# Patient Record
Sex: FEMALE | Race: WHITE | NOT HISPANIC OR LATINO | Employment: FULL TIME
[De-identification: names, ages, dates, MRNs, and addresses within clinical notes are randomized per-mention and may not be internally consistent; named-entity substitution may affect disease eponyms.]

---

## 2017-05-06 ENCOUNTER — RECORDS - HEALTHEAST (OUTPATIENT)
Dept: ADMINISTRATIVE | Facility: OTHER | Age: 52
End: 2017-05-06

## 2017-05-27 ENCOUNTER — RECORDS - HEALTHEAST (OUTPATIENT)
Dept: ADMINISTRATIVE | Facility: OTHER | Age: 52
End: 2017-05-27

## 2017-10-24 ENCOUNTER — OFFICE VISIT - HEALTHEAST (OUTPATIENT)
Dept: FAMILY MEDICINE | Facility: CLINIC | Age: 52
End: 2017-10-24

## 2017-10-24 DIAGNOSIS — Z00.00 ROUTINE GENERAL MEDICAL EXAMINATION AT A HEALTH CARE FACILITY: ICD-10-CM

## 2017-10-24 DIAGNOSIS — E78.5 HYPERLIPIDEMIA, UNSPECIFIED HYPERLIPIDEMIA TYPE: ICD-10-CM

## 2017-10-24 DIAGNOSIS — E66.09 CLASS 1 OBESITY DUE TO EXCESS CALORIES WITH SERIOUS COMORBIDITY AND BODY MASS INDEX (BMI) OF 33.0 TO 33.9 IN ADULT: ICD-10-CM

## 2017-10-24 DIAGNOSIS — E11.9 TYPE 2 DIABETES MELLITUS (H): ICD-10-CM

## 2017-10-24 DIAGNOSIS — E66.811 CLASS 1 OBESITY DUE TO EXCESS CALORIES WITH SERIOUS COMORBIDITY AND BODY MASS INDEX (BMI) OF 33.0 TO 33.9 IN ADULT: ICD-10-CM

## 2017-10-24 DIAGNOSIS — J01.00 ACUTE NON-RECURRENT MAXILLARY SINUSITIS: ICD-10-CM

## 2017-10-24 DIAGNOSIS — R60.9 EDEMA, UNSPECIFIED TYPE: ICD-10-CM

## 2017-10-24 DIAGNOSIS — Z12.11 COLON CANCER SCREENING: ICD-10-CM

## 2017-10-24 LAB — HBA1C MFR BLD: 6.5 % (ref 3.5–6)

## 2017-10-24 RX ORDER — CHLORAL HYDRATE 500 MG
2 CAPSULE ORAL DAILY
Status: SHIPPED | COMMUNITY
Start: 2017-10-24 | End: 2023-12-06

## 2017-10-24 ASSESSMENT — MIFFLIN-ST. JEOR: SCORE: 1418.95

## 2017-10-27 LAB
HPV INTERPRETATION - HISTORICAL: NORMAL
HPV INTERPRETER - HISTORICAL: NORMAL

## 2017-11-02 ENCOUNTER — COMMUNICATION - HEALTHEAST (OUTPATIENT)
Dept: FAMILY MEDICINE | Facility: CLINIC | Age: 52
End: 2017-11-02

## 2017-11-09 ENCOUNTER — HOSPITAL ENCOUNTER (OUTPATIENT)
Dept: MAMMOGRAPHY | Facility: CLINIC | Age: 52
Discharge: HOME | End: 2017-11-09
Attending: NURSE PRACTITIONER

## 2017-11-09 DIAGNOSIS — Z00.00 ROUTINE GENERAL MEDICAL EXAMINATION AT A HEALTH CARE FACILITY: ICD-10-CM

## 2017-11-27 ENCOUNTER — RECORDS - HEALTHEAST (OUTPATIENT)
Dept: ADMINISTRATIVE | Facility: OTHER | Age: 52
End: 2017-11-27

## 2017-11-28 ENCOUNTER — COMMUNICATION - HEALTHEAST (OUTPATIENT)
Dept: FAMILY MEDICINE | Facility: CLINIC | Age: 52
End: 2017-11-28

## 2017-11-28 DIAGNOSIS — E11.9 TYPE 2 DIABETES MELLITUS (H): ICD-10-CM

## 2018-01-10 ENCOUNTER — COMMUNICATION - HEALTHEAST (OUTPATIENT)
Dept: FAMILY MEDICINE | Facility: CLINIC | Age: 53
End: 2018-01-10

## 2018-02-16 ENCOUNTER — COMMUNICATION - HEALTHEAST (OUTPATIENT)
Dept: TELEHEALTH | Facility: CLINIC | Age: 53
End: 2018-02-16

## 2018-02-16 ENCOUNTER — OFFICE VISIT - HEALTHEAST (OUTPATIENT)
Dept: FAMILY MEDICINE | Facility: CLINIC | Age: 53
End: 2018-02-16

## 2018-02-16 DIAGNOSIS — M79.651 PAIN OF RIGHT THIGH: ICD-10-CM

## 2018-02-16 LAB
ANION GAP SERPL CALCULATED.3IONS-SCNC: 12 MMOL/L (ref 5–18)
BUN SERPL-MCNC: 21 MG/DL (ref 8–22)
CALCIUM SERPL-MCNC: 10.5 MG/DL (ref 8.5–10.5)
CHLORIDE BLD-SCNC: 101 MMOL/L (ref 98–107)
CO2 SERPL-SCNC: 26 MMOL/L (ref 22–31)
CREAT SERPL-MCNC: 0.84 MG/DL (ref 0.6–1.1)
D DIMER PPP FEU-MCNC: 0.48 FEU UG/ML
GFR SERPL CREATININE-BSD FRML MDRD: >60 ML/MIN/1.73M2
GLUCOSE BLD-MCNC: 123 MG/DL (ref 70–125)
MAGNESIUM SERPL-MCNC: 1.8 MG/DL (ref 1.8–2.6)
POTASSIUM BLD-SCNC: 4.2 MMOL/L (ref 3.5–5)
SODIUM SERPL-SCNC: 139 MMOL/L (ref 136–145)

## 2018-02-16 ASSESSMENT — MIFFLIN-ST. JEOR: SCORE: 1433.92

## 2018-03-20 ENCOUNTER — COMMUNICATION - HEALTHEAST (OUTPATIENT)
Dept: FAMILY MEDICINE | Facility: CLINIC | Age: 53
End: 2018-03-20

## 2018-04-12 ENCOUNTER — COMMUNICATION - HEALTHEAST (OUTPATIENT)
Dept: FAMILY MEDICINE | Facility: CLINIC | Age: 53
End: 2018-04-12

## 2018-04-12 DIAGNOSIS — E78.5 HYPERLIPIDEMIA, UNSPECIFIED HYPERLIPIDEMIA TYPE: ICD-10-CM

## 2018-04-15 ENCOUNTER — COMMUNICATION - HEALTHEAST (OUTPATIENT)
Dept: FAMILY MEDICINE | Facility: CLINIC | Age: 53
End: 2018-04-15

## 2018-04-16 ENCOUNTER — AMBULATORY - HEALTHEAST (OUTPATIENT)
Dept: FAMILY MEDICINE | Facility: CLINIC | Age: 53
End: 2018-04-16

## 2018-05-25 ENCOUNTER — COMMUNICATION - HEALTHEAST (OUTPATIENT)
Dept: FAMILY MEDICINE | Facility: CLINIC | Age: 53
End: 2018-05-25

## 2018-05-25 DIAGNOSIS — E11.9 TYPE 2 DIABETES MELLITUS (H): ICD-10-CM

## 2018-06-20 ENCOUNTER — COMMUNICATION - HEALTHEAST (OUTPATIENT)
Dept: FAMILY MEDICINE | Facility: CLINIC | Age: 53
End: 2018-06-20

## 2018-06-20 DIAGNOSIS — R60.9 EDEMA, UNSPECIFIED TYPE: ICD-10-CM

## 2018-07-12 ENCOUNTER — COMMUNICATION - HEALTHEAST (OUTPATIENT)
Dept: FAMILY MEDICINE | Facility: CLINIC | Age: 53
End: 2018-07-12

## 2018-07-12 DIAGNOSIS — E78.5 HYPERLIPIDEMIA, UNSPECIFIED HYPERLIPIDEMIA TYPE: ICD-10-CM

## 2018-08-17 ENCOUNTER — COMMUNICATION - HEALTHEAST (OUTPATIENT)
Dept: FAMILY MEDICINE | Facility: CLINIC | Age: 53
End: 2018-08-17

## 2018-08-17 DIAGNOSIS — E11.9 TYPE 2 DIABETES MELLITUS (H): ICD-10-CM

## 2018-08-23 ENCOUNTER — OFFICE VISIT - HEALTHEAST (OUTPATIENT)
Dept: FAMILY MEDICINE | Facility: CLINIC | Age: 53
End: 2018-08-23

## 2018-08-23 DIAGNOSIS — R60.9 EDEMA, UNSPECIFIED TYPE: ICD-10-CM

## 2018-08-23 DIAGNOSIS — E11.9 TYPE 2 DIABETES MELLITUS (H): ICD-10-CM

## 2018-08-23 DIAGNOSIS — E66.01 MORBID OBESITY (H): ICD-10-CM

## 2018-08-23 DIAGNOSIS — J30.81 CHRONIC ALLERGIC RHINITIS DUE TO ANIMAL HAIR AND DANDER: ICD-10-CM

## 2018-08-23 DIAGNOSIS — E78.5 HYPERLIPIDEMIA, UNSPECIFIED HYPERLIPIDEMIA TYPE: ICD-10-CM

## 2018-08-23 DIAGNOSIS — D23.9 DERMATOFIBROMA: ICD-10-CM

## 2018-08-23 DIAGNOSIS — Z79.899 MEDICATION MANAGEMENT: ICD-10-CM

## 2018-08-23 DIAGNOSIS — M76.31 IT BAND SYNDROME, RIGHT: ICD-10-CM

## 2018-08-23 LAB
ALBUMIN SERPL-MCNC: 4.1 G/DL (ref 3.5–5)
ALP SERPL-CCNC: 81 U/L (ref 45–120)
ALT SERPL W P-5'-P-CCNC: 26 U/L (ref 0–45)
ANION GAP SERPL CALCULATED.3IONS-SCNC: 10 MMOL/L (ref 5–18)
AST SERPL W P-5'-P-CCNC: 17 U/L (ref 0–40)
BILIRUB SERPL-MCNC: 0.4 MG/DL (ref 0–1)
BUN SERPL-MCNC: 16 MG/DL (ref 8–22)
CALCIUM SERPL-MCNC: 10.6 MG/DL (ref 8.5–10.5)
CHLORIDE BLD-SCNC: 105 MMOL/L (ref 98–107)
CHOLEST SERPL-MCNC: 216 MG/DL
CO2 SERPL-SCNC: 25 MMOL/L (ref 22–31)
CREAT SERPL-MCNC: 0.77 MG/DL (ref 0.6–1.1)
CREAT UR-MCNC: 79.5 MG/DL
FASTING STATUS PATIENT QL REPORTED: YES
GFR SERPL CREATININE-BSD FRML MDRD: >60 ML/MIN/1.73M2
GLUCOSE BLD-MCNC: 159 MG/DL (ref 70–125)
HDLC SERPL-MCNC: 52 MG/DL
LDLC SERPL CALC-MCNC: 135 MG/DL
MICROALBUMIN UR-MCNC: <0.5 MG/DL (ref 0–1.99)
MICROALBUMIN/CREAT UR: NORMAL MG/G
POTASSIUM BLD-SCNC: 4.4 MMOL/L (ref 3.5–5)
PROT SERPL-MCNC: 7.8 G/DL (ref 6–8)
SODIUM SERPL-SCNC: 140 MMOL/L (ref 136–145)
TRIGL SERPL-MCNC: 143 MG/DL

## 2018-08-23 ASSESSMENT — MIFFLIN-ST. JEOR: SCORE: 1465.22

## 2018-08-24 LAB — HBA1C MFR BLD: 7.8 % (ref 4.2–6.1)

## 2018-08-30 ENCOUNTER — AMBULATORY - HEALTHEAST (OUTPATIENT)
Dept: FAMILY MEDICINE | Facility: CLINIC | Age: 53
End: 2018-08-30

## 2018-08-30 ENCOUNTER — COMMUNICATION - HEALTHEAST (OUTPATIENT)
Dept: FAMILY MEDICINE | Facility: CLINIC | Age: 53
End: 2018-08-30

## 2018-08-30 DIAGNOSIS — E11.9 TYPE 2 DIABETES MELLITUS (H): ICD-10-CM

## 2018-09-06 ENCOUNTER — OFFICE VISIT - HEALTHEAST (OUTPATIENT)
Dept: ALLERGY | Facility: CLINIC | Age: 53
End: 2018-09-06

## 2018-09-06 DIAGNOSIS — J30.9 CHRONIC ALLERGIC RHINITIS, UNSPECIFIED SEASONALITY, UNSPECIFIED TRIGGER: ICD-10-CM

## 2018-09-06 ASSESSMENT — MIFFLIN-ST. JEOR: SCORE: 1439.36

## 2018-11-26 ENCOUNTER — OFFICE VISIT - HEALTHEAST (OUTPATIENT)
Dept: FAMILY MEDICINE | Facility: CLINIC | Age: 53
End: 2018-11-26

## 2018-11-26 DIAGNOSIS — Z12.31 VISIT FOR SCREENING MAMMOGRAM: ICD-10-CM

## 2018-11-26 DIAGNOSIS — E66.01 MORBID OBESITY (H): ICD-10-CM

## 2018-11-26 DIAGNOSIS — E11.9 DIABETES MELLITUS, TYPE 2 (H): ICD-10-CM

## 2018-11-26 DIAGNOSIS — R60.9 EDEMA, UNSPECIFIED TYPE: ICD-10-CM

## 2018-11-26 DIAGNOSIS — E78.5 HYPERLIPIDEMIA, UNSPECIFIED HYPERLIPIDEMIA TYPE: ICD-10-CM

## 2018-11-26 DIAGNOSIS — Z00.00 HEALTH CARE MAINTENANCE: ICD-10-CM

## 2018-11-26 DIAGNOSIS — E83.52 HYPERCALCEMIA: ICD-10-CM

## 2018-11-26 LAB
25(OH)D3 SERPL-MCNC: 50.2 NG/ML (ref 30–80)
ALBUMIN SERPL-MCNC: 3.9 G/DL (ref 3.5–5)
ALP SERPL-CCNC: 67 U/L (ref 45–120)
ALT SERPL W P-5'-P-CCNC: 25 U/L (ref 0–45)
ANION GAP SERPL CALCULATED.3IONS-SCNC: 9 MMOL/L (ref 5–18)
AST SERPL W P-5'-P-CCNC: 16 U/L (ref 0–40)
BILIRUB SERPL-MCNC: 0.4 MG/DL (ref 0–1)
BUN SERPL-MCNC: 16 MG/DL (ref 8–22)
CALCIUM SERPL-MCNC: 10.2 MG/DL (ref 8.5–10.5)
CHLORIDE BLD-SCNC: 104 MMOL/L (ref 98–107)
CHOLEST SERPL-MCNC: 167 MG/DL
CO2 SERPL-SCNC: 26 MMOL/L (ref 22–31)
CREAT SERPL-MCNC: 0.82 MG/DL (ref 0.6–1.1)
FASTING STATUS PATIENT QL REPORTED: YES
GFR SERPL CREATININE-BSD FRML MDRD: >60 ML/MIN/1.73M2
GLUCOSE BLD-MCNC: 178 MG/DL (ref 70–125)
HBA1C MFR BLD: 8 % (ref 3.5–6)
HDLC SERPL-MCNC: 48 MG/DL
LDLC SERPL CALC-MCNC: 88 MG/DL
POTASSIUM BLD-SCNC: 4.2 MMOL/L (ref 3.5–5)
PROT SERPL-MCNC: 7.4 G/DL (ref 6–8)
SODIUM SERPL-SCNC: 139 MMOL/L (ref 136–145)
TRIGL SERPL-MCNC: 155 MG/DL

## 2018-11-26 RX ORDER — LORATADINE 10 MG/1
10 TABLET ORAL DAILY
Status: SHIPPED | COMMUNITY
Start: 2018-11-26

## 2018-11-26 ASSESSMENT — MIFFLIN-ST. JEOR: SCORE: 1453.88

## 2018-11-29 ENCOUNTER — COMMUNICATION - HEALTHEAST (OUTPATIENT)
Dept: FAMILY MEDICINE | Facility: CLINIC | Age: 53
End: 2018-11-29

## 2018-12-17 ENCOUNTER — COMMUNICATION - HEALTHEAST (OUTPATIENT)
Dept: FAMILY MEDICINE | Facility: CLINIC | Age: 53
End: 2018-12-17

## 2018-12-18 ENCOUNTER — AMBULATORY - HEALTHEAST (OUTPATIENT)
Dept: EDUCATION SERVICES | Facility: CLINIC | Age: 53
End: 2018-12-18

## 2018-12-18 DIAGNOSIS — E11.65 TYPE 2 DIABETES MELLITUS WITH HYPERGLYCEMIA, WITHOUT LONG-TERM CURRENT USE OF INSULIN (H): ICD-10-CM

## 2019-02-24 ENCOUNTER — COMMUNICATION - HEALTHEAST (OUTPATIENT)
Dept: FAMILY MEDICINE | Facility: CLINIC | Age: 54
End: 2019-02-24

## 2019-02-24 DIAGNOSIS — E78.5 HYPERLIPIDEMIA, UNSPECIFIED HYPERLIPIDEMIA TYPE: ICD-10-CM

## 2019-02-24 DIAGNOSIS — E11.9 DIABETES MELLITUS, TYPE 2 (H): ICD-10-CM

## 2019-03-11 ENCOUNTER — COMMUNICATION - HEALTHEAST (OUTPATIENT)
Dept: FAMILY MEDICINE | Facility: CLINIC | Age: 54
End: 2019-03-11

## 2019-03-11 DIAGNOSIS — R60.9 EDEMA, UNSPECIFIED TYPE: ICD-10-CM

## 2019-03-11 DIAGNOSIS — E11.65 TYPE 2 DIABETES MELLITUS WITH HYPERGLYCEMIA, WITHOUT LONG-TERM CURRENT USE OF INSULIN (H): ICD-10-CM

## 2019-03-18 ENCOUNTER — OFFICE VISIT - HEALTHEAST (OUTPATIENT)
Dept: FAMILY MEDICINE | Facility: CLINIC | Age: 54
End: 2019-03-18

## 2019-03-18 DIAGNOSIS — Z12.11 SCREEN FOR COLON CANCER: ICD-10-CM

## 2019-03-18 DIAGNOSIS — Z12.31 VISIT FOR SCREENING MAMMOGRAM: ICD-10-CM

## 2019-03-18 DIAGNOSIS — E11.65 TYPE 2 DIABETES MELLITUS WITH HYPERGLYCEMIA, WITHOUT LONG-TERM CURRENT USE OF INSULIN (H): ICD-10-CM

## 2019-03-18 DIAGNOSIS — J30.9 CHRONIC ALLERGIC RHINITIS: ICD-10-CM

## 2019-03-18 DIAGNOSIS — E66.01 MORBID OBESITY (H): ICD-10-CM

## 2019-03-18 DIAGNOSIS — E78.2 MIXED HYPERLIPIDEMIA: ICD-10-CM

## 2019-03-18 DIAGNOSIS — R60.9 EDEMA, UNSPECIFIED TYPE: ICD-10-CM

## 2019-03-18 DIAGNOSIS — R21 RASH: ICD-10-CM

## 2019-03-18 ASSESSMENT — MIFFLIN-ST. JEOR: SCORE: 1428.02

## 2019-03-19 LAB — HBA1C MFR BLD: 6.7 % (ref 4.2–6.1)

## 2019-03-20 ENCOUNTER — COMMUNICATION - HEALTHEAST (OUTPATIENT)
Dept: FAMILY MEDICINE | Facility: CLINIC | Age: 54
End: 2019-03-20

## 2019-03-20 DIAGNOSIS — E11.65 TYPE 2 DIABETES MELLITUS WITH HYPERGLYCEMIA, WITHOUT LONG-TERM CURRENT USE OF INSULIN (H): ICD-10-CM

## 2019-03-20 DIAGNOSIS — E11.9 TYPE 2 DIABETES MELLITUS (H): ICD-10-CM

## 2019-03-21 ENCOUNTER — COMMUNICATION - HEALTHEAST (OUTPATIENT)
Dept: FAMILY MEDICINE | Facility: CLINIC | Age: 54
End: 2019-03-21

## 2019-03-25 ENCOUNTER — COMMUNICATION - HEALTHEAST (OUTPATIENT)
Dept: FAMILY MEDICINE | Facility: CLINIC | Age: 54
End: 2019-03-25

## 2019-03-25 DIAGNOSIS — R60.9 EDEMA, UNSPECIFIED TYPE: ICD-10-CM

## 2019-04-01 ENCOUNTER — HOSPITAL ENCOUNTER (OUTPATIENT)
Dept: MAMMOGRAPHY | Facility: CLINIC | Age: 54
Discharge: HOME | End: 2019-04-01
Attending: NURSE PRACTITIONER

## 2019-04-01 DIAGNOSIS — Z12.31 VISIT FOR SCREENING MAMMOGRAM: ICD-10-CM

## 2019-08-01 ENCOUNTER — OFFICE VISIT - HEALTHEAST (OUTPATIENT)
Dept: FAMILY MEDICINE | Facility: CLINIC | Age: 54
End: 2019-08-01

## 2019-08-01 DIAGNOSIS — E11.65 TYPE 2 DIABETES MELLITUS WITH HYPERGLYCEMIA, WITHOUT LONG-TERM CURRENT USE OF INSULIN (H): ICD-10-CM

## 2019-08-01 DIAGNOSIS — E66.01 MORBID OBESITY (H): ICD-10-CM

## 2019-08-01 DIAGNOSIS — Z79.899 MEDICATION MANAGEMENT: ICD-10-CM

## 2019-08-01 DIAGNOSIS — R60.9 EDEMA, UNSPECIFIED TYPE: ICD-10-CM

## 2019-08-01 DIAGNOSIS — E78.2 MIXED HYPERLIPIDEMIA: ICD-10-CM

## 2019-08-01 LAB
ALBUMIN SERPL-MCNC: 4 G/DL (ref 3.5–5)
ALP SERPL-CCNC: 72 U/L (ref 45–120)
ALT SERPL W P-5'-P-CCNC: 21 U/L (ref 0–45)
ANION GAP SERPL CALCULATED.3IONS-SCNC: 9 MMOL/L (ref 5–18)
AST SERPL W P-5'-P-CCNC: 15 U/L (ref 0–40)
BILIRUB SERPL-MCNC: 0.3 MG/DL (ref 0–1)
BUN SERPL-MCNC: 20 MG/DL (ref 8–22)
CALCIUM SERPL-MCNC: 10.5 MG/DL (ref 8.5–10.5)
CHLORIDE BLD-SCNC: 104 MMOL/L (ref 98–107)
CHOLEST SERPL-MCNC: 194 MG/DL
CO2 SERPL-SCNC: 26 MMOL/L (ref 22–31)
CREAT SERPL-MCNC: 0.76 MG/DL (ref 0.6–1.1)
FASTING STATUS PATIENT QL REPORTED: YES
GFR SERPL CREATININE-BSD FRML MDRD: >60 ML/MIN/1.73M2
GLUCOSE BLD-MCNC: 131 MG/DL (ref 70–125)
HBA1C MFR BLD: 7.2 % (ref 3.5–6)
HDLC SERPL-MCNC: 48 MG/DL
LDLC SERPL CALC-MCNC: 119 MG/DL
POTASSIUM BLD-SCNC: 4.6 MMOL/L (ref 3.5–5)
PROT SERPL-MCNC: 7.4 G/DL (ref 6–8)
SODIUM SERPL-SCNC: 139 MMOL/L (ref 136–145)
TRIGL SERPL-MCNC: 137 MG/DL

## 2019-08-01 ASSESSMENT — MIFFLIN-ST. JEOR: SCORE: 1447.98

## 2019-08-07 ENCOUNTER — COMMUNICATION - HEALTHEAST (OUTPATIENT)
Dept: FAMILY MEDICINE | Facility: CLINIC | Age: 54
End: 2019-08-07

## 2019-09-28 ENCOUNTER — OFFICE VISIT - HEALTHEAST (OUTPATIENT)
Dept: FAMILY MEDICINE | Facility: CLINIC | Age: 54
End: 2019-09-28

## 2019-09-28 DIAGNOSIS — S46.811A STRAIN OF RIGHT TRAPEZIUS MUSCLE, INITIAL ENCOUNTER: ICD-10-CM

## 2019-11-03 ENCOUNTER — RECORDS - HEALTHEAST (OUTPATIENT)
Dept: GENERAL RADIOLOGY | Facility: CLINIC | Age: 54
End: 2019-11-03

## 2019-11-03 ENCOUNTER — OFFICE VISIT - HEALTHEAST (OUTPATIENT)
Dept: FAMILY MEDICINE | Facility: CLINIC | Age: 54
End: 2019-11-03

## 2019-11-03 DIAGNOSIS — R05.9 COUGH: ICD-10-CM

## 2019-11-27 ENCOUNTER — COMMUNICATION - HEALTHEAST (OUTPATIENT)
Dept: FAMILY MEDICINE | Facility: CLINIC | Age: 54
End: 2019-11-27

## 2019-11-27 DIAGNOSIS — R60.9 EDEMA, UNSPECIFIED TYPE: ICD-10-CM

## 2019-11-27 DIAGNOSIS — E78.2 MIXED HYPERLIPIDEMIA: ICD-10-CM

## 2019-11-27 DIAGNOSIS — E11.65 TYPE 2 DIABETES MELLITUS WITH HYPERGLYCEMIA, WITHOUT LONG-TERM CURRENT USE OF INSULIN (H): ICD-10-CM

## 2019-12-08 ENCOUNTER — OFFICE VISIT - HEALTHEAST (OUTPATIENT)
Dept: FAMILY MEDICINE | Facility: CLINIC | Age: 54
End: 2019-12-08

## 2019-12-08 ENCOUNTER — HOSPITAL ENCOUNTER (OUTPATIENT)
Dept: CT IMAGING | Facility: CLINIC | Age: 54
Discharge: HOME | End: 2019-12-08
Attending: FAMILY MEDICINE

## 2019-12-08 DIAGNOSIS — R19.09 UMBILICAL MASS: ICD-10-CM

## 2019-12-08 DIAGNOSIS — R23.4 INDURATION OF SKIN: ICD-10-CM

## 2019-12-08 DIAGNOSIS — K80.20 CALCULUS OF GALLBLADDER WITHOUT CHOLECYSTITIS WITHOUT OBSTRUCTION: ICD-10-CM

## 2019-12-08 DIAGNOSIS — J01.90 ACUTE NON-RECURRENT SINUSITIS, UNSPECIFIED LOCATION: ICD-10-CM

## 2019-12-08 DIAGNOSIS — K43.6 SUPRAUMBILICAL HERNIA WITH OBSTRUCTION: ICD-10-CM

## 2019-12-08 DIAGNOSIS — R10.33 ACUTE PERIUMBILICAL PAIN: ICD-10-CM

## 2019-12-08 LAB
BASOPHILS # BLD AUTO: 0 THOU/UL (ref 0–0.2)
BASOPHILS NFR BLD AUTO: 1 % (ref 0–2)
CREAT BLD-MCNC: 0.6 MG/DL (ref 0.6–1.1)
EOSINOPHIL # BLD AUTO: 0.3 THOU/UL (ref 0–0.4)
EOSINOPHIL NFR BLD AUTO: 4 % (ref 0–6)
ERYTHROCYTE [DISTWIDTH] IN BLOOD BY AUTOMATED COUNT: 11.9 % (ref 11–14.5)
GFR SERPL CREATININE-BSD FRML MDRD: >60 ML/MIN/1.73M2
HCT VFR BLD AUTO: 39.3 % (ref 35–47)
HGB BLD-MCNC: 13.1 G/DL (ref 12–16)
LYMPHOCYTES # BLD AUTO: 1.8 THOU/UL (ref 0.8–4.4)
LYMPHOCYTES NFR BLD AUTO: 22 % (ref 20–40)
MCH RBC QN AUTO: 30.3 PG (ref 27–34)
MCHC RBC AUTO-ENTMCNC: 33.3 G/DL (ref 32–36)
MCV RBC AUTO: 91 FL (ref 80–100)
MONOCYTES # BLD AUTO: 0.5 THOU/UL (ref 0–0.9)
MONOCYTES NFR BLD AUTO: 6 % (ref 2–10)
NEUTROPHILS # BLD AUTO: 5.8 THOU/UL (ref 2–7.7)
NEUTROPHILS NFR BLD AUTO: 68 % (ref 50–70)
PLATELET # BLD AUTO: 348 THOU/UL (ref 140–440)
PMV BLD AUTO: 6.8 FL (ref 7–10)
RBC # BLD AUTO: 4.32 MILL/UL (ref 3.8–5.4)
WBC: 8.5 THOU/UL (ref 4–11)

## 2019-12-10 ENCOUNTER — OFFICE VISIT - HEALTHEAST (OUTPATIENT)
Dept: FAMILY MEDICINE | Facility: CLINIC | Age: 54
End: 2019-12-10

## 2019-12-10 DIAGNOSIS — K43.9 SUPRAUMBILICAL HERNIA: ICD-10-CM

## 2019-12-10 DIAGNOSIS — R05.9 COUGH: ICD-10-CM

## 2019-12-10 DIAGNOSIS — J01.90 ACUTE NON-RECURRENT SINUSITIS, UNSPECIFIED LOCATION: ICD-10-CM

## 2019-12-10 ASSESSMENT — MIFFLIN-ST. JEOR: SCORE: 1465.22

## 2019-12-16 ENCOUNTER — OFFICE VISIT - HEALTHEAST (OUTPATIENT)
Dept: SURGERY | Facility: CLINIC | Age: 54
End: 2019-12-16

## 2019-12-16 DIAGNOSIS — K42.0 INCARCERATED UMBILICAL HERNIA: ICD-10-CM

## 2019-12-24 ENCOUNTER — OFFICE VISIT - HEALTHEAST (OUTPATIENT)
Dept: FAMILY MEDICINE | Facility: CLINIC | Age: 54
End: 2019-12-24

## 2019-12-24 DIAGNOSIS — E78.2 MIXED HYPERLIPIDEMIA: ICD-10-CM

## 2019-12-24 DIAGNOSIS — E66.01 MORBID OBESITY (H): ICD-10-CM

## 2019-12-24 DIAGNOSIS — K42.0 INCARCERATED UMBILICAL HERNIA: ICD-10-CM

## 2019-12-24 DIAGNOSIS — R60.9 EDEMA, UNSPECIFIED TYPE: ICD-10-CM

## 2019-12-24 DIAGNOSIS — Z01.818 ENCOUNTER FOR PREOPERATIVE EXAMINATION FOR GENERAL SURGICAL PROCEDURE: ICD-10-CM

## 2019-12-24 DIAGNOSIS — E11.65 TYPE 2 DIABETES MELLITUS WITH HYPERGLYCEMIA, WITHOUT LONG-TERM CURRENT USE OF INSULIN (H): ICD-10-CM

## 2019-12-24 LAB
HGB BLD-MCNC: 13.3 G/DL (ref 12–16)
POTASSIUM BLD-SCNC: 4.3 MMOL/L (ref 3.5–5)

## 2019-12-24 ASSESSMENT — MIFFLIN-ST. JEOR: SCORE: 1467.94

## 2019-12-27 ENCOUNTER — RECORDS - HEALTHEAST (OUTPATIENT)
Dept: ADMINISTRATIVE | Facility: OTHER | Age: 54
End: 2019-12-27

## 2019-12-27 ENCOUNTER — AMBULATORY - HEALTHEAST (OUTPATIENT)
Dept: SURGERY | Facility: CLINIC | Age: 54
End: 2019-12-27

## 2019-12-27 LAB — RETINOPATHY: NEGATIVE

## 2019-12-30 ENCOUNTER — COMMUNICATION - HEALTHEAST (OUTPATIENT)
Dept: FAMILY MEDICINE | Facility: CLINIC | Age: 54
End: 2019-12-30

## 2019-12-30 DIAGNOSIS — E78.2 MIXED HYPERLIPIDEMIA: ICD-10-CM

## 2020-01-08 ENCOUNTER — AMBULATORY - HEALTHEAST (OUTPATIENT)
Dept: SURGERY | Facility: CLINIC | Age: 55
End: 2020-01-08

## 2020-01-08 ENCOUNTER — RECORDS - HEALTHEAST (OUTPATIENT)
Dept: HEALTH INFORMATION MANAGEMENT | Facility: CLINIC | Age: 55
End: 2020-01-08

## 2020-01-10 ASSESSMENT — MIFFLIN-ST. JEOR: SCORE: 1449.57

## 2020-01-15 ENCOUNTER — ANESTHESIA - HEALTHEAST (OUTPATIENT)
Dept: SURGERY | Facility: AMBULATORY SURGERY CENTER | Age: 55
End: 2020-01-15

## 2020-01-16 ENCOUNTER — SURGERY - HEALTHEAST (OUTPATIENT)
Dept: SURGERY | Facility: AMBULATORY SURGERY CENTER | Age: 55
End: 2020-01-16

## 2020-01-18 ENCOUNTER — COMMUNICATION - HEALTHEAST (OUTPATIENT)
Dept: FAMILY MEDICINE | Facility: CLINIC | Age: 55
End: 2020-01-18

## 2020-01-18 DIAGNOSIS — E11.65 TYPE 2 DIABETES MELLITUS WITH HYPERGLYCEMIA, WITHOUT LONG-TERM CURRENT USE OF INSULIN (H): ICD-10-CM

## 2020-01-18 DIAGNOSIS — R60.9 EDEMA, UNSPECIFIED TYPE: ICD-10-CM

## 2020-01-30 ENCOUNTER — OFFICE VISIT - HEALTHEAST (OUTPATIENT)
Dept: SURGERY | Facility: CLINIC | Age: 55
End: 2020-01-30

## 2020-01-30 DIAGNOSIS — Z09 S/P UMBILICAL HERNIA REPAIR, FOLLOW-UP EXAM: ICD-10-CM

## 2020-01-30 ASSESSMENT — MIFFLIN-ST. JEOR: SCORE: 1449.57

## 2020-02-25 ENCOUNTER — OFFICE VISIT - HEALTHEAST (OUTPATIENT)
Dept: FAMILY MEDICINE | Facility: CLINIC | Age: 55
End: 2020-02-25

## 2020-02-25 DIAGNOSIS — J20.9 ACUTE BRONCHITIS, UNSPECIFIED ORGANISM: ICD-10-CM

## 2020-02-25 LAB
DEPRECATED S PYO AG THROAT QL EIA: NORMAL
FLUAV AG SPEC QL IA: NORMAL
FLUBV AG SPEC QL IA: NORMAL

## 2020-02-26 LAB — GROUP A STREP BY PCR: NORMAL

## 2020-03-19 ENCOUNTER — COMMUNICATION - HEALTHEAST (OUTPATIENT)
Dept: FAMILY MEDICINE | Facility: CLINIC | Age: 55
End: 2020-03-19

## 2020-03-19 DIAGNOSIS — E11.65 TYPE 2 DIABETES MELLITUS WITH HYPERGLYCEMIA, WITHOUT LONG-TERM CURRENT USE OF INSULIN (H): ICD-10-CM

## 2020-04-20 ENCOUNTER — OFFICE VISIT - HEALTHEAST (OUTPATIENT)
Dept: FAMILY MEDICINE | Facility: CLINIC | Age: 55
End: 2020-04-20

## 2020-04-20 DIAGNOSIS — R60.9 EDEMA, UNSPECIFIED TYPE: ICD-10-CM

## 2020-04-20 DIAGNOSIS — E78.2 MIXED HYPERLIPIDEMIA: ICD-10-CM

## 2020-04-20 DIAGNOSIS — E11.65 TYPE 2 DIABETES MELLITUS WITH HYPERGLYCEMIA, WITHOUT LONG-TERM CURRENT USE OF INSULIN (H): ICD-10-CM

## 2020-04-20 DIAGNOSIS — E66.01 MORBID OBESITY (H): ICD-10-CM

## 2020-04-22 ENCOUNTER — COMMUNICATION - HEALTHEAST (OUTPATIENT)
Dept: FAMILY MEDICINE | Facility: CLINIC | Age: 55
End: 2020-04-22

## 2020-04-22 DIAGNOSIS — E11.65 TYPE 2 DIABETES MELLITUS WITH HYPERGLYCEMIA, WITHOUT LONG-TERM CURRENT USE OF INSULIN (H): ICD-10-CM

## 2020-05-02 ENCOUNTER — COMMUNICATION - HEALTHEAST (OUTPATIENT)
Dept: FAMILY MEDICINE | Facility: CLINIC | Age: 55
End: 2020-05-02

## 2020-05-02 DIAGNOSIS — E78.2 MIXED HYPERLIPIDEMIA: ICD-10-CM

## 2020-05-04 RX ORDER — PRAVASTATIN SODIUM 80 MG/1
80 TABLET ORAL EVERY EVENING
Qty: 90 TABLET | Refills: 2 | Status: SHIPPED | OUTPATIENT
Start: 2020-05-04 | End: 2022-02-04

## 2020-07-12 ENCOUNTER — COMMUNICATION - HEALTHEAST (OUTPATIENT)
Dept: FAMILY MEDICINE | Facility: CLINIC | Age: 55
End: 2020-07-12

## 2020-07-12 DIAGNOSIS — E11.65 TYPE 2 DIABETES MELLITUS WITH HYPERGLYCEMIA, WITHOUT LONG-TERM CURRENT USE OF INSULIN (H): ICD-10-CM

## 2020-07-12 DIAGNOSIS — E78.2 MIXED HYPERLIPIDEMIA: ICD-10-CM

## 2020-09-28 ENCOUNTER — COMMUNICATION - HEALTHEAST (OUTPATIENT)
Dept: FAMILY MEDICINE | Facility: CLINIC | Age: 55
End: 2020-09-28

## 2020-09-28 DIAGNOSIS — E11.65 TYPE 2 DIABETES MELLITUS WITH HYPERGLYCEMIA, WITHOUT LONG-TERM CURRENT USE OF INSULIN (H): ICD-10-CM

## 2020-10-02 ENCOUNTER — COMMUNICATION - HEALTHEAST (OUTPATIENT)
Dept: FAMILY MEDICINE | Facility: CLINIC | Age: 55
End: 2020-10-02

## 2020-10-05 ENCOUNTER — COMMUNICATION - HEALTHEAST (OUTPATIENT)
Dept: FAMILY MEDICINE | Facility: CLINIC | Age: 55
End: 2020-10-05

## 2020-10-05 DIAGNOSIS — E78.2 MIXED HYPERLIPIDEMIA: ICD-10-CM

## 2020-10-05 DIAGNOSIS — E11.65 TYPE 2 DIABETES MELLITUS WITH HYPERGLYCEMIA, WITHOUT LONG-TERM CURRENT USE OF INSULIN (H): ICD-10-CM

## 2020-10-26 ENCOUNTER — OFFICE VISIT - HEALTHEAST (OUTPATIENT)
Dept: FAMILY MEDICINE | Facility: CLINIC | Age: 55
End: 2020-10-26

## 2020-10-26 ENCOUNTER — COMMUNICATION - HEALTHEAST (OUTPATIENT)
Dept: FAMILY MEDICINE | Facility: CLINIC | Age: 55
End: 2020-10-26

## 2020-10-26 DIAGNOSIS — E78.5 HYPERLIPIDEMIA, UNSPECIFIED HYPERLIPIDEMIA TYPE: ICD-10-CM

## 2020-10-26 DIAGNOSIS — R60.9 EDEMA, UNSPECIFIED TYPE: ICD-10-CM

## 2020-10-26 DIAGNOSIS — E66.01 MORBID OBESITY (H): ICD-10-CM

## 2020-10-26 DIAGNOSIS — Z12.11 COLON CANCER SCREENING: ICD-10-CM

## 2020-10-26 DIAGNOSIS — Z12.31 VISIT FOR SCREENING MAMMOGRAM: ICD-10-CM

## 2020-10-26 DIAGNOSIS — E11.65 TYPE 2 DIABETES MELLITUS WITH HYPERGLYCEMIA, WITHOUT LONG-TERM CURRENT USE OF INSULIN (H): ICD-10-CM

## 2020-10-26 DIAGNOSIS — R53.83 FATIGUE, UNSPECIFIED TYPE: ICD-10-CM

## 2020-10-26 LAB
ALBUMIN SERPL-MCNC: 4.1 G/DL (ref 3.5–5)
ALP SERPL-CCNC: 78 U/L (ref 45–120)
ALT SERPL W P-5'-P-CCNC: 25 U/L (ref 0–45)
ANION GAP SERPL CALCULATED.3IONS-SCNC: 12 MMOL/L (ref 5–18)
AST SERPL W P-5'-P-CCNC: 16 U/L (ref 0–40)
BILIRUB SERPL-MCNC: 0.3 MG/DL (ref 0–1)
BUN SERPL-MCNC: 15 MG/DL (ref 8–22)
CALCIUM SERPL-MCNC: 9.9 MG/DL (ref 8.5–10.5)
CHLORIDE BLD-SCNC: 105 MMOL/L (ref 98–107)
CHOLEST SERPL-MCNC: 180 MG/DL
CO2 SERPL-SCNC: 22 MMOL/L (ref 22–31)
CREAT SERPL-MCNC: 0.8 MG/DL (ref 0.6–1.1)
CREAT UR-MCNC: 283.8 MG/DL
ERYTHROCYTE [DISTWIDTH] IN BLOOD BY AUTOMATED COUNT: 12.6 % (ref 11–14.5)
ERYTHROCYTE [SEDIMENTATION RATE] IN BLOOD BY WESTERGREN METHOD: 19 MM/HR (ref 0–20)
FASTING STATUS PATIENT QL REPORTED: YES
GFR SERPL CREATININE-BSD FRML MDRD: >60 ML/MIN/1.73M2
GLUCOSE BLD-MCNC: 258 MG/DL (ref 70–125)
HBA1C MFR BLD: 9.7 %
HCT VFR BLD AUTO: 39.7 % (ref 35–47)
HDLC SERPL-MCNC: 41 MG/DL
HGB BLD-MCNC: 13.3 G/DL (ref 12–16)
LDLC SERPL CALC-MCNC: 106 MG/DL
MCH RBC QN AUTO: 29.8 PG (ref 27–34)
MCHC RBC AUTO-ENTMCNC: 33.5 G/DL (ref 32–36)
MCV RBC AUTO: 89 FL (ref 80–100)
MICROALBUMIN UR-MCNC: 1.96 MG/DL (ref 0–1.99)
MICROALBUMIN/CREAT UR: 6.9 MG/G
PLATELET # BLD AUTO: 347 THOU/UL (ref 140–440)
PMV BLD AUTO: 10.1 FL (ref 8.5–12.5)
POTASSIUM BLD-SCNC: 4.3 MMOL/L (ref 3.5–5)
PROT SERPL-MCNC: 7.3 G/DL (ref 6–8)
RBC # BLD AUTO: 4.47 MILL/UL (ref 3.8–5.4)
SODIUM SERPL-SCNC: 139 MMOL/L (ref 136–145)
TRIGL SERPL-MCNC: 163 MG/DL
TSH SERPL DL<=0.005 MIU/L-ACNC: 1.28 UIU/ML (ref 0.3–5)
VIT B12 SERPL-MCNC: 487 PG/ML (ref 213–816)
WBC: 5.3 THOU/UL (ref 4–11)

## 2020-10-26 ASSESSMENT — PATIENT HEALTH QUESTIONNAIRE - PHQ9: SUM OF ALL RESPONSES TO PHQ QUESTIONS 1-9: 10

## 2020-10-27 ENCOUNTER — COMMUNICATION - HEALTHEAST (OUTPATIENT)
Dept: FAMILY MEDICINE | Facility: CLINIC | Age: 55
End: 2020-10-27

## 2020-10-27 LAB — 25(OH)D3 SERPL-MCNC: 45.3 NG/ML (ref 30–80)

## 2020-10-28 ENCOUNTER — COMMUNICATION - HEALTHEAST (OUTPATIENT)
Dept: FAMILY MEDICINE | Facility: CLINIC | Age: 55
End: 2020-10-28

## 2020-10-28 ENCOUNTER — AMBULATORY - HEALTHEAST (OUTPATIENT)
Dept: FAMILY MEDICINE | Facility: CLINIC | Age: 55
End: 2020-10-28

## 2020-10-28 DIAGNOSIS — E11.65 TYPE 2 DIABETES MELLITUS WITH HYPERGLYCEMIA, WITHOUT LONG-TERM CURRENT USE OF INSULIN (H): ICD-10-CM

## 2020-10-28 RX ORDER — GLUCOSAMINE HCL/CHONDROITIN SU 500-400 MG
1 CAPSULE ORAL DAILY
Qty: 100 STRIP | Refills: 3 | Status: SHIPPED | OUTPATIENT
Start: 2020-10-28 | End: 2021-10-11

## 2020-10-29 ENCOUNTER — COMMUNICATION - HEALTHEAST (OUTPATIENT)
Dept: FAMILY MEDICINE | Facility: CLINIC | Age: 55
End: 2020-10-29

## 2020-11-05 ENCOUNTER — OFFICE VISIT - HEALTHEAST (OUTPATIENT)
Dept: PHARMACY | Facility: CLINIC | Age: 55
End: 2020-11-05

## 2020-11-05 DIAGNOSIS — E78.5 HYPERLIPIDEMIA, UNSPECIFIED HYPERLIPIDEMIA TYPE: ICD-10-CM

## 2020-11-05 DIAGNOSIS — E11.65 TYPE 2 DIABETES MELLITUS WITH HYPERGLYCEMIA, WITHOUT LONG-TERM CURRENT USE OF INSULIN (H): ICD-10-CM

## 2020-11-05 DIAGNOSIS — R60.9 EDEMA, UNSPECIFIED TYPE: ICD-10-CM

## 2020-11-05 DIAGNOSIS — J30.81 ALLERGIC RHINITIS DUE TO ANIMALS: ICD-10-CM

## 2020-11-05 DIAGNOSIS — Z71.89 ENCOUNTER FOR HERB AND VITAMIN SUPPLEMENT MANAGEMENT: ICD-10-CM

## 2020-11-05 PROCEDURE — 99605 MTMS BY PHARM NP 15 MIN: CPT | Performed by: PHARMACIST

## 2020-11-05 PROCEDURE — 99607 MTMS BY PHARM ADDL 15 MIN: CPT | Performed by: PHARMACIST

## 2020-11-18 ENCOUNTER — COMMUNICATION - HEALTHEAST (OUTPATIENT)
Dept: NURSING | Facility: CLINIC | Age: 55
End: 2020-11-18

## 2020-12-14 ENCOUNTER — COMMUNICATION - HEALTHEAST (OUTPATIENT)
Dept: FAMILY MEDICINE | Facility: CLINIC | Age: 55
End: 2020-12-14

## 2020-12-14 DIAGNOSIS — E11.65 TYPE 2 DIABETES MELLITUS WITH HYPERGLYCEMIA, WITHOUT LONG-TERM CURRENT USE OF INSULIN (H): ICD-10-CM

## 2020-12-21 ENCOUNTER — RECORDS - HEALTHEAST (OUTPATIENT)
Dept: ADMINISTRATIVE | Facility: OTHER | Age: 55
End: 2020-12-21

## 2020-12-21 ENCOUNTER — COMMUNICATION - HEALTHEAST (OUTPATIENT)
Dept: FAMILY MEDICINE | Facility: CLINIC | Age: 55
End: 2020-12-21

## 2020-12-21 DIAGNOSIS — E11.65 TYPE 2 DIABETES MELLITUS WITH HYPERGLYCEMIA, WITHOUT LONG-TERM CURRENT USE OF INSULIN (H): ICD-10-CM

## 2021-01-05 ENCOUNTER — COMMUNICATION - HEALTHEAST (OUTPATIENT)
Dept: FAMILY MEDICINE | Facility: CLINIC | Age: 56
End: 2021-01-05

## 2021-01-05 DIAGNOSIS — E11.65 TYPE 2 DIABETES MELLITUS WITH HYPERGLYCEMIA, WITHOUT LONG-TERM CURRENT USE OF INSULIN (H): ICD-10-CM

## 2021-01-05 DIAGNOSIS — E78.2 MIXED HYPERLIPIDEMIA: ICD-10-CM

## 2021-01-05 RX ORDER — FENOFIBRATE 145 MG/1
TABLET, COATED ORAL
Qty: 90 TABLET | Refills: 3 | Status: SHIPPED | OUTPATIENT
Start: 2021-01-05 | End: 2022-01-26

## 2021-01-29 ENCOUNTER — COMMUNICATION - HEALTHEAST (OUTPATIENT)
Dept: FAMILY MEDICINE | Facility: CLINIC | Age: 56
End: 2021-01-29

## 2021-01-29 DIAGNOSIS — R60.9 EDEMA, UNSPECIFIED TYPE: ICD-10-CM

## 2021-01-31 RX ORDER — HYDROCHLOROTHIAZIDE 25 MG/1
TABLET ORAL
Qty: 90 TABLET | Refills: 2 | Status: SHIPPED | OUTPATIENT
Start: 2021-01-31 | End: 2021-11-26

## 2021-02-01 ENCOUNTER — COMMUNICATION - HEALTHEAST (OUTPATIENT)
Dept: NURSING | Facility: CLINIC | Age: 56
End: 2021-02-01

## 2021-02-01 DIAGNOSIS — E11.65 TYPE 2 DIABETES MELLITUS WITH HYPERGLYCEMIA, WITHOUT LONG-TERM CURRENT USE OF INSULIN (H): ICD-10-CM

## 2021-02-05 ENCOUNTER — COMMUNICATION - HEALTHEAST (OUTPATIENT)
Dept: NURSING | Facility: CLINIC | Age: 56
End: 2021-02-05

## 2021-02-12 ENCOUNTER — AMBULATORY - HEALTHEAST (OUTPATIENT)
Dept: LAB | Facility: CLINIC | Age: 56
End: 2021-02-12

## 2021-02-12 DIAGNOSIS — E11.65 TYPE 2 DIABETES MELLITUS WITH HYPERGLYCEMIA, WITHOUT LONG-TERM CURRENT USE OF INSULIN (H): ICD-10-CM

## 2021-02-12 LAB — HBA1C MFR BLD: 8.4 %

## 2021-02-25 ENCOUNTER — COMMUNICATION - HEALTHEAST (OUTPATIENT)
Dept: PHARMACY | Facility: CLINIC | Age: 56
End: 2021-02-25

## 2021-03-27 ENCOUNTER — AMBULATORY - HEALTHEAST (OUTPATIENT)
Dept: NURSING | Facility: CLINIC | Age: 56
End: 2021-03-27

## 2021-04-17 ENCOUNTER — AMBULATORY - HEALTHEAST (OUTPATIENT)
Dept: NURSING | Facility: CLINIC | Age: 56
End: 2021-04-17

## 2021-04-21 ENCOUNTER — COMMUNICATION - HEALTHEAST (OUTPATIENT)
Dept: FAMILY MEDICINE | Facility: CLINIC | Age: 56
End: 2021-04-21

## 2021-04-21 DIAGNOSIS — E11.65 TYPE 2 DIABETES MELLITUS WITH HYPERGLYCEMIA, WITHOUT LONG-TERM CURRENT USE OF INSULIN (H): ICD-10-CM

## 2021-05-04 ENCOUNTER — RECORDS - HEALTHEAST (OUTPATIENT)
Dept: FAMILY MEDICINE | Facility: CLINIC | Age: 56
End: 2021-05-04

## 2021-05-07 ENCOUNTER — OFFICE VISIT - HEALTHEAST (OUTPATIENT)
Dept: FAMILY MEDICINE | Facility: CLINIC | Age: 56
End: 2021-05-07

## 2021-05-07 DIAGNOSIS — R60.9 EDEMA, UNSPECIFIED TYPE: ICD-10-CM

## 2021-05-07 DIAGNOSIS — E11.65 TYPE 2 DIABETES MELLITUS WITH HYPERGLYCEMIA, WITHOUT LONG-TERM CURRENT USE OF INSULIN (H): ICD-10-CM

## 2021-05-07 DIAGNOSIS — E66.01 MORBID OBESITY (H): ICD-10-CM

## 2021-05-07 DIAGNOSIS — E78.5 HYPERLIPIDEMIA, UNSPECIFIED HYPERLIPIDEMIA TYPE: ICD-10-CM

## 2021-05-07 DIAGNOSIS — J30.9 ALLERGIC RHINITIS, UNSPECIFIED SEASONALITY, UNSPECIFIED TRIGGER: ICD-10-CM

## 2021-05-07 LAB — HBA1C MFR BLD: 8.2 %

## 2021-05-07 ASSESSMENT — PATIENT HEALTH QUESTIONNAIRE - PHQ9: SUM OF ALL RESPONSES TO PHQ QUESTIONS 1-9: 2

## 2021-05-17 ENCOUNTER — AMBULATORY - HEALTHEAST (OUTPATIENT)
Dept: FAMILY MEDICINE | Facility: CLINIC | Age: 56
End: 2021-05-17

## 2021-05-17 ENCOUNTER — COMMUNICATION - HEALTHEAST (OUTPATIENT)
Dept: FAMILY MEDICINE | Facility: CLINIC | Age: 56
End: 2021-05-17

## 2021-05-17 DIAGNOSIS — E11.65 TYPE 2 DIABETES MELLITUS WITH HYPERGLYCEMIA, WITHOUT LONG-TERM CURRENT USE OF INSULIN (H): ICD-10-CM

## 2021-05-19 ENCOUNTER — COMMUNICATION - HEALTHEAST (OUTPATIENT)
Dept: FAMILY MEDICINE | Facility: CLINIC | Age: 56
End: 2021-05-19

## 2021-05-26 ASSESSMENT — PATIENT HEALTH QUESTIONNAIRE - PHQ9: SUM OF ALL RESPONSES TO PHQ QUESTIONS 1-9: 10

## 2021-05-27 VITALS — DIASTOLIC BLOOD PRESSURE: 60 MMHG | HEART RATE: 80 BPM | SYSTOLIC BLOOD PRESSURE: 120 MMHG | WEIGHT: 193.3 LBS

## 2021-05-27 ASSESSMENT — PATIENT HEALTH QUESTIONNAIRE - PHQ9: SUM OF ALL RESPONSES TO PHQ QUESTIONS 1-9: 2

## 2021-05-31 VITALS — WEIGHT: 188 LBS | BODY MASS INDEX: 33.31 KG/M2 | HEIGHT: 63 IN

## 2021-05-31 NOTE — PROGRESS NOTES
Assessment and Plan:   1. Type 2 diabetes mellitus with hyperglycemia, without long-term current use of insulin (H)  We will check A1c notify patient results.  I did increase the metformin 1000 mg twice daily.  She is due for diabetes check in 3 months.  I encouraged her to obtain her yearly eye exam.  She declines pneumococcal vaccine.  I encouraged her to see if her insurance will cover shingles vaccine.  - blood glucose test strips; Use 1 each As Directed daily. Dispense brand per patient's insurance at pharmacy discretion. She has one touch ultra at home  Dispense: 100 strip; Refill: 3  - Glycosylated Hemoglobin A1c  - lancets (ONETOUCH DELICA LANCETS) 33 gauge Misc; Check blood sugars once daily.  Dispense: 100 each; Refill: 3  - dulaglutide (TRULICITY) 0.75 mg/0.5 mL PnIj; Inject 0.75 mg under the skin every 7 days.  Dispense: 12 Syringe; Refill: 1  - metFORMIN (GLUCOPHAGE) 500 MG tablet; Take 2 tablets (1,000 mg total) by mouth 2 (two) times a day with meals.  Dispense: 360 tablet; Refill: 1    2. Mixed hyperlipidemia  She continues on pravastatin and fenofibrate as prescribed.  - Lipid Cascade    3. Edema, unspecified type  This is controlled with hydrochlorothiazide.    4. Morbid obesity (H)  The following high BMI interventions were performed this visit: encouragement to exercise and lifestyle education regarding diet    5. Medication management  - Comprehensive Metabolic Panel    The patient is content with the plan and will follow-up in 3 months for medication management or sooner with any further concerns.       Subjective:     Josefina is a 53 y.o. female presenting to the clinic for her diabetes check.  Patient has been using Trulicity 0.75 mg once weekly.  She is taking metformin and told me at her last visit she was taking 500 mg twice daily.  Patient states she checked her prescription bottle at home and she was taking 1000 mg twice daily, so she has increased the dose over the past month.  She is  consuming a healthy diet.  She walks for exercise.  She has not been checking her blood sugars.  Her last hemoglobin A1c was 6.7% on 3/18/2019.  She denies any sores on her feet.  She is due for an eye exam.  She is taking hydrochlorothiazide for lower extremity edema.  This is well controlled.  She takes pravastatin 80 mg daily and fenofibrate 145 mg daily for her.  Last cholesterol check was on 11/26/2018 with a total cholesterol 167, triglycerides 155, LDL 88, HDL 48.  She denies chest pain, shortness of breath with exertion, edema, orthopnea, syncope.    Review of Systems: A complete 14 point review of systems was obtained and is negative or as stated in the history of present illness.    Social History     Socioeconomic History     Marital status:      Spouse name: Not on file     Number of children: Not on file     Years of education: Not on file     Highest education level: Not on file   Occupational History     Not on file   Social Needs     Financial resource strain: Not on file     Food insecurity:     Worry: Not on file     Inability: Not on file     Transportation needs:     Medical: Not on file     Non-medical: Not on file   Tobacco Use     Smoking status: Never Smoker     Smokeless tobacco: Never Used   Substance and Sexual Activity     Alcohol use: Yes     Alcohol/week: 0.6 oz     Types: 1 Glasses of wine per week     Drug use: No     Sexual activity: Yes     Partners: Male     Comment:    Lifestyle     Physical activity:     Days per week: Not on file     Minutes per session: Not on file     Stress: Not on file   Relationships     Social connections:     Talks on phone: Not on file     Gets together: Not on file     Attends [employer] service: Not on file     Active member of club or organization: Not on file     Attends meetings of clubs or organizations: Not on file     Relationship status: Not on file     Intimate partner violence:     Fear of current or ex partner: Not on file      "Emotionally abused: Not on file     Physically abused: Not on file     Forced sexual activity: Not on file   Other Topics Concern     Not on file   Social History Narrative     Not on file       Active Ambulatory Problems     Diagnosis Date Noted     Type 2 diabetes mellitus (H) 10/24/2017     Hyperlipidemia, unspecified hyperlipidemia type 10/24/2017     Edema, unspecified type 10/24/2017     Class 1 obesity due to excess calories with serious comorbidity and body mass index (BMI) of 33.0 to 33.9 in adult 10/24/2017     Morbid obesity (H) 08/23/2018     Resolved Ambulatory Problems     Diagnosis Date Noted     No Resolved Ambulatory Problems     Past Medical History:   Diagnosis Date     Diabetes mellitus (H)      Environmental allergies      Hyperlipidemia        Family History   Problem Relation Age of Onset     Lung cancer Mother      Diabetes Mother      Heart disease Father      COPD Father      Dementia Father      Parkinsonism Father      Hyperlipidemia Father      Diabetes Maternal Aunt      Diabetes Maternal Grandmother      Diabetes Paternal Grandfather        Objective:     /80   Pulse 75   Ht 5' 1.5\" (1.562 m)   Wt 197 lb 14.4 oz (89.8 kg)   SpO2 98%   BMI 36.79 kg/m      Patient is alert, in no obvious distress.   Skin: Warm, dry.  No lesions or rashes.  Skin turgor rapid return.   HEENT:  Head normocephalic, atraumatic.  Eyes normal. Ears normal.  Nose patent, mucosa pink.  Oropharynx mucosa pink.  No lesions or tonsillar enlargement.   Neck: Supple, no lymphadenopathy.   Lungs:  Clear to auscultation. Respirations even and unlabored.  No wheezing or rales noted.   Heart:  Regular rate and rhythm.  No murmurs, S3, S4, gallops, or rubs.    Abdomen: Soft, nontender.  No organomegaly. Bowel sounds normoactive. No guarding or masses noted.   Musculoskeletal: No edema present in bilateral lower extremities.  Monofilament testing is normal bilaterally.               "

## 2021-06-01 VITALS — BODY MASS INDEX: 33.89 KG/M2 | HEIGHT: 63 IN | WEIGHT: 191.3 LBS

## 2021-06-01 VITALS — WEIGHT: 196 LBS | BODY MASS INDEX: 36.07 KG/M2 | HEIGHT: 62 IN

## 2021-06-01 VITALS — BODY MASS INDEX: 35.12 KG/M2 | HEIGHT: 63 IN | WEIGHT: 198.2 LBS

## 2021-06-01 NOTE — PROGRESS NOTES
Chief Complaint   Patient presents with     right shoulde pain x 4 days gradually worsening       ASSESSMENT & PLAN:   Diagnoses and all orders for this visit:    Strain of right trapezius muscle, initial encounter  -     Trigger Point injection, > 3        MDM:  Trigger point injection done to right upper lateral trapezius muscle trigger point identified.  History and exam were consistent with trapezius muscle spasm radiating into shoulder.  Palpation of trigger points reproduces pain that shoots down the shoulder.  No tenderness of her shoulder itself no history that would be consistent with an acute shoulder injury.    Patient reports being approximately 50% better with pain following trigger point injections.  Does have increased range of motion, able to lift arm higher.    Recommended checking desk for ergonomics so her shoulders are not shrugging at work at her desk. After discussion, pt prefers to try her chiropractor and follow-up if not effective.    Supportive care discussed.  See discharge instructions below for specific recommendations given.    At the end of the encounter, I discussed results, diagnosis, medications. Discussed red flags for immediate return to clinic/ER, as well as indications for follow up if no improvement. Patient and/or caregiver understood and agreed to plan. Patient was stable for discharge.    SUBJECTIVE    HPI:  HPI  Josefina Damico presents to the walk-in clinic with right shoulder pain, gradually worsening over the last 4 days.  Woke up with shoulder pain.  Associated with cough/cold symptoms 2 days before, feeling better now.  No recent activities that would account for sx.      Patient has a history of type 2 diabetes.  Has never had surgery on the shoulder nor other types of surgeries significant injury to the shoulder.     Has tried soaking in hot bath, ibuprofen and keeping arm in a neutral position  Helped a bit.     Works in a [employer] center.      Pain is 1-2 without  activity or 7/10 with certain movements such as reaching out.      History obtained from the patient.    Past Medical History:   Diagnosis Date     Diabetes mellitus (H)      Environmental allergies      Hyperlipidemia        Active Ambulatory (Non-Hospital) Problems    Diagnosis     Morbid obesity (H)     Type 2 diabetes mellitus (H)     Hyperlipidemia, unspecified hyperlipidemia type     Edema, unspecified type     Class 1 obesity due to excess calories with serious comorbidity and body mass index (BMI) of 33.0 to 33.9 in adult       Family History   Problem Relation Age of Onset     Lung cancer Mother      Diabetes Mother      Heart disease Father      COPD Father      Dementia Father      Parkinsonism Father      Hyperlipidemia Father      Diabetes Maternal Aunt      Diabetes Maternal Grandmother      Diabetes Paternal Grandfather        Social History     Tobacco Use     Smoking status: Never Smoker     Smokeless tobacco: Never Used   Substance Use Topics     Alcohol use: Yes     Alcohol/week: 1.0 standard drinks     Types: 1 Glasses of wine per week       Review of Systems   Constitutional: Negative for chills and fever.   HENT: Negative for congestion.    Respiratory: Negative for cough.    Musculoskeletal: Negative for myalgias, neck pain and neck stiffness.       OBJECTIVE    Vitals:    09/28/19 1059   BP: 112/66   Patient Site: Left Arm   Patient Position: Sitting   Cuff Size: Adult Regular   Pulse: 74   Resp: 20   Temp: 98.3  F (36.8  C)   TempSrc: Oral   SpO2: 97%   Weight: 200 lb 11.2 oz (91 kg)       Physical Exam  Constitutional:       General: She is not in acute distress.     Appearance: She is well-developed.   HENT:      Right Ear: External ear normal.      Left Ear: External ear normal.   Eyes:      General:         Right eye: No discharge.         Left eye: No discharge.      Conjunctiva/sclera: Conjunctivae normal.   Pulmonary:      Effort: Pulmonary effort is normal.   Musculoskeletal: Normal  range of motion.         General: Tenderness present. No deformity.      Comments: Reaching forward with right arm triggers pain in right trapezius radiating down past the right shoulder.  Unable to lift arm past 90 degrees due to pain.   Skin:     General: Skin is warm and dry.      Capillary Refill: Capillary refill takes less than 2 seconds.      Findings: No erythema.   Neurological:      General: No focal deficit present.      Mental Status: She is alert and oriented to person, place, and time.      Motor: No weakness.      Comments: Normal  strength on right.   Psychiatric:         Behavior: Behavior normal.         Thought Content: Thought content normal.         Judgement: Judgment normal.         Labs:  No results found for this or any previous visit (from the past 240 hour(s)).      Radiology:    No results found.    PATIENT INSTRUCTIONS:   Patient Instructions   Apply ice to area that I injected 20 minutes at a time 4-6 times per day for the next 2 days.  After that, I would try warm packs.    Ibuprofen 600 mg 4 times daily for discomfort.    Move your arm intermittently to the point at which pain occurs.    You may roll on a tennis ball on the right side to help release the muscle spasm if you can tolerate it.    Visit your chiropractor early next week for some soft tissue work to your trapezius muscle/active release/Graston.    If you are not considerably better in 1 week with very limited range of motion, please recheck at your clinic.

## 2021-06-01 NOTE — PATIENT INSTRUCTIONS - HE
Apply ice to area that I injected 20 minutes at a time 4-6 times per day for the next 2 days.  After that, I would try warm packs.    Ibuprofen 600 mg 4 times daily for discomfort.    Move your arm intermittently to the point at which pain occurs.    You may roll on a tennis ball on the right side to help release the muscle spasm if you can tolerate it.    Visit your chiropractor early next week for some soft tissue work to your trapezius muscle/active release/Graston.    If you are not considerably better in 1 week with very limited range of motion, please recheck at your clinic.

## 2021-06-01 NOTE — PROGRESS NOTES
"Trigger Point injection, > 3  Date/Time: 9/28/2019 11:51 AM  Performed by: Gaby Francisco CNP  Authorized by: Gaby Francisco CNP   Consent: Verbal consent obtained.  Risks and benefits: risks, benefits and alternatives were discussed  Consent given by: patient  Patient understanding: patient states understanding of the procedure being performed  Patient consent: the patient's understanding of the procedure matches consent given  Procedure consent: procedure consent matches procedure scheduled  Relevant documents: relevant documents present and verified  Test results: test results available and properly labeled  Site marked: the operative site was marked  Imaging studies: imaging studies not available  Patient identity confirmed: verbally with patient  Time out: Immediately prior to procedure a \"time out\" was called to verify the correct patient, procedure, equipment, support staff and site/side marked as required.  Local anesthesia used: yes    Anesthesia:  Local anesthesia used: yes    Sedation:  Patient sedated: no    Patient tolerance: Patient tolerated the procedure well with no immediate complications  Comments: 1.5 cc of 1% lidocaine injected into right upper lateral trapezius muscle trigger point identified.          "

## 2021-06-02 VITALS — BODY MASS INDEX: 36.86 KG/M2 | WEIGHT: 198.3 LBS

## 2021-06-02 VITALS — BODY MASS INDEX: 35.61 KG/M2 | HEIGHT: 62 IN | WEIGHT: 193.5 LBS

## 2021-06-02 VITALS — HEIGHT: 62 IN | WEIGHT: 199.2 LBS | BODY MASS INDEX: 36.66 KG/M2

## 2021-06-02 NOTE — PROGRESS NOTES
WALK IN CARE - VISIT NOTE    ASSESSMENT/PLAN  1. Cough  Likely viral etiology.  Chest x-ray negative.  Precautions given regarding OTC remedies given patient's history of diabetes.  Given the short duration of symptoms, I do not think she needs steroids at this point, but if cough persists could consider this with close glucose monitoring.  Discussed symptomatic cares and handout provided.  - XR Chest 2 Views; Future  - benzonatate (TESSALON PERLES) 100 MG capsule; Take 1 capsule (100 mg total) by mouth every 6 (six) hours as needed for cough.  Dispense: 30 capsule; Refill: 0    Options for treatment and follow-up care were reviewed with the patient and/or guardian. Discussed symptoms in which to return to clinic sooner or go to the ER for evaluation. Josefina Damico and/or guardian engaged in the decision making process and verbalized understanding of the options discussed and agreed with the final plan.    Get Pacheco MD     HPI    Josefina Damico is a 54 y.o. female brought in by self presenting for evaluation of cold like symptoms:    Symptoms started 9d ago  Has been sleeping more throughout the week  No fevers  Cough started a couple days ago  Feels like symptoms are getting worse  No joint pain  Does have some SOB  Not a smoker nor exposures  Has frequent bronchitis   Has been using Robitussin - seems to be helping  No N/V/D      ROS  Complete ROS negative except as noted in HPI.    Social History     Tobacco Use     Smoking status: Never Smoker     Smokeless tobacco: Never Used   Substance Use Topics     Alcohol use: Yes     Alcohol/week: 1.0 standard drinks     Types: 1 Glasses of wine per week     Drug use: No       Past Medical History:   Diagnosis Date     Diabetes mellitus (H)      Environmental allergies      Hyperlipidemia      Past Surgical History:   Procedure Laterality Date     ENDOMETRIAL ABLATION           OBJECTIVE  Vitals:    11/03/19 1346   BP: 129/85   Patient Position: Sitting   Pulse:  93   Resp: 20   Temp: 98.1  F (36.7  C)   TempSrc: Oral   SpO2: 97%   Weight: 200 lb (90.7 kg)        Physical Exam  General: No acute distress  Eyes: EOMI, PERRLA, normal conjunctiva, normal sclera   Ears: ear canals patent, TM normal, external ears normal  Nose: moist mucosa, no rhinorrhea  Oral: moist oral mucosa, no tonsillar exudates, no erythema  Lymph: no lymphadenopathy  Neck: good ROM, supple  CV: RRR, distal and peripheral pulses in tact  Resp: CTA bilaterally, no wheezing, no rhonchi, no rhales, no respiratory distress  Abdomen: soft, non-tender, normal bowel sounds  Neuro: moves all 4 extremities, no focal deficits noted  Extrem: no edema, no cyanosis, well-perfused    Labs  Xr Chest 2 Views    Result Date: 11/3/2019  EXAM: XR CHEST 2 VIEWS LOCATION: [facility] DATE/TIME: 11/3/2019 2:10 PM INDICATION: persistent cough COMPARISON: PA and lateral views of the chest 09/19/2017     The cardiac silhouette is normal in size. Hilar and mediastinal interfaces are normal. The lungs are symmetrically inflated. There are no airspace or interstitial opacities. Diaphragm curvature is normal. No pleural fluid is present. Thoracic vertebra are maintained in height and shape.

## 2021-06-03 VITALS
BODY MASS INDEX: 37.31 KG/M2 | DIASTOLIC BLOOD PRESSURE: 66 MMHG | SYSTOLIC BLOOD PRESSURE: 112 MMHG | TEMPERATURE: 98.3 F | WEIGHT: 200.7 LBS | OXYGEN SATURATION: 97 % | RESPIRATION RATE: 20 BRPM | HEART RATE: 74 BPM

## 2021-06-03 VITALS
SYSTOLIC BLOOD PRESSURE: 129 MMHG | HEART RATE: 93 BPM | TEMPERATURE: 98.1 F | WEIGHT: 200 LBS | DIASTOLIC BLOOD PRESSURE: 85 MMHG | RESPIRATION RATE: 20 BRPM | BODY MASS INDEX: 37.18 KG/M2 | OXYGEN SATURATION: 97 %

## 2021-06-03 VITALS — WEIGHT: 197.9 LBS | BODY MASS INDEX: 36.42 KG/M2 | HEIGHT: 62 IN

## 2021-06-04 ENCOUNTER — OFFICE VISIT - HEALTHEAST (OUTPATIENT)
Dept: EDUCATION SERVICES | Facility: CLINIC | Age: 56
End: 2021-06-04

## 2021-06-04 ENCOUNTER — COMMUNICATION - HEALTHEAST (OUTPATIENT)
Dept: ADMINISTRATIVE | Facility: CLINIC | Age: 56
End: 2021-06-04

## 2021-06-04 VITALS
DIASTOLIC BLOOD PRESSURE: 72 MMHG | HEIGHT: 62 IN | OXYGEN SATURATION: 98 % | HEART RATE: 80 BPM | SYSTOLIC BLOOD PRESSURE: 128 MMHG | WEIGHT: 202.3 LBS | BODY MASS INDEX: 37.23 KG/M2

## 2021-06-04 VITALS
OXYGEN SATURATION: 96 % | BODY MASS INDEX: 37.36 KG/M2 | DIASTOLIC BLOOD PRESSURE: 80 MMHG | SYSTOLIC BLOOD PRESSURE: 120 MMHG | TEMPERATURE: 98.1 F | WEIGHT: 201 LBS | HEART RATE: 95 BPM | RESPIRATION RATE: 14 BRPM

## 2021-06-04 VITALS — HEIGHT: 61 IN | BODY MASS INDEX: 37.76 KG/M2 | WEIGHT: 200 LBS

## 2021-06-04 VITALS
BODY MASS INDEX: 37.18 KG/M2 | WEIGHT: 200 LBS | HEART RATE: 99 BPM | OXYGEN SATURATION: 98 % | DIASTOLIC BLOOD PRESSURE: 70 MMHG | SYSTOLIC BLOOD PRESSURE: 126 MMHG

## 2021-06-04 VITALS
HEART RATE: 84 BPM | WEIGHT: 195 LBS | OXYGEN SATURATION: 95 % | RESPIRATION RATE: 19 BRPM | SYSTOLIC BLOOD PRESSURE: 132 MMHG | DIASTOLIC BLOOD PRESSURE: 85 MMHG | TEMPERATURE: 98.3 F | BODY MASS INDEX: 36.84 KG/M2

## 2021-06-04 VITALS — BODY MASS INDEX: 37.76 KG/M2 | WEIGHT: 200 LBS | HEIGHT: 61 IN

## 2021-06-04 VITALS
BODY MASS INDEX: 37.12 KG/M2 | WEIGHT: 201.7 LBS | OXYGEN SATURATION: 97 % | SYSTOLIC BLOOD PRESSURE: 128 MMHG | HEIGHT: 62 IN | HEART RATE: 92 BPM | DIASTOLIC BLOOD PRESSURE: 70 MMHG

## 2021-06-04 VITALS — BODY MASS INDEX: 37.79 KG/M2 | WEIGHT: 200 LBS

## 2021-06-04 DIAGNOSIS — E11.65 TYPE 2 DIABETES MELLITUS WITH HYPERGLYCEMIA, WITHOUT LONG-TERM CURRENT USE OF INSULIN (H): ICD-10-CM

## 2021-06-04 NOTE — PATIENT INSTRUCTIONS - HE
1. Keep well hydrated  2. May alternate Tylenol every 6 hours with ibuprofen every 6 hours as needed for fever or pain  3. If your symptoms worsen over time or you have any questions, call the clinic number  - it's answered 24/7      Patient Education     Acute Bacterial Rhinosinusitis (ABRS)    Acute bacterial rhinosinusitis (ABRS) is an infection of your nasal cavity and sinuses. It s caused by bacteria. Acute means that you ve had symptoms for less than 4 weeks, but possibly up to 12 weeks.  Understanding your sinuses  The nasal cavity is the large air-filled space behind your nose. The sinuses are a group of spaces formed by the bones of your face. They connect with your nasal cavity. ABRS causes the tissue lining these spaces to become inflamed. Mucus may not drain normally. This leads to facial pain and other symptoms.  What causes ABRS?  ABRS most often follows an upper respiratory infection caused by a virus. Bacteria then infect the lining of your nasal cavity and sinuses. But you can also get ABRS if you have:    Nasal allergies    Long-term nasal swelling and congestion not caused by allergies    Blockage in the nose  Symptoms of ABRS  The symptoms of ABRS may be different for each person and include:    Nasal congestion or blockage    Pain or pressure in the face    Thick, colored drainage from the nose  Other symptoms may include:    Runny nose    Fluid draining from the nose down the throat (postnasal drip)    Headache    Cough    Pain    Fever  Diagnosing ABRS  ABRS may be diagnosed if you ve had an upper respiratory infection like a cold and cough for 10 or more days without improvement or with worsening symptoms. Your healthcare provider will ask about your symptoms and your medical history. The provider will check your vital signs, including your temperature. You ll have a physical exam. The healthcare provider will check your ears, nose, and throat. You likely won t need any tests. If ABRS comes  back, you may have a culture or other tests.  Treatment for ABRS  Treatment may include:    Antibiotic medicine. This is for symptoms that last for at least 10 to 14 days.    Nasal corticosteroid medicine. Drops or spray used in the nose can lessen swelling and congestion.    Over-the-counter pain medicine. This is to lessen sinus pain and pressure.    Nasal decongestant medicine. Spray or drops may help to lessen congestion. Do not use them for more than a few days.    Salt wash (saline irrigation). This can help to loosen mucus.  Possible complications of ABRS  ABRS may come back or become long-term (chronic). In rare cases, ABRS may cause complications such as:     Inflamed tissue around the brain and spinal cord (meningitis)    Inflamed tissue around the eyes (orbital cellulitis)    Inflamed bones around the sinuses (osteitis)  These problems may need to be treated in a hospital with intravenous (IV) antibiotic medicine or surgery.  When to call the healthcare provider  Call your healthcare provider if you have any of the following:    Symptoms that don t get better, or get worse    Symptoms that don t get better after 3 to 5 days on antibiotics    Trouble seeing    Swelling around your eyes    Confusion or trouble staying awake   Date Last Reviewed: 5/1/2017 2000-2019 [email or web address]. [address] [place] [address], [place] [address]. All rights reserved. This information is not intended as a substitute for professional medical care. Always follow your healthcare professional's instructions.

## 2021-06-04 NOTE — PROGRESS NOTES
PT to have surgery with Dr Leonard. FMLA FORMS PUT IN WORK ROOM. Need to be completed. Call pt when done. She will pick them up.

## 2021-06-04 NOTE — PROGRESS NOTES
FMLA and F[employer] for duty forms completed and placed in Dr. Pittman folder awaiting signature.      Osiris Wilkins  [facility]  General Surgery  P: [phone]  F: [phone]

## 2021-06-04 NOTE — PROGRESS NOTES
Provider wore a mask during this visit.     Subjective:   Josefina Damico is a 54 y.o. female  Roomed by: Kassi    Accompanied by Spouse      Chief Complaint   Patient presents with     Abdominal Pain     hard knot in right lower ab.  Noticed 12/2 after bad coughing, hasn't gotten better   Says her whole abdomen hurt on 12/2 after the coughing. Then 12/3 noticed pain right above her belly button. Says area hurts if her stomach muscles area tighten or she if she presses the area with her hand. Denies any recent fevers or chills. Says her  noticed that patient was in pain while doing some shopping and insisted that she be evaluated. Was seen for a viral cough on 11/3 and has been coughing since then. Says the coughing did not interrupt her sleep. Admits a runny nose and nasal congestion since 11/3. Denies CP or shortness of breath. Says has an appointment with her doctor the end of December and she was thinking pain would resolve. Appetite has been normal. Admits being able to drink fluids and urinate normal amounts. Denies any recent nausea, vomiting, but admits some diarrhea from metformin. Says walking does not cause the abdominal pain. Says BG has been 150.     Review of Systems  See HPI for ROS, otherwise balance of other systems negative    No Known Allergies    Current Outpatient Medications:      aspirin 81 MG EC tablet, Take 81 mg by mouth daily., Disp: , Rfl:      benzonatate (TESSALON PERLES) 100 MG capsule, Take 1 capsule (100 mg total) by mouth every 6 (six) hours as needed for cough., Disp: 30 capsule, Rfl: 0     blood glucose test strips, Use 1 each As Directed daily. Dispense brand per patient's insurance at pharmacy discretion. She has one touch ultra at home, Disp: 100 strip, Rfl: 3     cholecalciferol, vitamin D3, (VITAMIN D3) 5,000 unit Tab, Take by mouth., Disp: , Rfl:      dulaglutide (TRULICITY) 0.75 mg/0.5 mL PnIj, Inject 0.75 mg under the skin every 7 days., Disp: 4 Syringe, Rfl: 0      fenofibrate (TRICOR) 145 MG tablet, Take 1 tablet (145 mg total) by mouth daily., Disp: 90 tablet, Rfl: 2     generic lancets, Use 1 each As Directed daily. Dispense brand per patient's insurance at pharmacy discretion., Disp: 100 each, Rfl: 3     hydroCHLOROthiazide (HYDRODIURIL) 25 MG tablet, Take 1 tablet (25 mg total) by mouth daily., Disp: 30 tablet, Rfl: 0     lancets (ONETOUCH DELICA LANCETS) 33 gauge Misc, Check blood sugars once daily., Disp: 100 each, Rfl: 3     loratadine (CLARITIN) 10 mg tablet, Take 10 mg by mouth daily., Disp: , Rfl:      metFORMIN (GLUCOPHAGE) 500 MG tablet, Take 2 tablets (1,000 mg total) by mouth 2 (two) times a day with meals., Disp: 360 tablet, Rfl: 1     multivitamin therapeutic tablet, Take 1 tablet by mouth daily., Disp: , Rfl:      OMEGA-3/DHA/EPA/FISH OIL (FISH OIL-OMEGA-3 FATTY ACIDS) 300-1,000 mg capsule, Take 2 g by mouth daily., Disp: , Rfl:      pravastatin (PRAVACHOL) 80 MG tablet, Take 1 tablet (80 mg total) by mouth every evening., Disp: 30 tablet, Rfl: 0  Patient Active Problem List   Diagnosis     Type 2 diabetes mellitus (H)     Hyperlipidemia, unspecified hyperlipidemia type     Edema, unspecified type     Class 1 obesity due to excess calories with serious comorbidity and body mass index (BMI) of 33.0 to 33.9 in adult     Morbid obesity (H)     Past Medical History:   Diagnosis Date     Diabetes mellitus (H)      Environmental allergies      Hyperlipidemia     - if none on file, see Problem List    Objective:     Vitals:    12/08/19 1055   BP: 120/80   Patient Site: Right Arm   Patient Position: Sitting   Cuff Size: Adult Regular   Pulse: 95   Resp: 14   Temp: 98.1  F (36.7  C)   SpO2: 96%   Weight: 201 lb (91.2 kg)   Gen - Pt in NAD  Eyes - Conjunctiva non injected, no drainage  Face - non TTP over frontal sinus areas; non TTP over maxillary areas  Ears - external canals - no induration, Right TM - not injected, Left TM - not injected   Nose - not congested, no  nasal drainage  Pharynx - nog injected, tonsils 1+ size  Neck - supple, no cervical adenopathy, no masses  Cor - RRR w/o murmur  Lungs - Good air entry; no wheezes or crackles noted on auscultation - no coughing noted  Abdomen: Flat, soft, localized diffuse erythema approximately 3 cm from the umbilicus circumferentially with a subcutaneous firm mass noted just under the area of erythema   Hypoactive BS, no HSM or masses, some involuntary guarding over the area of erythema  Skin - no lesions, no rashes noted    Results for orders placed or performed in visit on 12/08/19   HM1 (CBC with Diff)   Result Value Ref Range    WBC 8.5 4.0 - 11.0 thou/uL    RBC 4.32 3.80 - 5.40 mill/uL    Hemoglobin 13.1 12.0 - 16.0 g/dL    Hematocrit 39.3 35.0 - 47.0 %    MCV 91 80 - 100 fL    MCH 30.3 27.0 - 34.0 pg    MCHC 33.3 32.0 - 36.0 g/dL    RDW 11.9 11.0 - 14.5 %    Platelets 348 140 - 440 thou/uL    MPV 6.8 (L) 7.0 - 10.0 fL    Neutrophils % 68 50 - 70 %    Lymphocytes % 22 20 - 40 %    Monocytes % 6 2 - 10 %    Eosinophils % 4 0 - 6 %    Basophils % 1 0 - 2 %    Neutrophils Absolute 5.8 2.0 - 7.7 thou/uL    Lymphocytes Absolute 1.8 0.8 - 4.4 thou/uL    Monocytes Absolute 0.5 0.0 - 0.9 thou/uL    Eosinophils Absolute 0.3 0.0 - 0.4 thou/uL    Basophils Absolute 0.0 0.0 - 0.2 thou/uL   Lab result discussed on day of visit.     Ct Abdomen Pelvis Without Oral With Iv Contrast    Result Date: 12/8/2019  EXAM: CT ABDOMEN PELVIS WO ORAL W IV CONTRAST LOCATION: [facility] DATE/TIME: 12/8/2019 12:42 PM INDICATION: Abdominal infection suspected Several days of umbilical pain.  On exam has periumbilical erythema with subcutaneous mass very tender to palpate COMPARISON: None. TECHNIQUE: CT scan of the abdomen and pelvis was performed following injection of IV contrast. Multiplanar reformats were obtained. Dose reduction techniques were used. CONTRAST: Iohexol (Omni) 350 100 mL FINDINGS: LOWER CHEST: Normal. HEPATOBILIARY: Hepatic  steatosis. Cholelithiasis. No biliary duct dilatation. PANCREAS: Normal. SPLEEN: Normal. ADRENAL GLANDS: Normal. KIDNEYS/BLADDER: Normal. BOWEL: Normal caliber small bowel and colon. The appendix is not clearly visualized. Mild sigmoid diverticulosis without evidence of acute diverticulitis. No free air or free fluid. Small fat-containing supraumbilical hernia containing small amount of fluid. Moderate surrounding edema. A tiny central periumbilical hernia without fluid is noted. LYMPH NODES: Normal. VASCULATURE: Unremarkable. PELVIC ORGANS: Normal. OTHER: None. MUSCULOSKELETAL: Normal.     1.  Small fat-containing supraumbilical hernia. Tiny amount of fluid is also noted within the hernia sac. Moderate surrounding edema which may reflect incarceration. A second tiny adjacent periumbilical hernia is noted. 2.  Cholelithiasis. 3.  Hepatic steatosis. A phone call report regarding the critical findings on this exam was made to Dr. Moreno at 1:02 PM on 1219.   Radiologist's report discussed with patient on day of visit.      Assessment - Plan   Medical Decision Making -54-year-old woman presents with having had URI symptoms for the last 2 weeks that have not been improving.  Admits about 5 days of having pain at the area of her umbilicus.  This pain is worse with worse with any kind of palpation.  She denies any fevers recent fevers or chills.  On exam she is afebrile and vital signs are stable.  She did have a well-circumscribed area about 3 cm circumferentially around her umbilicus of erythema with some sub-cutaneous tenderness to palpation and firmness.  A CT scan noted a small fat-containing supraumbilical hernia with surrounding edema that could reflect incarceration.  Also incidental cholelithiasis lithiasis and hepatics steatosis.  Recommended early follow-up with primary.  Patient does meet criteria for an acute sinusitis and will treat that with doxycycline.  Patient is declined an offer of pain medicine for her  pain and already has a follow-up appointment on 12/10 with her primary.  Informed patient she should call the nurse line if her pain increases or she has any questions.  See patient instructions.    1. Supraumbilical fat containing hernia with possible obstruction    2. Acute non-recurrent sinusitis, unspecified location  - doxycycline (MONODOX) 100 MG capsule; Take 1 capsule (100 mg total) by mouth 2 (two) times a day for 7 days.  Dispense: 14 capsule; Refill: 0    3. Induration of skin  - HM1(CBC and Differential)  - HM1 (CBC with Diff)    4. Umbilical mass  - CT Abdomen Pelvis Without Oral With IV Contrast; Future    5. Acute periumbilical pain    6. Calculus of gallbladder without cholecystitis without obstruction      At the conclusion of the encounter, assessment and plan were discussed.   All questions were answered.   The patient or guardian acknowledged understanding and was involved in the decision making regarding the overall care plan.    40 minutes spent with the patient with greater than 50% of time spent discussing symptoms, treatment options, counseling and/or coordination of care.     Patient Instructions     1. Keep well hydrated  2. May alternate Tylenol every 6 hours with ibuprofen every 6 hours as needed for fever or pain  3. If your symptoms worsen over time or you have any questions, call the clinic number  - it's answered 24/7      Patient Education     Acute Bacterial Rhinosinusitis (ABRS)    Acute bacterial rhinosinusitis (ABRS) is an infection of your nasal cavity and sinuses. It s caused by bacteria. Acute means that you ve had symptoms for less than 4 weeks, but possibly up to 12 weeks.  Understanding your sinuses  The nasal cavity is the large air-filled space behind your nose. The sinuses are a group of spaces formed by the bones of your face. They connect with your nasal cavity. ABRS causes the tissue lining these spaces to become inflamed. Mucus may not drain normally. This leads to  facial pain and other symptoms.  What causes ABRS?  ABRS most often follows an upper respiratory infection caused by a virus. Bacteria then infect the lining of your nasal cavity and sinuses. But you can also get ABRS if you have:    Nasal allergies    Long-term nasal swelling and congestion not caused by allergies    Blockage in the nose  Symptoms of ABRS  The symptoms of ABRS may be different for each person and include:    Nasal congestion or blockage    Pain or pressure in the face    Thick, colored drainage from the nose  Other symptoms may include:    Runny nose    Fluid draining from the nose down the throat (postnasal drip)    Headache    Cough    Pain    Fever  Diagnosing ABRS  ABRS may be diagnosed if you ve had an upper respiratory infection like a cold and cough for 10 or more days without improvement or with worsening symptoms. Your healthcare provider will ask about your symptoms and your medical history. The provider will check your vital signs, including your temperature. You ll have a physical exam. The healthcare provider will check your ears, nose, and throat. You likely won t need any tests. If ABRS comes back, you may have a culture or other tests.  Treatment for ABRS  Treatment may include:    Antibiotic medicine. This is for symptoms that last for at least 10 to 14 days.    Nasal corticosteroid medicine. Drops or spray used in the nose can lessen swelling and congestion.    Over-the-counter pain medicine. This is to lessen sinus pain and pressure.    Nasal decongestant medicine. Spray or drops may help to lessen congestion. Do not use them for more than a few days.    Salt wash (saline irrigation). This can help to loosen mucus.  Possible complications of ABRS  ABRS may come back or become long-term (chronic). In rare cases, ABRS may cause complications such as:     Inflamed tissue around the brain and spinal cord (meningitis)    Inflamed tissue around the eyes (orbital cellulitis)    Inflamed  bones around the sinuses (osteitis)  These problems may need to be treated in a hospital with intravenous (IV) antibiotic medicine or surgery.  When to call the healthcare provider  Call your healthcare provider if you have any of the following:    Symptoms that don t get better, or get worse    Symptoms that don t get better after 3 to 5 days on antibiotics    Trouble seeing    Swelling around your eyes    Confusion or trouble staying awake   Date Last Reviewed: 5/1/2017 2000-2019 [employer]. [address] [place], [place] [address]. All rights reserved. This information is not intended as a substitute for professional medical care. Always follow your healthcare professional's instructions.

## 2021-06-04 NOTE — PROGRESS NOTES
Consult from Sole Ch CNP  Regarding an Umbilical Hernia.    HPI:  This is a 54 y.o. female here today with concerns of a bulge at the umbilicus.  She developed abdominal pain on Dec 2 following a sever coughing fit.  She was seen in the UR and there she had a CT where it was an umbilical hernia.  She is feeling better with decreasing pain and erythema but it is still noticeable.   This swollen area is painful. She has noted this for the past 2 week(s).     Allergies:Patient has no known allergies.    Past Medical History:   Diagnosis Date     Diabetes mellitus (H)      Environmental allergies      Hyperlipidemia        Past Surgical History:   Procedure Laterality Date     ENDOMETRIAL ABLATION         CURRENT MEDS:  Current Outpatient Medications   Medication Sig Dispense Refill     aspirin 81 MG EC tablet Take 81 mg by mouth daily.       blood glucose test strips Use 1 each As Directed daily. Dispense brand per patient's insurance at pharmacy discretion. She has one touch ultra at home 100 strip 3     cholecalciferol, vitamin D3, (VITAMIN D3) 5,000 unit Tab Take by mouth.       dulaglutide (TRULICITY) 0.75 mg/0.5 mL PnIj Inject 0.75 mg under the skin every 7 days. 4 Syringe 0     fenofibrate (TRICOR) 145 MG tablet Take 1 tablet (145 mg total) by mouth daily. 90 tablet 2     generic lancets Use 1 each As Directed daily. Dispense brand per patient's insurance at pharmacy discretion. 100 each 3     hydroCHLOROthiazide (HYDRODIURIL) 25 MG tablet Take 1 tablet (25 mg total) by mouth daily. 30 tablet 0     loratadine (CLARITIN) 10 mg tablet Take 10 mg by mouth daily.       metFORMIN (GLUCOPHAGE) 500 MG tablet Take 2 tablets (1,000 mg total) by mouth 2 (two) times a day with meals. 360 tablet 1     multivitamin therapeutic tablet Take 1 tablet by mouth daily.       OMEGA-3/DHA/EPA/FISH OIL (FISH OIL-OMEGA-3 FATTY ACIDS) 300-1,000 mg capsule Take 2 g by mouth daily.       pravastatin (PRAVACHOL) 80 MG tablet Take 1  tablet (80 mg total) by mouth every evening. 30 tablet 0     No current facility-administered medications for this visit.        Family History   Problem Relation Age of Onset     Lung cancer Mother      Diabetes Mother      Heart disease Father      COPD Father      Dementia Father      Parkinsonism Father      Hyperlipidemia Father      Diabetes Maternal Aunt      Diabetes Maternal Grandmother      Diabetes Paternal Grandfather         reports that she has never smoked. She has never used smokeless tobacco. She reports current alcohol use of about 1.0 standard drinks of alcohol per week. She reports that she does not use drugs.    Review of Systems -   10 point Review of systems is negative except for; as mentioned above in HPI and PMHx    /70 (Patient Site: Right Arm, Patient Position: Sitting, Cuff Size: Adult Regular)   Pulse 99   Wt 200 lb (90.7 kg)   SpO2 98%   BMI 37.18 kg/m    Body mass index is 37.18 kg/m .    EXAM:  GENERAL: Obese female  HEENT: EOMI, Anicteric Sclera  NECK:  No masses, good flexion and extention of the neck  CARDIAC: RRR w/out murmur   CHEST/LUNG: Clear  ABDOMEN: Umbilical hernia which is not reducible.  The umbilicus is tender to palpation  NEURO: She is ambulatory with good strength in both legs.    IMAGES:   EXAM: CT ABDOMEN PELVIS WO ORAL W IV CONTRAST  LOCATION: [facility]  DATE/TIME: 12/8/2019 12:42 PM     INDICATION: Abdominal infection suspected Several days of umbilical pain.  On exam has periumbilical erythema with subcutaneous mass very tender to palpate  COMPARISON: None.  TECHNIQUE: CT scan of the abdomen and pelvis was performed following injection of IV contrast. Multiplanar reformats were obtained. Dose reduction techniques were used.  CONTRAST: Iohexol (Omni) 350 100 mL     FINDINGS:   LOWER CHEST: Normal.     HEPATOBILIARY: Hepatic steatosis. Cholelithiasis. No biliary duct dilatation.     PANCREAS: Normal.     SPLEEN: Normal.     ADRENAL GLANDS:  Normal.     KIDNEYS/BLADDER: Normal.     BOWEL: Normal caliber small bowel and colon. The appendix is not clearly visualized. Mild sigmoid diverticulosis without evidence of acute diverticulitis. No free air or free fluid. Small fat-containing supraumbilical hernia containing small amount of   fluid. Moderate surrounding edema. A tiny central periumbilical hernia without fluid is noted.     LYMPH NODES: Normal.     VASCULATURE: Unremarkable.     PELVIC ORGANS: Normal.     OTHER: None.     MUSCULOSKELETAL: Normal.     IMPRESSION:   1.  Small fat-containing supraumbilical hernia. Tiny amount of fluid is also noted within the hernia sac. Moderate surrounding edema which may reflect incarceration. A second tiny adjacent periumbilical hernia is noted.  2.  Cholelithiasis.  3.  Hepatic steatosis.    Assessment/Plan: Pt with an umbilical hernia. I discussed this at length with her.  I went over conservative management as well as surgical treatment for hernias.   I would reccomend a laparoscopic umbilical hernia repair, understanding the possibility of converting to an open operation.   I went over the small risks of surgery including but not limited to bleeding and infection. I discussed the expected recovery time as well. We will schedule this hernia repair at his earliest convenience.    Kel Pittman MD  [facility]  [phone]

## 2021-06-04 NOTE — PROGRESS NOTES
Assessment and Plan:     1. Supraumbilical hernia  Ambulatory referral to General Surgery   2. Acute non-recurrent sinusitis, unspecified location     3. Cough       I provided urgent referral to general surgery due to concerns of an incarcerated hernia.  Discussed symptomatic treatment.  She is not interested in taking pain medication.  Recommend bracing the area when she coughs.  She continues doxycycline for sinusitis.  She is to present to the emergency room with worsening pain or fever.  She is content with the plan.    Subjective:     Josefina is a 54 y.o. female presenting to the clinic for follow-up on urgent care evaluation.  Patient was seen on 11/3/2019 due to cold symptoms.  Chest x-ray showed no acute infiltrate.  She was treated with Tessalon Perles.  Cough persisted.  Patient states she had a coughing fit at home on 10/2/19.  She developed abdominal pain shortly after.  She presented to walk-in clinic on 12/8/2019 due to worsening symptoms.  She was treated with doxycycline for acute sinusitis.  Provider noted to have a firm mass superior to her umbilicus with surrounding erythema.  CT of the abdomen and pelvis showed the following:    IMPRESSION:   1.  Small fat-containing supraumbilical hernia. Tiny amount of fluid is also noted within the hernia sac. Moderate surrounding edema which may reflect incarceration. A second tiny adjacent periumbilical hernia is noted.  2.  Cholelithiasis.  3.  Hepatic steatosis.     A phone call report regarding the critical findings on this exam was made to Dr. Moreno at 1:02 PM on 1219.    She continues to experience discomfort primarily with positional changes.  Pain is worse by the end of the day.  She states the erythema is improving, but she feels as though her abdomen is on fire.  No fever has been present.  She has not been taking any pain medication.    Review of Systems: A complete 14 point review of systems was obtained and is negative or as stated in the history  of present illness.    Social History     Socioeconomic History     Marital status:      Spouse name: Not on file     Number of children: Not on file     Years of education: Not on file     Highest education level: Not on file   Occupational History     Not on file   Social Needs     Financial resource strain: Not on file     Food insecurity:     Worry: Not on file     Inability: Not on file     Transportation needs:     Medical: Not on file     Non-medical: Not on file   Tobacco Use     Smoking status: Never Smoker     Smokeless tobacco: Never Used   Substance and Sexual Activity     Alcohol use: Yes     Alcohol/week: 1.0 standard drinks     Types: 1 Glasses of wine per week     Drug use: No     Sexual activity: Yes     Partners: Male     Comment:    Lifestyle     Physical activity:     Days per week: Not on file     Minutes per session: Not on file     Stress: Not on file   Relationships     Social connections:     Talks on phone: Not on file     Gets together: Not on file     Attends Jainism service: Not on file     Active member of club or organization: Not on file     Attends meetings of clubs or organizations: Not on file     Relationship status: Not on file     Intimate partner violence:     Fear of current or ex partner: Not on file     Emotionally abused: Not on file     Physically abused: Not on file     Forced sexual activity: Not on file   Other Topics Concern     Not on file   Social History Narrative     Not on file       Active Ambulatory Problems     Diagnosis Date Noted     Type 2 diabetes mellitus (H) 10/24/2017     Hyperlipidemia, unspecified hyperlipidemia type 10/24/2017     Edema, unspecified type 10/24/2017     Class 1 obesity due to excess calories with serious comorbidity and body mass index (BMI) of 33.0 to 33.9 in adult 10/24/2017     Morbid obesity (H) 08/23/2018     Resolved Ambulatory Problems     Diagnosis Date Noted     No Resolved Ambulatory Problems     Past Medical  "History:   Diagnosis Date     Diabetes mellitus (H)      Environmental allergies      Hyperlipidemia        Family History   Problem Relation Age of Onset     Lung cancer Mother      Diabetes Mother      Heart disease Father      COPD Father      Dementia Father      Parkinsonism Father      Hyperlipidemia Father      Diabetes Maternal Aunt      Diabetes Maternal Grandmother      Diabetes Paternal Grandfather        Objective:     /70   Pulse 92   Ht 5' 1.5\" (1.562 m)   Wt 201 lb 11.2 oz (91.5 kg)   SpO2 97%   BMI 37.49 kg/m      Patient is alert, in no obvious distress.   Skin: Warm, dry.  No lesions or rashes.  Skin turgor rapid return.   HEENT:  Head normocephalic, atraumatic.  Eyes normal. Ears normal.  Nose patent, mucosa pink.  Oropharynx mucosa pink.  No lesions or tonsillar enlargement.   Neck: Supple, no lymphadenopathy.   Lungs:  Clear to auscultation. Respirations even and unlabored.  No wheezing or rales noted.   Heart:  Regular rate and rhythm.  No murmurs, S3, S4, gallops, or rubs.    Abdomen: Soft,  Localized 1 inch area of erythema superior to umbilicus.  There is a subcutaneous firm nodule beneath the erythema. Bowel sounds normoactive. She does guard the area.               "

## 2021-06-04 NOTE — PROGRESS NOTES
Preoperative Exam    Scheduled Procedure: Hernia  Surgery Date:  1/16/20  Surgery Location: [facility], fax [phone]    Surgeon:      Assessment/Plan:     1. Encounter for preoperative examination for general surgical procedure  She will hold fish oil and aspirin for 7 days prior to surgery.  - Hemoglobin  - Potassium    2. Incarcerated umbilical hernia  Discussed symptomatic treatment with over-the-counter Tylenol.    3. Mixed hyperlipidemia  She continues pravastatin and fenofibrate.  - fenofibrate (TRICOR) 145 MG tablet; Take 1 tablet (145 mg total) by mouth daily.  Dispense: 90 tablet; Refill: 0    4. Type 2 diabetes mellitus with hyperglycemia, without long-term current use of insulin (H)  Last A1c was 7.2%.  She continues Trulicity.  She will hold metformin the morning of surgery.  May resume postop per surgeon.  - metFORMIN (GLUCOPHAGE) 500 MG tablet; Take 2 tablets (1,000 mg total) by mouth 2 (two) times a day with meals.  Dispense: 360 tablet; Refill: 0    5. Morbid obesity (H)  The following high BMI interventions were performed this visit: encouragement to exercise and lifestyle education regarding diet    6. Edema, unspecified type  She will hold hydrochlorothiazide the morning of surgery.  May resume postop per surgeon.    Surgical Procedure Risk: Intermediate (reported cardiac risk generally 1-5%)  Have you had prior anesthesia?: No  Have you or any family members had a previous anesthesia reaction:  No  Do you or any family members have a history of a clotting or bleeding disorder?: No  Cardiac Risk Assessment: no increased risk for major cardiac complications    APPROVAL GIVEN to proceed with proposed procedure, without further diagnostic evaluation    Functional Status: Independent  Patient plans to recover at home with family.     Subjective:      Josefina Damico is a 54 y.o. female who presents for a preoperative consultation.  Patient had developed cold symptoms and  had a coughing fit at home on 10/2/2019.  She developed abdominal pain shortly after.  She presented to walk-in clinic on 12/8/2019 due to worsening symptoms.  Patient had a CT of the abdomen which showed a small fat-containing supraumbilical hernia.  Moderate surrounding edema selected incarceration.  A tiny second adjacent periumbilical hernia was noted.  Patient was referred to general surgery.  Patient continues to have small lump superior to her umbilicus.  It is tender to palpation.  She continues to have some mild erythema.  She is not currently taking pain medication.    Patient has type 2 diabetes and takes metformin and injects Trulicity.  Last hemoglobin A1c was 7.2% on 8/1/19.  She takes pravastatin and fenofibrate for hyperlipidemia.  She is taking hydrochlorothiazide for lower extremity edema.      All other systems reviewed and are negative, other than those listed in the HPI.    Pertinent History  Do you have difficulty breathing or chest pain after walking up a flight of stairs: No  History of obstructive sleep apnea: No  Steroid use in the last 6 months: No  Frequent Aspirin/NSAID use: Yes: aspirin  Prior Blood Transfusion: No  Prior Blood Transfusion Reaction: No  If for some reason prior to, during or after the procedure, if it is medically indicated, would you be willing to have a blood transfusion?:  There is no transfusion refusal.    Current Outpatient Medications   Medication Sig Dispense Refill     aspirin 81 MG EC tablet Take 81 mg by mouth daily.       blood glucose test strips Use 1 each As Directed daily. Dispense brand per patient's insurance at pharmacy discretion. She has one touch ultra at home 100 strip 3     cholecalciferol, vitamin D3, (VITAMIN D3) 5,000 unit Tab Take by mouth.       dulaglutide (TRULICITY) 0.75 mg/0.5 mL PnIj Inject 0.75 mg under the skin every 7 days. 4 Syringe 0     fenofibrate (TRICOR) 145 MG tablet Take 1 tablet (145 mg total) by mouth daily. 90 tablet 0      generic lancets Use 1 each As Directed daily. Dispense brand per patient's insurance at pharmacy discretion. 100 each 3     hydroCHLOROthiazide (HYDRODIURIL) 25 MG tablet Take 1 tablet (25 mg total) by mouth daily. 30 tablet 0     loratadine (CLARITIN) 10 mg tablet Take 10 mg by mouth daily.       metFORMIN (GLUCOPHAGE) 500 MG tablet Take 2 tablets (1,000 mg total) by mouth 2 (two) times a day with meals. 360 tablet 0     multivitamin therapeutic tablet Take 1 tablet by mouth daily.       OMEGA-3/DHA/EPA/FISH OIL (FISH OIL-OMEGA-3 FATTY ACIDS) 300-1,000 mg capsule Take 2 g by mouth daily.       pravastatin (PRAVACHOL) 80 MG tablet Take 1 tablet (80 mg total) by mouth every evening. 30 tablet 0     No current facility-administered medications for this visit.         No Known Allergies    Patient Active Problem List   Diagnosis     Type 2 diabetes mellitus (H)     Hyperlipidemia, unspecified hyperlipidemia type     Edema, unspecified type     Class 1 obesity due to excess calories with serious comorbidity and body mass index (BMI) of 33.0 to 33.9 in adult     Morbid obesity (H)     Incarcerated umbilical hernia       Past Medical History:   Diagnosis Date     Diabetes mellitus (H)      Environmental allergies      Hyperlipidemia        Past Surgical History:   Procedure Laterality Date     ENDOMETRIAL ABLATION         Social History     Socioeconomic History     Marital status:      Spouse name: Not on file     Number of children: Not on file     Years of education: Not on file     Highest education level: Not on file   Occupational History     Not on file   Social Needs     Financial resource strain: Not on file     Food insecurity:     Worry: Not on file     Inability: Not on file     Transportation needs:     Medical: Not on file     Non-medical: Not on file   Tobacco Use     Smoking status: Never Smoker     Smokeless tobacco: Never Used   Substance and Sexual Activity     Alcohol use: Yes     Alcohol/week:  "1.0 standard drinks     Types: 1 Glasses of wine per week     Drug use: No     Sexual activity: Yes     Partners: Male     Comment:    Lifestyle     Physical activity:     Days per week: Not on file     Minutes per session: Not on file     Stress: Not on file   Relationships     Social connections:     Talks on phone: Not on file     Gets together: Not on file     Attends Hinduism service: Not on file     Active member of club or organization: Not on file     Attends meetings of clubs or organizations: Not on file     Relationship status: Not on file     Intimate partner violence:     Fear of current or ex partner: Not on file     Emotionally abused: Not on file     Physically abused: Not on file     Forced sexual activity: Not on file   Other Topics Concern     Not on file   Social History Narrative     Not on file       Patient Care Team:  Sole Ch CNP as PCP - General (Family Medicine)  Jennie Dey, Diabetes Ed as Diabetes Ed  Sole Ch CNP as Assigned PCP          Objective:     Vitals:    12/24/19 0717   BP: 128/72   Pulse: 80   SpO2: 98%   Weight: 202 lb 4.8 oz (91.8 kg)   Height: 5' 1.5\" (1.562 m)         Physical Exam:  Physical Exam   /72   Pulse 80   Ht 5' 1.5\" (1.562 m)   Wt 202 lb 4.8 oz (91.8 kg)   SpO2 98%   BMI 37.61 kg/m      General Appearance:    Alert, cooperative, no distress, appears stated age   Head:    Normocephalic, without obvious abnormality, atraumatic   Eyes:    PERRL, conjunctiva/corneas clear, EOM's intact, fundi     benign, both eyes   Ears:    Normal TM's and external ear canals, both ears   Nose:   Nares normal, septum midline, mucosa normal, no drainage     or sinus tenderness   Throat:   Lips, mucosa, and tongue normal; teeth and gums normal   Neck:   Supple, symmetrical, trachea midline, no adenopathy;     thyroid:  no enlargement/tenderness/nodules; no carotid    bruit or JVD   Back:     Symmetric, no curvature, ROM normal, no CVA " tenderness   Lungs:     Clear to auscultation bilaterally, respirations unlabored   Chest Wall:    No tenderness or deformity    Heart:    Regular rate and rhythm, S1 and S2 normal, no murmur, rub    or gallop   Breast Exam:    deferred   Abdomen:     Soft, non-tender, bowel sounds active all four quadrants,     no masses, no organomegaly   Genitalia:    deferred   Rectal:    deferred   Extremities:   Extremities normal, atraumatic, no cyanosis or edema   Pulses:   2+ and symmetric all extremities   Skin:   Skin color, texture, turgor normal, no rashes or lesions   Lymph nodes:   Cervical, supraclavicular, and axillary nodes normal   Neurologic:   CNII-XII intact, normal strength, sensation and reflexes     throughout       Patient Instructions            Before Your Surgery       Call your surgeon if there is any change in your health. This includes signs of a cold or flu (such as a sore throat, runny nose, cough, rash or fever).       Do not smoke, drink alcohol or take over the counter medicine (unless your surgeon or primary care doctor tells you to) for the 24 hours before and after surgery.       If you take prescribed drugs: Follow your doctor s orders about which medicines to take and which to stop until after surgery.      Eating and drinking prior to surgery: follow the instructions from your surgeon.      Take a shower or bath the night before surgery. Use the soap your surgeon gave you to gently clean your skin. If you do not have soap from your surgeon, use your regular soap. Do not shave or scrub the surgery site. Wear clean pajamas and have clean sheets on your bed.             Hold all supplements, aspirin and NSAIDs for 7 days prior to surgery.    Follow your surgeon's direction on when to stop eating and drinking prior to surgery.    Your surgeon will be managing your pain after your surgery.      Remove all jewelry and metal piercings before your surgery.     Remove nail polish from fingers before  surgery.      Labs:  Recent Results (from the past 24 hour(s))   Hemoglobin    Collection Time: 12/24/19  7:32 AM   Result Value Ref Range    Hemoglobin 13.3 12.0 - 16.0 g/dL       Immunization History   Administered Date(s) Administered     INFLUENZA,RECOMBINANT,INJ,PF QUADRIVALENT 18+YRS 11/26/2018, 12/10/2019     Influenza, inj, historic,unspecified 10/24/2017     Influenza,seasonal,quad inj =/> 6months 10/24/2017     Tdap 10/24/2017           Electronically signed by Sole Ch CNP 12/24/19 7:19 AM

## 2021-06-04 NOTE — PATIENT INSTRUCTIONS - HE
Before Your Surgery       Call your surgeon if there is any change in your health. This includes signs of a cold or flu (such as a sore throat, runny nose, cough, rash or fever).       Do not smoke, drink alcohol or take over the counter medicine (unless your surgeon or primary care doctor tells you to) for the 24 hours before and after surgery.       If you take prescribed drugs: Follow your doctor s orders about which medicines to take and which to stop until after surgery.      Eating and drinking prior to surgery: follow the instructions from your surgeon.      Take a shower or bath the night before surgery. Use the soap your surgeon gave you to gently clean your skin. If you do not have soap from your surgeon, use your regular soap. Do not shave or scrub the surgery site. Wear clean pajamas and have clean sheets on your bed.          Before Your Surgery       Call your surgeon if there is any change in your health. This includes signs of a cold or flu (such as a sore throat, runny nose, cough, rash or fever).       Do not smoke, drink alcohol or take over the counter medicine (unless your surgeon or primary care doctor tells you to) for the 24 hours before and after surgery.       If you take prescribed drugs: Follow your doctor s orders about which medicines to take and which to stop until after surgery.      Eating and drinking prior to surgery: follow the instructions from your surgeon.      Take a shower or bath the night before surgery. Use the soap your surgeon gave you to gently clean your skin. If you do not have soap from your surgeon, use your regular soap. Do not shave or scrub the surgery site. Wear clean pajamas and have clean sheets on your bed.             Hold all supplements, aspirin and NSAIDs for 7 days prior to surgery.    Follow your surgeon's direction on when to stop eating and drinking prior to surgery.    Your surgeon will be managing your pain after your surgery.      Remove all  jewelry and metal piercings before your surgery.     Remove nail polish from fingers before surgery.

## 2021-06-05 VITALS
SYSTOLIC BLOOD PRESSURE: 120 MMHG | BODY MASS INDEX: 37.87 KG/M2 | DIASTOLIC BLOOD PRESSURE: 70 MMHG | HEART RATE: 76 BPM | WEIGHT: 200.4 LBS

## 2021-06-05 NOTE — PROGRESS NOTES
"HPI: Pt is here for follow up s/p UHR with Dr. Pittman on 1/16/20.   she is doing well.  Pain is well controlled:  Yes. No difficulties with the surgical wound/wounds.  she is eating well and denies fever and chills.        Ht 5' 1\" (1.549 m)   Wt 200 lb (90.7 kg)   Breastfeeding No   BMI 37.79 kg/m      EXAM:  GENERAL:Appears well  ABDOMEN:  Soft, +BS  SURGICAL WOUNDS:  Incisions healing well, no induration or drainage.      Assessment/Plan: Doing well after surgery and should follow up as needed.    Mickey Milan PA-C  [phone]  General Surgery     "

## 2021-06-05 NOTE — TELEPHONE ENCOUNTER
RN cannot approve Refill Request    RN can NOT refill this medication Protocol failed and NO refill given.      Marine Moon, [facility] Triage/Med Refill 1/19/2020    Requested Prescriptions   Pending Prescriptions Disp Refills     TRULICITY 0.75 mg/0.5 mL PnIj [Pharmacy Med Name: TRULICITY 0.75 MG/0.5 ML PEN] 8 Syringe 0     Sig: INJECT 0.75 MG UNDER THE SKIN EVERY 7 DAYS.       Insulin/GLP-1 Refill Protocol Failed - 1/18/2020  9:52 AM        Failed - Microalbumin in last year     Microalbumin, Random Urine   Date Value Ref Range Status   08/23/2018 <0.50 0.00 - 1.99 mg/dL Final                  Passed - Visit with PCP or prescribing provider visit in last 6 months     Last office visit with prescriber/PCP: Visit date not found OR same dept: 12/24/2019 Sole Ch CNP OR same specialty: 12/24/2019 Sole Ch CNP Last physical: Visit date not found Last MTM visit: Visit date not found     Next appt within 3 mo: Visit date not found  Next physical within 3 mo: Visit date not found  Prescriber OR PCP: Goran Booker MD  Last diagnosis associated with med order: 1. Edema, unspecified type  - hydroCHLOROthiazide (HYDRODIURIL) 25 MG tablet; TAKE 1 TABLET BY MOUTH EVERY DAY  Dispense: 90 tablet; Refill: 2    2. Type 2 diabetes mellitus with hyperglycemia, without long-term current use of insulin (H)  - TRULICITY 0.75 mg/0.5 mL PnIj [Pharmacy Med Name: TRULICITY 0.75 MG/0.5 ML PEN]; INJECT 0.75 MG UNDER THE SKIN EVERY 7 DAYS.  Dispense: 2 Syringe; Refill: 0    If protocol passes may refill for 6 months if within 3 months of last provider visit (or a total of 9 months).              Passed - A1C in last 6 months     Hemoglobin A1c   Date Value Ref Range Status   08/01/2019 7.2 (H) 3.5 - 6.0 % Final               Passed - Blood pressure in last year     BP Readings from Last 1 Encounters:   01/16/20 133/63             Passed - Creatinine done in last year     Creatinine   Date Value Ref Range Status    08/01/2019 0.76 0.60 - 1.10 mg/dL Final           Signed Prescriptions Disp Refills    hydroCHLOROthiazide (HYDRODIURIL) 25 MG tablet 90 tablet 2     Sig: TAKE 1 TABLET BY MOUTH EVERY DAY       Diuretics/Combination Diuretics Refill Protocol  Passed - 1/18/2020  9:52 AM        Passed - Visit with PCP or prescribing provider visit in past 12 months     Last office visit with prescriber/PCP: Visit date not found OR same dept: 12/24/2019 Sole Ch CNP OR same specialty: 12/24/2019 Sole Ch CNP  Last physical: Visit date not found Last MTM visit: Visit date not found   Next visit within 3 mo: Visit date not found  Next physical within 3 mo: Visit date not found  Prescriber OR PCP: Goran Booker MD  Last diagnosis associated with med order: 1. Edema, unspecified type  - hydroCHLOROthiazide (HYDRODIURIL) 25 MG tablet; TAKE 1 TABLET BY MOUTH EVERY DAY  Dispense: 90 tablet; Refill: 2    2. Type 2 diabetes mellitus with hyperglycemia, without long-term current use of insulin (H)  - TRULICITY 0.75 mg/0.5 mL PnIj [Pharmacy Med Name: TRULICITY 0.75 MG/0.5 ML PEN]; INJECT 0.75 MG UNDER THE SKIN EVERY 7 DAYS.  Dispense: 2 Syringe; Refill: 0    If protocol passes may refill for 12 months if within 3 months of last provider visit (or a total of 15 months).             Passed - Serum Potassium in past 12 months      Lab Results   Component Value Date    Potassium 4.3 12/24/2019             Passed - Serum Sodium in past 12 months      Lab Results   Component Value Date    Sodium 139 08/01/2019             Passed - Blood pressure on file in past 12 months     BP Readings from Last 1 Encounters:   01/16/20 133/63             Passed - Serum Creatinine in past 12 months      Creatinine   Date Value Ref Range Status   08/01/2019 0.76 0.60 - 1.10 mg/dL Final

## 2021-06-05 NOTE — TELEPHONE ENCOUNTER
Refill Approved    Rx renewed per Medication Renewal Policy. Medication was last renewed on 11/27/19.    Marine Moon, [facility] Triage/Med Refill 1/19/2020     Requested Prescriptions   Pending Prescriptions Disp Refills     hydroCHLOROthiazide (HYDRODIURIL) 25 MG tablet [Pharmacy Med Name: HYDROCHLOROTHIAZIDE 25 MG TAB] 30 tablet 0     Sig: TAKE 1 TABLET BY MOUTH EVERY DAY       Diuretics/Combination Diuretics Refill Protocol  Passed - 1/18/2020  9:52 AM        Passed - Visit with PCP or prescribing provider visit in past 12 months     Last office visit with prescriber/PCP: Visit date not found OR same dept: 12/24/2019 Sole Ch CNP OR same specialty: 12/24/2019 Sole Ch CNP  Last physical: Visit date not found Last MTM visit: Visit date not found   Next visit within 3 mo: Visit date not found  Next physical within 3 mo: Visit date not found  Prescriber OR PCP: Goran Booker MD  Last diagnosis associated with med order: 1. Edema, unspecified type  - hydroCHLOROthiazide (HYDRODIURIL) 25 MG tablet [Pharmacy Med Name: HYDROCHLOROTHIAZIDE 25 MG TAB]; TAKE 1 TABLET BY MOUTH EVERY DAY  Dispense: 30 tablet; Refill: 0    2. Type 2 diabetes mellitus with hyperglycemia, without long-term current use of insulin (H)  - TRULICITY 0.75 mg/0.5 mL PnIj [Pharmacy Med Name: TRULICITY 0.75 MG/0.5 ML PEN]; INJECT 0.75 MG UNDER THE SKIN EVERY 7 DAYS.  Dispense: 2 Syringe; Refill: 0    If protocol passes may refill for 12 months if within 3 months of last provider visit (or a total of 15 months).             Passed - Serum Potassium in past 12 months      Lab Results   Component Value Date    Potassium 4.3 12/24/2019             Passed - Serum Sodium in past 12 months      Lab Results   Component Value Date    Sodium 139 08/01/2019             Passed - Blood pressure on file in past 12 months     BP Readings from Last 1 Encounters:   01/16/20 133/63             Passed - Serum Creatinine in past 12 months       Creatinine   Date Value Ref Range Status   08/01/2019 0.76 0.60 - 1.10 mg/dL Final             TRULICITY 0.75 mg/0.5 mL PnIj [Pharmacy Med Name: TRULICITY 0.75 MG/0.5 ML PEN] 2 Syringe 0     Sig: INJECT 0.75 MG UNDER THE SKIN EVERY 7 DAYS.       Insulin/GLP-1 Refill Protocol Failed - 1/18/2020  9:52 AM        Failed - Microalbumin in last year     Microalbumin, Random Urine   Date Value Ref Range Status   08/23/2018 <0.50 0.00 - 1.99 mg/dL Final                  Passed - Visit with PCP or prescribing provider visit in last 6 months     Last office visit with prescriber/PCP: Visit date not found OR same dept: 12/24/2019 Sole Ch CNP OR same specialty: 12/24/2019 Sole Ch CNP Last physical: Visit date not found Last MTM visit: Visit date not found     Next appt within 3 mo: Visit date not found  Next physical within 3 mo: Visit date not found  Prescriber OR PCP: Goran Booker MD  Last diagnosis associated with med order: 1. Edema, unspecified type  - hydroCHLOROthiazide (HYDRODIURIL) 25 MG tablet [Pharmacy Med Name: HYDROCHLOROTHIAZIDE 25 MG TAB]; TAKE 1 TABLET BY MOUTH EVERY DAY  Dispense: 30 tablet; Refill: 0    2. Type 2 diabetes mellitus with hyperglycemia, without long-term current use of insulin (H)  - TRULICITY 0.75 mg/0.5 mL PnIj [Pharmacy Med Name: TRULICITY 0.75 MG/0.5 ML PEN]; INJECT 0.75 MG UNDER THE SKIN EVERY 7 DAYS.  Dispense: 2 Syringe; Refill: 0    If protocol passes may refill for 6 months if within 3 months of last provider visit (or a total of 9 months).              Passed - A1C in last 6 months     Hemoglobin A1c   Date Value Ref Range Status   08/01/2019 7.2 (H) 3.5 - 6.0 % Final               Passed - Blood pressure in last year     BP Readings from Last 1 Encounters:   01/16/20 133/63             Passed - Creatinine done in last year     Creatinine   Date Value Ref Range Status   08/01/2019 0.76 0.60 - 1.10 mg/dL Final

## 2021-06-05 NOTE — PROGRESS NOTES
Toya signed FMLA and [employer] for duty paperwork.  Faxed to [employer] #[phone]  Placed to be scanned by HIM

## 2021-06-05 NOTE — ANESTHESIA CARE TRANSFER NOTE
Last vitals:   Vitals:    01/16/20 1247   BP: 143/67   Pulse: (!) 107   Resp: 18   Temp: 36.5  C (97.7  F)   SpO2: 93%     Patient's level of consciousness is drowsy  Spontaneous respirations: yes  Maintains airway independently: yes  Dentition unchanged: yes  Oropharynx: oropharynx clear of all foreign objects    QCDR Measures:  ASA# 20 - Surgical Safety Checklist: WHO surgical safety checklist completed prior to induction    PQRS# 430 - Adult PONV Prevention: 4558F - Pt received => 2 anti-emetic agents (different classes) preop & intraop  ASA# 8 - Peds PONV Prevention: NA - Not pediatric patient, not GA or 2 or more risk factors NOT present  PQRS# 424 - [place]-op Temp Management: 4559F - At least one body temp DOCUMENTED => 35.5C or 95.9F within required timeframe  PQRS# 426 - PACU Transfer Protocol: - Transfer of care checklist used  ASA# 14 - Acute Post-op Pain: ASA14B - Patient did NOT experience pain >= 7 out of 10

## 2021-06-05 NOTE — ANESTHESIA PREPROCEDURE EVALUATION
Anesthesia Evaluation      Patient summary reviewed   No history of anesthetic complications     Airway   Mallampati: IV  Neck ROM: full  Comment: Small mouth opening, decreased thyromental distance   Pulmonary - negative ROS    breath sounds clear to auscultation  (-) asthma, not a smoker                         Cardiovascular   Exercise tolerance: > or = 4 METS  (+) , hypercholesterolemia,     Rhythm: regular  Rate: normal,         Neuro/Psych    (-) no seizures, no CVA    Endo/Other    (+) diabetes mellitus type 2 well controlled, obesity,   (-) hypothyroidism     Comments: BMI 38    GI/Hepatic/Renal - negative ROS   (-) GERD     Other findings: Hbg 13.3  K 4.3  A1c 6.7      Dental - normal exam                        Anesthesia Plan  Planned anesthetic: general endotracheal  Pre-op: acetaminophen 1 g  Ketamine 25 mg on induction  Ketorolac 15 mg if ok w/ surgeon  Dexamethasone/Ondansetron  ASA 2   Induction: intravenous   Anesthetic plan and risks discussed with: patient  Anesthesia plan special considerations: video-assisted, antiemetics,   Post-op plan: routine recovery

## 2021-06-06 NOTE — PROGRESS NOTES
Assessment & Plan:       1. Acute bronchitis, unspecified organism  Influenza A/B Rapid Test- Nasal Swab    Rapid Strep A Screen-Throat    Group A Strep, RNA Direct Detection, Throat    albuterol (PROAIR HFA;PROVENTIL HFA;VENTOLIN HFA) 90 mcg/actuation inhaler    fluticasone propionate (FLONASE) 50 mcg/actuation nasal spray     Medical Decision Making  Patient presents with acute onset cough most consistent with a viral upper respiratory infection.  Despite patient complaining of subjective fevers, she has no fever at this time and has clear lung sounds on auscultation.  Offered to perform a chest x-ray as patient has noted some chest tightness, but she refused at this time.  Rapid strep and rapid influenza are both negative.  Patient is most concerned about the cough and treatments to help reduce cough given her history of hernias.  Discussed that most over-the-counter prescription cough suppressants do not show any significant improvement in cough other than honey.  We will also have patient try an albuterol inhaler and over-the-counter nasal steroids.  Discussed signs of worsening symptoms and when to follow-up with PCP if no symptom improvement.    Patient Instructions   You were seen today for acute bronchitis. This is likely due to a viral illness.    Symptom management:  - Get plenty of rest  - Avoid smoking and second hand smoke  - May take tylenol or ibuprofen for fever/discomfort  - Drink plenty of non-caffeinated fluids  - Use nasal steroid spray for sinus congestion  - Albuterol inhaler may be used every 6 hours as needed for chest tightness    Reasons to be seen in the emergency room:  - Develop a fever of 100.4 or higher  - Cough changes, coughing up blood, or become short of breath  - Neck stiffness  - Chest pain  - Severe headache  - Unable to tolerate eating or drinking fluids    Otherwise, if no symptom improvement after 5 days, follow-up with your primary care provider.          Subjective:        Josefina Damico is a 54 y.o. female here for evaluation of cough and chest tightness.  Onset of symptoms was 5 days ago gradual worsening since then.  Associated symptoms include rhinorrhea, sore throat, and body aches.  Patient is also noted subjective fevers.  She has no history of asthma but has used an albuterol inhaler when she was diagnosed with bronchitis in the past.  Patient is most concerned about her cough she has previously had a hernia develop after she was diagnosed with bronchitis in the past.  She has tried Robitussin with no relief in cough.    The following portions of the patient's history were reviewed and updated as appropriate: allergies, current medications and problem list.    Review of Systems  Pertinent items are noted in HPI.     Allergies  No Known Allergies    Family History   Problem Relation Age of Onset     Lung cancer Mother      Diabetes Mother      Heart disease Father      COPD Father      Dementia Father      Parkinsonism Father      Hyperlipidemia Father      Diabetes Maternal Aunt      Diabetes Maternal Grandmother      Diabetes Paternal Grandfather        Social History     Socioeconomic History     Marital status:      Spouse name: None     Number of children: None     Years of education: None     Highest education level: None   Occupational History     None   Social Needs     Financial resource strain: None     Food insecurity:     Worry: None     Inability: None     Transportation needs:     Medical: None     Non-medical: None   Tobacco Use     Smoking status: Never Smoker     Smokeless tobacco: Never Used   Substance and Sexual Activity     Alcohol use: Yes     Alcohol/week: 1.0 standard drinks     Types: 1 Glasses of wine per week     Drug use: No     Sexual activity: Yes     Partners: Male     Comment:    Lifestyle     Physical activity:     Days per week: None     Minutes per session: None     Stress: None   Relationships     Social connections:     Talks  on phone: None     Gets together: None     Attends Church service: None     Active member of club or organization: None     Attends meetings of clubs or organizations: None     Relationship status: None     Intimate partner violence:     Fear of current or ex partner: None     Emotionally abused: None     Physically abused: None     Forced sexual activity: None   Other Topics Concern     None   Social History Narrative     None         Objective:       /85   Pulse 84   Temp 98.3  F (36.8  C)   Resp 19   Wt 195 lb (88.5 kg)   SpO2 95%   BMI 36.84 kg/m    General appearance: alert, appears stated age, cooperative, no distress and non-toxic  Head: Normocephalic, without obvious abnormality, atraumatic  Ears: TMs intact with serous fluid and moderate bulging bilaterally, no erythema  Nose: no discharge  Throat: lips, mucosa, and tongue normal; teeth and gums normal  Neck: no adenopathy and supple, symmetrical, trachea midline  Lungs: clear to auscultation bilaterally and No rhonchi, rales, or wheezing  Heart: regular rate and rhythm, S1, S2 normal, no murmur, click, rub or gallop     Lab Results    Recent Results (from the past 24 hour(s))   Influenza A/B Rapid Test- Nasal Swab   Result Value Ref Range    Influenza  A, Rapid Antigen No Influenza A antigen detected No Influenza A antigen detected    Influenza B, Rapid Antigen No Influenza B antigen detected No Influenza B antigen detected   Rapid Strep A Screen-Throat   Result Value Ref Range    Rapid Strep A Antigen No Group A Strep detected, presumptive negative No Group A Strep detected, presumptive negative     I personally reviewed these results and discussed findings with the patient.

## 2021-06-06 NOTE — PATIENT INSTRUCTIONS - HE
You were seen today for acute bronchitis. This is likely due to a viral illness.    Symptom management:  - Get plenty of rest  - Avoid smoking and second hand smoke  - May take tylenol or ibuprofen for fever/discomfort  - Drink plenty of non-caffeinated fluids  - Use nasal steroid spray for sinus congestion  - Albuterol inhaler may be used every 6 hours as needed for chest tightness    Reasons to be seen in the emergency room:  - Develop a fever of 100.4 or higher  - Cough changes, coughing up blood, or become short of breath  - Neck stiffness  - Chest pain  - Severe headache  - Unable to tolerate eating or drinking fluids    Otherwise, if no symptom improvement after 5 days, follow-up with your primary care provider.

## 2021-06-07 NOTE — TELEPHONE ENCOUNTER
RN cannot approve Refill Request    RN can NOT refill this medication PCP messaged that patient is overdue for Labs. Last office visit: 12/24/2019 Sole Ch CNP Last Physical: 10/24/2017 Last MTM visit: Visit date not found Last visit same specialty: 12/24/2019 Sole Ch CNP.  Next visit within 3 mo: Visit date not found  Next physical within 3 mo: Visit date not found      Ana Sanchez, [facility] Triage/Med Refill 4/22/2020    Requested Prescriptions   Pending Prescriptions Disp Refills     ONETOUCH DELICA LANCETS 30 gauge Misc [Pharmacy Med Name: ONE TOUCH DELICA 30G LANCETS] 100 each 3     Sig: USE 1 EACH AS DIRECTED DAILY. DISPENSE BRAND PER PATIENT'S INSURANCE AT PHARMACY DISCRETION.       Diabetic Supplies Refill Protocol Failed - 4/22/2020 10:46 AM        Failed - A1C in last 6 months     Hemoglobin A1c   Date Value Ref Range Status   08/01/2019 7.2 (H) 3.5 - 6.0 % Final               Passed - Visit with PCP or prescribing provider visit in last 6 months     Last office visit with prescriber/PCP: 12/24/2019 Sole Ch CNP OR same dept: 12/24/2019 Sole Ch CNP OR same specialty: 12/24/2019 Sole Ch CNP  Last physical: 10/24/2017 Last MTM visit: Visit date not found   Next visit within 3 mo: Visit date not found  Next physical within 3 mo: Visit date not found  Prescriber OR PCP: Sole Ch CNP  Last diagnosis associated with med order: There are no diagnoses linked to this encounter.  If protocol passes may refill for 12 months if within 3 months of last provider visit (or a total of 15 months).

## 2021-06-07 NOTE — PROGRESS NOTES
"Josefina Damico is a 54 y.o. female who is being evaluated via a billable video visit.      The patient has been notified of following:     \"This video visit will be conducted via a call between you and your physician/provider. We have found that certain health care needs can be provided without the need for an in-person physical exam.  This service lets us provide the care you need with a video conversation.  If a prescription is necessary we can send it directly to your pharmacy.  If lab work is needed we can place an order for that and you can then stop by our lab to have the test done at a later time.    Video visits are billed at different rates depending on your insurance coverage. Please reach out to your insurance provider with any questions.    If during the course of the call the physician/provider feels a video visit is not appropriate, you will not be charged for this service.\"    Patient has given verbal consent to a Video visit? Yes    Patient would like to receive their AVS by AVS Preference: Sesar.    Patient would like the video invitation sent by: Text to cell phone: [phone]      Video Start Time: 8:30 am     Additional provider notes:  Assessment and Plan:     1. Type 2 diabetes mellitus with hyperglycemia, without long-term current use of insulin (H)  Discussed consuming a healthy diet and exercising.  She continues Metformin and Trulicity.  She will follow-up for labs once COVID-19 improves.   - metFORMIN (GLUCOPHAGE) 500 MG tablet; Take 2 tablets (1,000 mg total) by mouth 2 (two) times a day with meals.  Dispense: 360 tablet; Refill: 0  - dulaglutide (TRULICITY) 0.75 mg/0.5 mL PnIj; Inject 0.75 mg under the skin every 7 days.  Dispense: 2 mL; Refill: 3  - generic lancets; Use 1 each As Directed daily. Dispense brand per patient's insurance at pharmacy discretion.  Dispense: 100 each; Refill: 3  - blood glucose test strips; Use 1 each As Directed daily. Dispense brand per patient's " insurance at pharmacy discretion. She has one touch ultra at home  Dispense: 100 strip; Refill: 3  -CMP  -Microalbumin  -A1C    2. Edema, unspecified type  She states this is controlled.   - hydroCHLOROthiazide (HYDRODIURIL) 25 MG tablet; Take 1 tablet (25 mg total) by mouth daily.  Dispense: 90 tablet; Refill: 2    3. Mixed hyperlipidemia  This has been controlled.  She continues medications as prescribed.   - fenofibrate (TRICOR) 145 MG tablet; Take 1 tablet (145 mg total) by mouth daily.  Dispense: 90 tablet; Refill: 0  - pravastatin (PRAVACHOL) 80 MG tablet; Take 1 tablet (80 mg total) by mouth every evening.  Dispense: 90 tablet; Refill: 0    4. Morbid obesity (H)  The following high BMI interventions were performed this visit: encouragement to exercise and lifestyle education regarding diet          Subjective:     Josefina is a 54 y.o. female presenting for a video visit.  Patient presents for medication check. She has multiple co-morbidities including type 2 diabetes, hyperlipidemia, obesity.  She is taking Metformin 1000 mg twice daily and injecting Trulicity .75 mg once weekly.  She has not been checking her blood sugars.  She is furloughed from her job and has not been consuming a healthy diet or exercising.  She denies symptoms of hypoglycemia. She has not been checking her blood pressure.  She is taking Hydrochlorothiazide 25 mg daily for lower extremity edema.  She has hyperlipidemia and is taking Pravastatin and Fenofibrate.  She plans on starting a healthy eating program today.  She denies headaches, blurry vision, chest pain, shortness of breath with exertion, edema, orthopnea, and syncope.     Review of Systems: A complete 14 point review of systems was obtained and is negative or as stated in the history of present illness.    Social History     Socioeconomic History     Marital status:      Spouse name: Not on file     Number of children: Not on file     Years of education: Not on file      Highest education level: Not on file   Occupational History     Not on file   Social Needs     Financial resource strain: Not on file     Food insecurity     Worry: Not on file     Inability: Not on file     Transportation needs     Medical: Not on file     Non-medical: Not on file   Tobacco Use     Smoking status: Never Smoker     Smokeless tobacco: Never Used   Substance and Sexual Activity     Alcohol use: Yes     Alcohol/week: 1.0 standard drinks     Types: 1 Glasses of wine per week     Drug use: No     Sexual activity: Yes     Partners: Male     Comment:    Lifestyle     Physical activity     Days per week: Not on file     Minutes per session: Not on file     Stress: Not on file   Relationships     Social connections     Talks on phone: Not on file     Gets together: Not on file     Attends Latter day service: Not on file     Active member of club or organization: Not on file     Attends meetings of clubs or organizations: Not on file     Relationship status: Not on file     Intimate partner violence     Fear of current or ex partner: Not on file     Emotionally abused: Not on file     Physically abused: Not on file     Forced sexual activity: Not on file   Other Topics Concern     Not on file   Social History Narrative     Not on file       Active Ambulatory Problems     Diagnosis Date Noted     Type 2 diabetes mellitus (H) 10/24/2017     Hyperlipidemia, unspecified hyperlipidemia type 10/24/2017     Edema, unspecified type 10/24/2017     Class 1 obesity due to excess calories with serious comorbidity and body mass index (BMI) of 33.0 to 33.9 in adult 10/24/2017     Morbid obesity (H) 08/23/2018     Incarcerated umbilical hernia 12/17/2019     Resolved Ambulatory Problems     Diagnosis Date Noted     No Resolved Ambulatory Problems     Past Medical History:   Diagnosis Date     Diabetes mellitus (H)      Environmental allergies      Hyperlipidemia        Family History   Problem Relation Age of Onset      Lung cancer Mother      Diabetes Mother      Heart disease Father      COPD Father      Dementia Father      Parkinsonism Father      Hyperlipidemia Father      Diabetes Maternal Aunt      Diabetes Maternal Grandmother      Diabetes Paternal Grandfather        Objective:     Wt 200 lb (90.7 kg)   BMI 37.79 kg/m      Patient is alert, in no obvious distress.   Skin: appears dry.   HEENT:  Head normocephalic, atraumatic.   Lungs:  She does not appear to be short of breath.   Musculoskeletal:  Full ROM of upper extremities..       Video-Visit Details    Type of service:  Video Visit    Video End Time (time video stopped): 8:43 am    Originating Location (pt. Location): Home    Distant Location (provider location):  [facility] MEDICINE/OB     Mode of Communication:  Video Conference via Sal Ch CNP

## 2021-06-09 NOTE — TELEPHONE ENCOUNTER
RN cannot approve Refill Request    RN can NOT refill this medication PCP messaged that patient is overdue for Labs and Office Visit. Last office visit: 12/24/2019 Sole Ch CNP Last Physical: 10/24/2017 Last MTM visit: Visit date not found Last visit same specialty: 12/24/2019 Sole Ch CNP.  Next visit within 3 mo: Visit date not found  Next physical within 3 mo: Visit date not found      Neelam Ward, [facility] Triage/Med Refill 7/12/2020    Requested Prescriptions   Pending Prescriptions Disp Refills     metFORMIN (GLUCOPHAGE) 500 MG tablet [Pharmacy Med Name: METFORMIN  MG TABLET] 360 tablet 0     Sig: TAKE 2 TABLETS BY MOUTH TWICE A DAY WITH FOOD       Metformin Refill Protocol Failed - 7/12/2020  9:12 AM        Failed - Visit with PCP or prescribing provider visit in last 6 months or next 3 months     Last office visit with prescriber/PCP: Visit date not found OR same dept: 12/24/2019 Sole Ch CNP OR same specialty: 12/24/2019 Sole Ch CNP Last physical: Visit date not found Last MTM visit: Visit date not found         Next appt within 3 mo: Visit date not found  Next physical within 3 mo: Visit date not found  Prescriber OR PCP: Sole Ch CNP  Last diagnosis associated with med order: 1. Type 2 diabetes mellitus with hyperglycemia, without long-term current use of insulin (H)  - metFORMIN (GLUCOPHAGE) 500 MG tablet [Pharmacy Med Name: METFORMIN  MG TABLET]; TAKE 2 TABLETS BY MOUTH TWICE A DAY WITH FOOD  Dispense: 360 tablet; Refill: 0    2. Mixed hyperlipidemia  - fenofibrate (TRICOR) 145 MG tablet [Pharmacy Med Name: FENOFIBRATE 145 MG TABLET]; TAKE 1 TABLET BY MOUTH EVERY DAY  Dispense: 90 tablet; Refill: 0     If protocol passes may refill for 12 months if within 3 months of last provider visit (or a total of 15 months).           Failed - A1C in last 6 months     Hemoglobin A1c   Date Value Ref Range Status   08/01/2019 7.2 (H) 3.5 - 6.0 % Final                Failed - Microalbumin in last year      Microalbumin, Random Urine   Date Value Ref Range Status   08/23/2018 <0.50 0.00 - 1.99 mg/dL Final                  Passed - Blood pressure in last 12 months     BP Readings from Last 1 Encounters:   02/25/20 132/85             Passed - LFT or AST or ALT in last 12 months     Albumin   Date Value Ref Range Status   08/01/2019 4.0 3.5 - 5.0 g/dL Final     Bilirubin, Total   Date Value Ref Range Status   08/01/2019 0.3 0.0 - 1.0 mg/dL Final     Alkaline Phosphatase   Date Value Ref Range Status   08/01/2019 72 45 - 120 U/L Final     AST   Date Value Ref Range Status   08/01/2019 15 0 - 40 U/L Final     ALT   Date Value Ref Range Status   08/01/2019 21 0 - 45 U/L Final     Protein, Total   Date Value Ref Range Status   08/01/2019 7.4 6.0 - 8.0 g/dL Final                Passed - GFR or Serum Creatinine in last 6 months     GFR MDRD Non Af Amer   Date Value Ref Range Status   08/01/2019 >60 >60 mL/min/1.73m2 Final     GFR MDRD Af Amer   Date Value Ref Range Status   08/01/2019 >60 >60 mL/min/1.73m2 Final                fenofibrate (TRICOR) 145 MG tablet [Pharmacy Med Name: FENOFIBRATE 145 MG TABLET] 90 tablet 0     Sig: TAKE 1 TABLET BY MOUTH EVERY DAY       Fenofibrate Refill Protocol Failed - 7/12/2020  9:12 AM        Failed - Renal status in last 6 months     Creatinine   Date Value Ref Range Status   08/01/2019 0.76 0.60 - 1.10 mg/dL Final             Passed - Fasting lipid cascade in last 12 months     Cholesterol   Date Value Ref Range Status   08/01/2019 194 <=199 mg/dL Final     Triglycerides   Date Value Ref Range Status   08/01/2019 137 <=149 mg/dL Final     HDL Cholesterol   Date Value Ref Range Status   08/01/2019 48 (L) >=50 mg/dL Final     LDL Calculated   Date Value Ref Range Status   08/01/2019 119 <=129 mg/dL Final     Patient Fasting > 8hrs?   Date Value Ref Range Status   08/01/2019 Yes  Final             Passed - AST or ALT in last 12 months     AST    Date Value Ref Range Status   08/01/2019 15 0 - 40 U/L Final     ALT   Date Value Ref Range Status   08/01/2019 21 0 - 45 U/L Final               Passed - PCP or prescribing provider visit in past 12 months       Last office visit with prescriber/PCP: 12/24/2019 Sole Ch CNP OR same dept: 12/24/2019 Sole Ch CNP OR same specialty: 12/24/2019 Sole Ch CNP  Last physical: 10/24/2017 Last MTM visit: Visit date not found   Next visit within 3 mo: Visit date not found  Next physical within 3 mo: Visit date not found  Prescriber OR PCP: Sole Ch CNP  Last diagnosis associated with med order: 1. Type 2 diabetes mellitus with hyperglycemia, without long-term current use of insulin (H)  - metFORMIN (GLUCOPHAGE) 500 MG tablet [Pharmacy Med Name: METFORMIN  MG TABLET]; TAKE 2 TABLETS BY MOUTH TWICE A DAY WITH FOOD  Dispense: 360 tablet; Refill: 0    2. Mixed hyperlipidemia  - fenofibrate (TRICOR) 145 MG tablet [Pharmacy Med Name: FENOFIBRATE 145 MG TABLET]; TAKE 1 TABLET BY MOUTH EVERY DAY  Dispense: 90 tablet; Refill: 0    If protocol passes may refill for 12 months if within 3 months of last provider visit (or a total of 15 months).

## 2021-06-11 NOTE — TELEPHONE ENCOUNTER
RN cannot approve Refill Request    RN can NOT refill this medication Protocol failed and NO refill given. Last office visit: 12/24/2019 Sole Ch CNP Last Physical: 10/24/2017 Last MTM visit: Visit date not found Last visit same specialty: 12/24/2019 Sole Ch CNP.  Next visit within 3 mo: Visit date not found  Next physical within 3 mo: Visit date not found      Marine Moon, [facility] Triage/Med Refill 10/1/2020    Requested Prescriptions   Pending Prescriptions Disp Refills     TRULICITY 0.75 mg/0.5 mL PnIj [Pharmacy Med Name: TRULICITY 0.75 MG/0.5 ML PEN] 4 Syringe 1     Sig: INJECT 0.75 MG UNDER THE SKIN EVERY 7 DAYS.       Insulin/GLP-1 Refill Protocol Failed - 9/28/2020  8:59 AM        Failed - A1C in last 6 months     Hemoglobin A1c   Date Value Ref Range Status   08/01/2019 7.2 (H) 3.5 - 6.0 % Final               Failed - Microalbumin in last year     Microalbumin, Random Urine   Date Value Ref Range Status   08/23/2018 <0.50 0.00 - 1.99 mg/dL Final                  Failed - Creatinine done in last year     Creatinine   Date Value Ref Range Status   08/01/2019 0.76 0.60 - 1.10 mg/dL Final             Passed - Visit with PCP or prescribing provider visit in last 6 months     Last office visit with prescriber/PCP: Visit date not found OR same dept: 12/24/2019 Sole Ch CNP OR same specialty: 12/24/2019 Sole hC CNP Last physical: Visit date not found Last MTM visit: Visit date not found     Next appt within 3 mo: Visit date not found  Next physical within 3 mo: Visit date not found  Prescriber OR PCP: Sole Ch CNP  Last diagnosis associated with med order: 1. Type 2 diabetes mellitus with hyperglycemia, without long-term current use of insulin (H)  - TRULICITY 0.75 mg/0.5 mL PnIj [Pharmacy Med Name: TRULICITY 0.75 MG/0.5 ML PEN]; Inject 0.75 mg under the skin every 7 days.  Dispense: 4 Syringe; Refill: 1    If protocol passes may refill for 6 months if within 3 months of  last provider visit (or a total of 9 months).              Passed - Blood pressure in last year     BP Readings from Last 1 Encounters:   02/25/20 132/85

## 2021-06-12 NOTE — PROGRESS NOTES
MTM Initial Encounter  Assessment & Plan                                                     Type 2 Diabetes: Needs improvement. A1c did worsen and is not at goal <7%. Fasting BG also not at goal  mg/dL. Likely her high BG are contributing to her fatigue. Recommended starting with increasing Truilcity to 1..5 mg weekly. She will inject her 0.75 mg twice starting Monday until gone. Reviewed to monitor for nausea/appetite changes in the beginning. Also recommended checking some 2 hour post prandial values to see if <180 mgdL and monitor for response to Trulicity. If her BG are still high, could change to Ozempic, increase Trulicity further, or add SGLT2i.   PLAN:   1. Increase Trulicity to 1.5 mg weekly     Hyperlipidemia: Patient is on moderate intensity statin. Trigs are well controlled on fenofibrate. Will continue to monitor and could consider simplifying in the future when BG are better controlled.     Edema: Stable. BP is controlled.     Allergies: Stable. Recommended to continue current regimen.     Supplements: Ok to continue current supplements. Last Vitamin D level at goal.     Follow Up  2-3 week MyChart follow up     Subjective & Objective                                                     Josefina Damico is a 55 y.o. female called for an initial visit for Medication Therapy Management. She was referred to me from Sole Ch CNP    Patient consented to a telehealth visit: Yes    Reason for visit: Discuss worsening in A1c.     Medication Adherence/Access: Currently paying $0 for all her medications. Familiar with her medications and no adherence issues noted.     Type 2 Diabetes: Currently taking Trulicity 0.75 mg weekly Mondays and metformin 500 mg x2 (1000 mg) two times a day. Has been on Truicity about two years. At the very beginning of starting Trulicity she had GI issues, but not anymore. Has not been on a higher dose.   Once in a while diarrhea but can live with it  Was testing BG 1 times  daily, fasting AM. Reports blood sugars: 201, 206, 212, 228, 236, 213, 199..   Last A1c checked 10/26/20 = 9.7%, previously 7.2% on 8/1/19  Hyperglycemia symptoms: exhausted. So tired she is worried about falling asleep when she is driving.   Was using a OneTouch meter, but was no longer covered. Was not checking before, but has now AccuChek Guide and started checking on 10/30/20.    Microalbumin checked 10/26/20  Is taking aspirin 81 2mg for primary prevention.   Normal renal function   Got stressed and overwhelmed and quit watching what she was eating. still not really exercise since so tired, but better about what she is eating.    Wt Readings from Last 3 Encounters:   10/26/20 200 lb 6.4 oz (90.9 kg)   04/20/20 200 lb (90.7 kg)   02/25/20 195 lb (88.5 kg)       Hyperlipidemia: Currently taking pravastatin 80 mg HS and fenofibrate 145 mg daily. Denies myalgias. Last lipids checked 10/26/20, trigs = 163.    Edema: Currently taking HCTZ 25 mg daily. Reports not taking for BP. Will have edema if she does not take. Currently having only a little swelling, mainly in her ankles  BP Readings from Last 3 Encounters:   10/26/20 120/70   02/25/20 132/85   01/16/20 133/63       Allergies: Continues loratadine 10 mg daily. Uses yearround. Allergic to her cat.     Supplements: Continues Vitamin D 5000 units daily, multivitamin daily, fish oil 2 g daily  Vitamin D, Total (25-Hydroxy)   Date Value Ref Range Status   10/26/2020 45.3 30.0 - 80.0 ng/mL Final         PMH: reviewed in EPIC   Allergies/ADRs: reviewed in EPIC   Alcohol: reviewed in EPIC   Tobacco:   Social History     Tobacco Use   Smoking Status Never Smoker   Smokeless Tobacco Never Used     Today's Vitals: There were no vitals filed for this visit.  ----------------    The patient declined an after visit summary    I spent 26 minutes with this patient today.   All changes were made via collaborative practice agreement with Sole Ch CNP. A copy of the visit  note was provided to the patient's provider.     Jaci Chanel, PharmYanciD., BCACP  Medication Therapy Management Pharmacist  [facility]    Telemedicine Visit Details    Type of service:  Telephone     Start Time: 8:04 AM  End Time: 8:31 AM    Originating Location (pt. Location): Home    Distant Location (provider location):  [place] MEDICATION THERAPY MANAGEMENT [facility]    Mode of Communication: Telephone    Current Outpatient Medications   Medication Sig Dispense Refill     aspirin 81 MG EC tablet Take 81 mg by mouth daily.       blood glucose test strips Use 1 each As Directed daily. Dispense brand per patient's insurance at pharmacy discretion. 100 strip 3     blood-glucose meter Misc Use 1 Device As Directed daily as needed. 1 each 0     cholecalciferol, vitamin D3, (VITAMIN D3) 5,000 unit Tab Take by mouth.       fenofibrate (TRICOR) 145 MG tablet TAKE 1 TABLET BY MOUTH EVERY DAY 90 tablet 0     generic lancets Use 1 each As Directed daily. Dispense brand per patient's insurance at pharmacy discretion. 100 each 3     hydroCHLOROthiazide (HYDRODIURIL) 25 MG tablet Take 1 tablet (25 mg total) by mouth daily. 90 tablet 2     loratadine (CLARITIN) 10 mg tablet Take 10 mg by mouth daily.       metFORMIN (GLUCOPHAGE) 500 MG tablet TAKE 2 TABLETS BY MOUTH TWICE A DAY WITH FOOD 360 tablet 0     multivitamin therapeutic tablet Take 1 tablet by mouth daily.       OMEGA-3/DHA/EPA/FISH OIL (FISH OIL-OMEGA-3 FATTY ACIDS) 300-1,000 mg capsule Take 2 g by mouth daily.       ONETOUCH DELICA LANCETS 30 gauge Misc USE 1 EACH AS DIRECTED DAILY. DISPENSE BRAND PER PATIENT'S INSURANCE AT PHARMACY DISCRETION. 100 each 3     pravastatin (PRAVACHOL) 80 MG tablet TAKE 1 TABLET (80 MG TOTAL) BY MOUTH EVERY EVENING. 90 tablet 2     TRULICITY 0.75 mg/0.5 mL PnIj INJECT 0.75 MG UNDER THE SKIN EVERY 7 DAYS. 4 Syringe 1     No current facility-administered medications for this visit.

## 2021-06-12 NOTE — TELEPHONE ENCOUNTER
I loaded what I believe to be the correct meter. She sent a my chart message saying her insurance doesn't cover her other meter anymore. Needs supplies also.

## 2021-06-12 NOTE — PROGRESS NOTES
Assessment and Plan:     1. Fatigue, unspecified type  We will rule out anemia, vitamin deficiencies, thyroid disease, inflammation.  Further plans pending the results.  If labs are normal, may consider referral to sleep center for further evaluation of underlying ZAKI.  - HM2(CBC w/o Differential)  - Vitamin D, Total (25-Hydroxy)  - Vitamin B12  - Thyroid Cascade  - Erythrocyte Sedimentation Rate    2. Type 2 diabetes mellitus with hyperglycemia, without long-term current use of insulin (H)  We will check A1c and notify patient of results.  This has been controlled.  I encouraged her to monitor her blood sugars.  She is due for an eye exam in December.  - blood glucose test strips; Use 1 each As Directed daily. Dispense brand per patient's insurance at pharmacy discretion. She has one touch ultra at home  Dispense: 100 strip; Refill: 3  - Glycosylated Hemoglobin A1c  - Microalbumin, Random Urine  - Comprehensive Metabolic Panel    3. Hyperlipidemia, unspecified hyperlipidemia type  Goal LDL is less than 100.  She continues pravastatin and fenofibrate.  - Lipid Cascade    4. Visit for screening mammogram  - Mammo Screening Bilateral; Future    5. Colon cancer screening  - Ambulatory referral for Colonoscopy    6. Edema, unspecified type  This is controlled with hydrochlorothiazide.    7. Morbid obesity (H)  The following high BMI interventions were performed this visit: encouragement to exercise and lifestyle education regarding diet      Subjective:     Josefina is a 55 y.o. female presenting to the clinic for diabetes check.  Patient has type 2 diabetes, hyperlipidemia, lower extremity edema, obesity.  Patient is currently taking Metformin and injecting Trulicity.  She is not checking her blood sugars.  She is not consuming a healthy diet or exercising.  Her last eye exam was last December.  Last hemoglobin A1c was 6.7% on 3/18/2019.  She is taking pravastatin and fenofibrate for hyperlipidemia.  Last cholesterol  check was on 8/1/2019 with a total cholesterol 194, triglycerides 137, HDL 48, .  She is taking hydrochlorothiazide for lower extremity edema.  Patient is concerned about fatigue for 3 to 4 weeks.  She does admit to added stress at home.  Her  is recovering from the surgery.  She is sleeping well at night, but continues to have difficulty staying awake during the day.  She is napping on the weekends.  She denies any joint pain or body aches.  She has not had any recent cold symptoms or fever.  She denies tick bites this summer.  She denies family history of autoimmune disease.    Review of Systems: A complete 14 point review of systems was obtained and is negative or as stated in the history of present illness.    Social History     Socioeconomic History     Marital status:      Spouse name: Not on file     Number of children: Not on file     Years of education: Not on file     Highest education level: Not on file   Occupational History     Not on file   Social Needs     Financial resource strain: Not on file     Food insecurity     Worry: Not on file     Inability: Not on file     Transportation needs     Medical: Not on file     Non-medical: Not on file   Tobacco Use     Smoking status: Never Smoker     Smokeless tobacco: Never Used   Substance and Sexual Activity     Alcohol use: Yes     Alcohol/week: 1.0 standard drinks     Types: 1 Glasses of wine per week     Drug use: No     Sexual activity: Yes     Partners: Male     Comment:    Lifestyle     Physical activity     Days per week: Not on file     Minutes per session: Not on file     Stress: Not on file   Relationships     Social connections     Talks on phone: Not on file     Gets together: Not on file     Attends Gnosticism service: Not on file     Active member of club or organization: Not on file     Attends meetings of clubs or organizations: Not on file     Relationship status: Not on file     Intimate partner violence     Fear  of current or ex partner: Not on file     Emotionally abused: Not on file     Physically abused: Not on file     Forced sexual activity: Not on file   Other Topics Concern     Not on file   Social History Narrative     Not on file       Active Ambulatory Problems     Diagnosis Date Noted     Type 2 diabetes mellitus (H) 10/24/2017     Hyperlipidemia, unspecified hyperlipidemia type 10/24/2017     Edema, unspecified type 10/24/2017     Class 1 obesity due to excess calories with serious comorbidity and body mass index (BMI) of 33.0 to 33.9 in adult 10/24/2017     Morbid obesity (H) 08/23/2018     Incarcerated umbilical hernia 12/17/2019     Resolved Ambulatory Problems     Diagnosis Date Noted     No Resolved Ambulatory Problems     Past Medical History:   Diagnosis Date     Diabetes mellitus (H)      Environmental allergies      Hyperlipidemia        Family History   Problem Relation Age of Onset     Lung cancer Mother      Diabetes Mother      Heart disease Father      COPD Father      Dementia Father      Parkinsonism Father      Hyperlipidemia Father      Diabetes Maternal Aunt      Diabetes Maternal Grandmother      Diabetes Paternal Grandfather        Objective:     /70 (Patient Site: Right Arm, Patient Position: Sitting, Cuff Size: Adult Large)   Pulse 76   Wt 200 lb 6.4 oz (90.9 kg)   BMI 37.87 kg/m      Patient is alert, in no obvious distress.   Skin: Warm, dry.    Lungs:  Clear to auscultation. Respirations even and unlabored.  No wheezing or rales noted.   Heart:  Regular rate and rhythm.  No murmurs, S3, S4, gallops, or rubs.    Abdomen: Soft, nontender.  No organomegaly. Bowel sounds normoactive. No guarding or masses noted.   Musculoskeletal:  No edema is present in bilateral lower extremities.

## 2021-06-12 NOTE — TELEPHONE ENCOUNTER
RN cannot approve Refill Request    RN can NOT refill this medication Protocol failed and NO refill given. Last office visit: 12/24/2019 Sole Ch CNP Last Physical: 10/24/2017 Last MTM visit: Visit date not found Last visit same specialty: 12/24/2019 Sole Ch CNP.  Next visit within 3 mo: Visit date not found  Next physical within 3 mo: Visit date not found      Marine Moon, [facility] Triage/Med Refill 10/7/2020    Requested Prescriptions   Pending Prescriptions Disp Refills     fenofibrate (TRICOR) 145 MG tablet [Pharmacy Med Name: FENOFIBRATE 145 MG TABLET] 90 tablet 0     Sig: TAKE 1 TABLET BY MOUTH EVERY DAY       Fenofibrate Refill Protocol Failed - 10/5/2020  8:36 AM        Failed - Renal status in last 6 months     Creatinine   Date Value Ref Range Status   08/01/2019 0.76 0.60 - 1.10 mg/dL Final             Failed - Fasting lipid cascade in last 12 months     Cholesterol   Date Value Ref Range Status   08/01/2019 194 <=199 mg/dL Final     Triglycerides   Date Value Ref Range Status   08/01/2019 137 <=149 mg/dL Final     HDL Cholesterol   Date Value Ref Range Status   08/01/2019 48 (L) >=50 mg/dL Final     LDL Calculated   Date Value Ref Range Status   08/01/2019 119 <=129 mg/dL Final     Patient Fasting > 8hrs?   Date Value Ref Range Status   08/01/2019 Yes  Final             Failed - AST or ALT in last 12 months     AST   Date Value Ref Range Status   08/01/2019 15 0 - 40 U/L Final     ALT   Date Value Ref Range Status   08/01/2019 21 0 - 45 U/L Final               Passed - PCP or prescribing provider visit in past 12 months       Last office visit with prescriber/PCP: 12/24/2019 Sole Ch CNP OR same dept: 12/24/2019 Sole Ch CNP OR same specialty: 12/24/2019 Sole Ch CNP  Last physical: 10/24/2017 Last MTM visit: Visit date not found   Next visit within 3 mo: Visit date not found  Next physical within 3 mo: Visit date not found  Prescriber OR PCP: Sole Ch  CNP  Last diagnosis associated with med order: 1. Mixed hyperlipidemia  - fenofibrate (TRICOR) 145 MG tablet [Pharmacy Med Name: FENOFIBRATE 145 MG TABLET]; TAKE 1 TABLET BY MOUTH EVERY DAY  Dispense: 90 tablet; Refill: 0    2. Type 2 diabetes mellitus with hyperglycemia, without long-term current use of insulin (H)  - metFORMIN (GLUCOPHAGE) 500 MG tablet [Pharmacy Med Name: METFORMIN  MG TABLET]; TAKE 2 TABLETS BY MOUTH TWICE A DAY WITH FOOD  Dispense: 360 tablet; Refill: 0    If protocol passes may refill for 12 months if within 3 months of last provider visit (or a total of 15 months).                metFORMIN (GLUCOPHAGE) 500 MG tablet [Pharmacy Med Name: METFORMIN  MG TABLET] 360 tablet 0     Sig: TAKE 2 TABLETS BY MOUTH TWICE A DAY WITH FOOD       Metformin Refill Protocol Failed - 10/5/2020  8:36 AM        Failed - LFT or AST or ALT in last 12 months     Albumin   Date Value Ref Range Status   08/01/2019 4.0 3.5 - 5.0 g/dL Final     Bilirubin, Total   Date Value Ref Range Status   08/01/2019 0.3 0.0 - 1.0 mg/dL Final     Alkaline Phosphatase   Date Value Ref Range Status   08/01/2019 72 45 - 120 U/L Final     AST   Date Value Ref Range Status   08/01/2019 15 0 - 40 U/L Final     ALT   Date Value Ref Range Status   08/01/2019 21 0 - 45 U/L Final     Protein, Total   Date Value Ref Range Status   08/01/2019 7.4 6.0 - 8.0 g/dL Final                Failed - GFR or Serum Creatinine in last 6 months     GFR MDRD Non Af Amer   Date Value Ref Range Status   08/01/2019 >60 >60 mL/min/1.73m2 Final     GFR MDRD Af Amer   Date Value Ref Range Status   08/01/2019 >60 >60 mL/min/1.73m2 Final             Failed - A1C in last 6 months     Hemoglobin A1c   Date Value Ref Range Status   08/01/2019 7.2 (H) 3.5 - 6.0 % Final               Failed - Microalbumin in last year      Microalbumin, Random Urine   Date Value Ref Range Status   08/23/2018 <0.50 0.00 - 1.99 mg/dL Final                  Passed - Blood  pressure in last 12 months     BP Readings from Last 1 Encounters:   02/25/20 132/85             Passed - Visit with PCP or prescribing provider visit in last 6 months or next 3 months     Last office visit with prescriber/PCP: Visit date not found OR same dept: 12/24/2019 Sole Ch CNP OR same specialty: 12/24/2019 Sole Ch CNP Last physical: Visit date not found Last MTM visit: Visit date not found         Next appt within 3 mo: Visit date not found  Next physical within 3 mo: Visit date not found  Prescriber OR PCP: Sole Ch CNP  Last diagnosis associated with med order: 1. Mixed hyperlipidemia  - fenofibrate (TRICOR) 145 MG tablet [Pharmacy Med Name: FENOFIBRATE 145 MG TABLET]; TAKE 1 TABLET BY MOUTH EVERY DAY  Dispense: 90 tablet; Refill: 0    2. Type 2 diabetes mellitus with hyperglycemia, without long-term current use of insulin (H)  - metFORMIN (GLUCOPHAGE) 500 MG tablet [Pharmacy Med Name: METFORMIN  MG TABLET]; TAKE 2 TABLETS BY MOUTH TWICE A DAY WITH FOOD  Dispense: 360 tablet; Refill: 0     If protocol passes may refill for 12 months if within 3 months of last provider visit (or a total of 15 months).

## 2021-06-13 NOTE — TELEPHONE ENCOUNTER
Refill Approved    Rx renewed per Medication Renewal Policy. Medication was last renewed on 11/5/20.    Marine Moon, [facility] Triage/Med Refill 12/15/2020     Requested Prescriptions   Pending Prescriptions Disp Refills     dulaglutide (TRULICITY) 1.5 mg/0.5 mL PnIj 2 mL 0     Sig: Inject 1.5 mg under the skin every 7 days.       Insulin/GLP-1 Refill Protocol Passed - 12/14/2020  5:11 PM        Passed - Visit with PCP or prescribing provider visit in last 6 months     Last office visit with prescriber/PCP: 10/26/2020 OR same dept: 10/26/2020 Sole Ch CNP OR same specialty: 10/26/2020 Sole Ch CNP Last physical: Visit date not found Last MTM visit: Visit date not found     Next appt within 3 mo: Visit date not found  Next physical within 3 mo: Visit date not found  Prescriber OR PCP: Sole Ch CNP  Last diagnosis associated with med order: 1. Type 2 diabetes mellitus with hyperglycemia, without long-term current use of insulin (H)  - dulaglutide (TRULICITY) 1.5 mg/0.5 mL PnIj; Inject 1.5 mg under the skin every 7 days.  Dispense: 2 mL; Refill: 0    If protocol passes may refill for 6 months if within 3 months of last provider visit (or a total of 9 months).              Passed - A1C in last 6 months     Hemoglobin A1c   Date Value Ref Range Status   10/26/2020 9.7 (H) <=5.6 % Final     Comment:     Normal <5.7% Prediabete 5.7-6.4% Diabletes 6.5% or higher - adopted from ADA consensus guidelines               Passed - Microalbumin in last year     Microalbumin, Random Urine   Date Value Ref Range Status   10/26/2020 1.96 0.00 - 1.99 mg/dL Final                  Passed - Blood pressure in last year     BP Readings from Last 1 Encounters:   10/26/20 120/70             Passed - Creatinine done in last year     Creatinine   Date Value Ref Range Status   10/26/2020 0.80 0.60 - 1.10 mg/dL Final

## 2021-06-13 NOTE — PROGRESS NOTES
Assessment and Plan:    1. Routine general medical examination at a health care facility  Discussed consuming a healthy diet and exercising.  Discussed importance of routine sunscreen.  Discussed adequate calcium and vitamin D intake.  Provided TdaP and influenza vaccines.  She believes she received the pneumococcal.  We will try to obtain records from her previous clinic.  She is due for mammogram and colonoscopy.  - Gynecologic Cytology (PAP Smear)  - Tdap vaccine greater than or equal to 6yo IM  - Influenza, Seasonal,Quad Inj, 36+ MOS  - Mammo Screening Bilateral; Future    2. Hyperlipidemia, unspecified hyperlipidemia type  She continues pravastatin and fenofibrate.  She is not fasting today.  Anticipate recheck at next diabetes check.    3. Type 2 diabetes mellitus  This is well controlled.  Will notify patient of results.  She continues metformin 500 mg twice daily. Recommend diabetes recheck in six months.  - Glycosylated Hemoglobin A1c    4. Edema, unspecified type  She continues hydrochlorothiazide daily.    5. Colon cancer screening  - Ambulatory referral for Colonoscopy    6. Class 1 obesity due to excess calories with serious comorbidity and body mass index (BMI) of 33.0 to 33.9 in adult  The following high BMI interventions were performed this visit: exercise promotion: strength training, exercise promotion: stretching and nutrition therapy    7. Acute non-recurrent maxillary sinusitis  Will treat with Augmentin.  Educated on its indications and side effects. Discussed symptomatic treatment including OTC nasal saline sprays.  If no improvement with symptoms, the patient is to follow-up.  The patient is content with the plan.   - amoxicillin-clavulanate (AUGMENTIN) 875-125 mg per tablet; Take 1 tablet by mouth 2 (two) times a day for 10 days.  Dispense: 20 tablet; Refill: 0      Subjective:     Josefina is a 52 y.o. female presenting to the clinic for a female physical.     LMP: ablation six years ago   Hx  of abnormal pap smear: none   Last pap smear: six years ago   Perform self-breast exams: yes   Vaginal discharge or irritation:   Sexually active: yes,  for 11 years   Contraception: none   Concerns for STDs: none   Previous pregnancies:none     Patient moved to the area in July.  Previously lived in [place].  Patient has multiple comorbidities including type 2 diabetes, hyperlipidemia, allergies, lower extremity edema.  Patient states she was diagnosed with diabetes 2 years ago.  She does not check her blood sugars.  In the past when she checked them, her blood sugar fasting was 130.  She denies any sores on her feet.  Her last eye exam was 1 year ago.  She is taking metformin 500 mg twice daily.  Last hemoglobin A1c was 6.9% on 5/6/17.  Patient has hyperlipidemia.  She presents today with labs from her previous clinic.  Last cholesterol check was on 5/6/17 with a total cholesterol 221, triglycerides 205, HDL 43, .  She is taking hydrochlorothiazide for lower extremity edema.  CMP performed on 5/6/17 showed a glucose of 118 otherwise was unremarkable.  She had normal renal and hepatic function.    Patient has been experiencing cold symptoms for 2 weeks.  She complains of sinus congestion with purulent drainage, facial pain and pressure, nonproductive cough, postnasal drainage, halitosis.  She denies stomachache, nausea, vomiting, fever.  She has had intermittent frontal headaches.  She has not tried any over-the-counter products for symptoms.  She has multiple coworkers are ill.    Review of systems:  I performed a 10 point review of systems.  All pertinent positives and negatives are noted in the HPI. All others are negative.     No Known Allergies    No current outpatient prescriptions on file prior to visit.     No current facility-administered medications on file prior to visit.        Social History     Social History     Marital status:      Spouse name: N/A     Number of children: N/A      Years of education: N/A     Occupational History     Not on file.     Social History Main Topics     Smoking status: Never Smoker     Smokeless tobacco: Never Used     Alcohol use 0.6 oz/week     1 Glasses of wine per week     Drug use: No     Sexual activity: Yes     Partners: Male      Comment:      Other Topics Concern     Not on file     Social History Narrative     No narrative on file       Past Medical History:   Diagnosis Date     Diabetes mellitus      Environmental allergies      Hyperlipidemia        Family History   Problem Relation Age of Onset     Lung cancer Mother      Diabetes Mother      Heart disease Father      COPD Father      Dementia Father      Parkinsonism Father      Hyperlipidemia Father      Diabetes Maternal Aunt      Diabetes Maternal Grandmother      Diabetes Paternal Grandfather        Past Surgical History:   Procedure Laterality Date     ENDOMETRIAL ABLATION         Objective:     Vitals:    10/24/17 0727   BP: 118/60   Pulse: 84       Patient is alert, no obvious distress.   Skin: Warm, dry.  No rashes or lesions. Skin turgor rapid return.   HEENT:  Eyes normal.  Ears normal.  Nose patent, mucosa red.  Oropharynx mucosa pink, no lesions or tonsil enlargement.   Sinuses:  Tenderness to palpation of bilateral maxillary sinuses.   Neck:  Supple, without lymphadenopathy. Thyroid normal texture and size.    Lungs:  Clear to auscultation.  No wheezing, rales noted.  Respirations even and unlabored.   Heart:  Regular rate and rhythm.  No murmurs.   Breasts:  Normal.  No surrounding adenopathy.   Abdomen: Soft, nontender.  No organomegaly.  Bowel sounds normoactive.  No guarding or masses noted.   :  External genitalia normal.  Normal vaginal mucosa.  Cervix no lesions or cervical motion tenderness. Uterus normal size, no masses.  Adnexa no masses palpated bilaterally.   Musculoskeletal:  Full ROM of extremities.  Muscle strength equal +5/5.   Neurological:  Cranial nerves  2-12 intact.

## 2021-06-14 ENCOUNTER — COMMUNICATION - HEALTHEAST (OUTPATIENT)
Dept: EDUCATION SERVICES | Facility: CLINIC | Age: 56
End: 2021-06-14

## 2021-06-14 NOTE — TELEPHONE ENCOUNTER
Refill Approved    Rx renewed per Medication Renewal Policy. Medication was last renewed on 10/8/2020.    Lesvia Arriaga, [facility] Triage/Med Refill 1/5/2021     Requested Prescriptions   Pending Prescriptions Disp Refills     metFORMIN (GLUCOPHAGE) 500 MG tablet [Pharmacy Med Name: METFORMIN  MG TABLET] 360 tablet 0     Sig: TAKE 2 TABLETS BY MOUTH TWICE A DAY WITH FOOD       Metformin Refill Protocol Passed - 1/5/2021 12:24 AM        Passed - Blood pressure in last 12 months     BP Readings from Last 1 Encounters:   10/26/20 120/70             Passed - LFT or AST or ALT in last 12 months     Albumin   Date Value Ref Range Status   10/26/2020 4.1 3.5 - 5.0 g/dL Final     Bilirubin, Total   Date Value Ref Range Status   10/26/2020 0.3 0.0 - 1.0 mg/dL Final     Alkaline Phosphatase   Date Value Ref Range Status   10/26/2020 78 45 - 120 U/L Final     AST   Date Value Ref Range Status   10/26/2020 16 0 - 40 U/L Final     ALT   Date Value Ref Range Status   10/26/2020 25 0 - 45 U/L Final     Protein, Total   Date Value Ref Range Status   10/26/2020 7.3 6.0 - 8.0 g/dL Final                Passed - GFR or Serum Creatinine in last 6 months     GFR MDRD Non Af Amer   Date Value Ref Range Status   10/26/2020 >60 >60 mL/min/1.73m2 Final     GFR MDRD Af Amer   Date Value Ref Range Status   10/26/2020 >60 >60 mL/min/1.73m2 Final             Passed - Visit with PCP or prescribing provider visit in last 6 months or next 3 months     Last office visit with prescriber/PCP: 10/26/2020 OR same dept: 10/26/2020 Sole Ch CNP OR same specialty: 10/26/2020 Sole Ch CNP Last physical: Visit date not found Last MTM visit: Visit date not found         Next appt within 3 mo: Visit date not found  Next physical within 3 mo: Visit date not found  Prescriber OR PCP: Sole Ch CNP  Last diagnosis associated with med order: 1. Type 2 diabetes mellitus with hyperglycemia, without long-term current use of  insulin (H)  - metFORMIN (GLUCOPHAGE) 500 MG tablet [Pharmacy Med Name: METFORMIN  MG TABLET]; TAKE 2 TABLETS BY MOUTH TWICE A DAY WITH FOOD  Dispense: 360 tablet; Refill: 0    2. Mixed hyperlipidemia  - fenofibrate (TRICOR) 145 MG tablet [Pharmacy Med Name: FENOFIBRATE 145 MG TABLET]; TAKE 1 TABLET BY MOUTH EVERY DAY  Dispense: 90 tablet; Refill: 0     If protocol passes may refill for 12 months if within 3 months of last provider visit (or a total of 15 months).           Passed - A1C in last 6 months     Hemoglobin A1c   Date Value Ref Range Status   10/26/2020 9.7 (H) <=5.6 % Final     Comment:     Normal <5.7% Prediabete 5.7-6.4% Diabletes 6.5% or higher - adopted from ADA consensus guidelines               Passed - Microalbumin in last year      Microalbumin, Random Urine   Date Value Ref Range Status   10/26/2020 1.96 0.00 - 1.99 mg/dL Final                     fenofibrate (TRICOR) 145 MG tablet [Pharmacy Med Name: FENOFIBRATE 145 MG TABLET] 90 tablet 0     Sig: TAKE 1 TABLET BY MOUTH EVERY DAY       Fenofibrate Refill Protocol Passed - 1/5/2021 12:24 AM        Passed - Renal status in last 6 months     Creatinine   Date Value Ref Range Status   10/26/2020 0.80 0.60 - 1.10 mg/dL Final             Passed - Fasting lipid cascade in last 12 months     Cholesterol   Date Value Ref Range Status   10/26/2020 180 <=199 mg/dL Final     Triglycerides   Date Value Ref Range Status   10/26/2020 163 (H) <=149 mg/dL Final     HDL Cholesterol   Date Value Ref Range Status   10/26/2020 41 (L) >=50 mg/dL Final     LDL Calculated   Date Value Ref Range Status   10/26/2020 106 <=129 mg/dL Final     Patient Fasting > 8hrs?   Date Value Ref Range Status   10/26/2020 Yes  Final             Passed - AST or ALT in last 12 months     AST   Date Value Ref Range Status   10/26/2020 16 0 - 40 U/L Final     ALT   Date Value Ref Range Status   10/26/2020 25 0 - 45 U/L Final               Passed - PCP or prescribing provider visit  in past 12 months       Last office visit with prescriber/PCP: 10/26/2020 Sole Ch CNP OR same dept: 10/26/2020 Sole Ch CNP OR same specialty: 10/26/2020 Sole Ch CNP  Last physical: 10/24/2017 Last MTM visit: Visit date not found   Next visit within 3 mo: Visit date not found  Next physical within 3 mo: Visit date not found  Prescriber OR PCP: Sole Ch CNP  Last diagnosis associated with med order: 1. Type 2 diabetes mellitus with hyperglycemia, without long-term current use of insulin (H)  - metFORMIN (GLUCOPHAGE) 500 MG tablet [Pharmacy Med Name: METFORMIN  MG TABLET]; TAKE 2 TABLETS BY MOUTH TWICE A DAY WITH FOOD  Dispense: 360 tablet; Refill: 0    2. Mixed hyperlipidemia  - fenofibrate (TRICOR) 145 MG tablet [Pharmacy Med Name: FENOFIBRATE 145 MG TABLET]; TAKE 1 TABLET BY MOUTH EVERY DAY  Dispense: 90 tablet; Refill: 0    If protocol passes may refill for 12 months if within 3 months of last provider visit (or a total of 15 months).

## 2021-06-14 NOTE — TELEPHONE ENCOUNTER
dulaglutide (TRULICITY) 0.75 mg/0.5 mL Armando [[id number]]    Electronically signed by: Sole Ch CNP on 04/20/20 0845 Status: Discontinued   Ordering user: Sole Ch CNP 04/20/20 0845 Authorized by: Sole Ch CNP   Frequency: Q7 Days 04/20/20 - 10/02/20  Discontinued by: Shayna Wood MD 10/02/20 1156   Diagnoses   Type 2 diabetes mellitus with hyperglycemia, without long-term current use of insulin (H) [E11.65]

## 2021-06-14 NOTE — TELEPHONE ENCOUNTER
Refill Approved    Rx renewed per Medication Renewal Policy. Medication was last renewed on 4/20/20.    Marine Moon, [facility] Triage/Med Refill 1/31/2021     Requested Prescriptions   Pending Prescriptions Disp Refills     hydroCHLOROthiazide (HYDRODIURIL) 25 MG tablet [Pharmacy Med Name: HYDROCHLOROTHIAZIDE 25 MG TAB] 90 tablet 2     Sig: TAKE 1 TABLET BY MOUTH EVERY DAY       Diuretics/Combination Diuretics Refill Protocol  Passed - 1/29/2021  9:07 AM        Passed - Visit with PCP or prescribing provider visit in past 12 months     Last office visit with prescriber/PCP: 10/26/2020 Sole Ch CNP OR same dept: 10/26/2020 Sole Ch CNP OR same specialty: 10/26/2020 Sole Ch CNP  Last physical: 10/24/2017 Last MTM visit: Visit date not found   Next visit within 3 mo: Visit date not found  Next physical within 3 mo: Visit date not found  Prescriber OR PCP: Sole Ch CNP  Last diagnosis associated with med order: 1. Edema, unspecified type  - hydroCHLOROthiazide (HYDRODIURIL) 25 MG tablet [Pharmacy Med Name: HYDROCHLOROTHIAZIDE 25 MG TAB]; TAKE 1 TABLET BY MOUTH EVERY DAY  Dispense: 90 tablet; Refill: 2    If protocol passes may refill for 12 months if within 3 months of last provider visit (or a total of 15 months).             Passed - Serum Potassium in past 12 months      Lab Results   Component Value Date    Potassium 4.3 10/26/2020             Passed - Serum Sodium in past 12 months      Lab Results   Component Value Date    Sodium 139 10/26/2020             Passed - Blood pressure on file in past 12 months     BP Readings from Last 1 Encounters:   10/26/20 120/70             Passed - Serum Creatinine in past 12 months      Creatinine   Date Value Ref Range Status   10/26/2020 0.80 0.60 - 1.10 mg/dL Final

## 2021-06-16 PROBLEM — K42.0 INCARCERATED UMBILICAL HERNIA: Status: ACTIVE | Noted: 2019-12-17

## 2021-06-16 PROBLEM — R60.9 EDEMA, UNSPECIFIED TYPE: Status: ACTIVE | Noted: 2017-10-24

## 2021-06-16 PROBLEM — E78.5 HYPERLIPIDEMIA, UNSPECIFIED HYPERLIPIDEMIA TYPE: Status: ACTIVE | Noted: 2017-10-24

## 2021-06-16 PROBLEM — E66.01 MORBID OBESITY (H): Status: ACTIVE | Noted: 2018-08-23

## 2021-06-16 PROBLEM — E11.9 TYPE 2 DIABETES MELLITUS (H): Status: ACTIVE | Noted: 2017-10-24

## 2021-06-16 NOTE — PROGRESS NOTES
Assessment and Plan:     1. Pain of right thigh  Suspect muscular strain.  Other differentials include DVT or electrolyte imbalance.  Will obtain d-dimer to rule out DVT.  Discussed symptomatic treatment including rest, ice, and ibuprofen.  If no improvement symptoms, she is to follow-up.  She is content with the plan.  - D-dimer, Quantitative  - Basic Metabolic Panel; Future  - Magnesium  - Basic Metabolic Panel      Subjective:     Josefina is a 52 y.o. female presenting to the clinic for concerns for right anterior thigh pain.  Patient has been exercising at [employer].  She has been performing various fitness classes.  Patient has been experiencing a tightness within her right anterior thigh for 1-1/2 weeks.  She describes the pain as an intermittent burning sensation that occurs when she stands for prolonged periods or walks up and down stairs.  Her symptoms worsened on Saturday.  She denies any specific injury.  She has not traveled recently.  She denies any personal or family history of blood clots.  She has not noticed any redness, swelling, or bruising.  She is starting to feel some pain within her right hip.  She has been taking Advil as needed.  She does have type 2 diabetes which is controlled.     Review of Systems: A complete 14 point review of systems was obtained and is negative or as stated in the history of present illness.    Social History     Social History     Marital status:      Spouse name: N/A     Number of children: N/A     Years of education: N/A     Occupational History     Not on file.     Social History Main Topics     Smoking status: Never Smoker     Smokeless tobacco: Never Used     Alcohol use 0.6 oz/week     1 Glasses of wine per week     Drug use: No     Sexual activity: Yes     Partners: Male      Comment:      Other Topics Concern     Not on file     Social History Narrative       Active Ambulatory Problems     Diagnosis Date Noted     Type 2 diabetes mellitus  "10/24/2017     Hyperlipidemia, unspecified hyperlipidemia type 10/24/2017     Edema, unspecified type 10/24/2017     Class 1 obesity due to excess calories with serious comorbidity and body mass index (BMI) of 33.0 to 33.9 in adult 10/24/2017     Resolved Ambulatory Problems     Diagnosis Date Noted     No Resolved Ambulatory Problems     Past Medical History:   Diagnosis Date     Diabetes mellitus      Environmental allergies      Hyperlipidemia        Family History   Problem Relation Age of Onset     Lung cancer Mother      Diabetes Mother      Heart disease Father      COPD Father      Dementia Father      Parkinsonism Father      Hyperlipidemia Father      Diabetes Maternal Aunt      Diabetes Maternal Grandmother      Diabetes Paternal Grandfather        Objective:     /78  Pulse 92  Ht 5' 2.5\" (1.588 m)  Wt 191 lb 4.8 oz (86.8 kg)  BMI 34.43 kg/m2    Patient is alert, in no obvious distress.   Skin: Warm, dry.   Lungs:  Clear to auscultation. Respirations even and unlabored.  No wheezing or rales noted.   Heart:  Regular rate and rhythm.  No murmurs.   Musculoskeletal: She is tender to palpation of her mid anterior thigh and her IT band.  There are no areas of redness, swelling, bruising.  She ambulates without difficulty.  She does complain of pain with performing a squat.  She is nontender to palpation of her hamstring or calf muscles.  She has full range of motion of her right knee and hip.            "

## 2021-06-17 NOTE — TELEPHONE ENCOUNTER
Reason contacted:  Test results within this encounter   Information relayed:  [facility] notes below   Additional questions:  No  Further follow-up needed:  No  Okay to leave a detailed message:  No

## 2021-06-17 NOTE — TELEPHONE ENCOUNTER
Tuan Beatty.  Can you review this?  Any suggestions?  I guess the pharmacy is also questioning the prescription.  Thanks!

## 2021-06-17 NOTE — TELEPHONE ENCOUNTER
[facility] PA team  [phone]  Pool: HE PA MED ([id number])          PA has been initiated.       PA form completed and faxed insurance via Cover My Meds     Key:  [id number]     Medication:  TRULICITY 1.5MG    Insurance:  [employer] [place]        Response will be received via fax and may take up to 5-10 business days depending on plan

## 2021-06-17 NOTE — TELEPHONE ENCOUNTER
Called [facility] pharmacy [phone] and talked to technician who confirmed they have a paid claim through insurance for Trulicity 3mg/0.5ml and are in the process of filling this now.

## 2021-06-17 NOTE — TELEPHONE ENCOUNTER
Please initiate PA for Trulicity 1.5 mg/0.5 mL inject 3 mg under the skin every 7 days per 5/19/2021 [employer] message.

## 2021-06-17 NOTE — TELEPHONE ENCOUNTER
I sent in the 3 mg pens -- can you call to make sure it is covered? Hopefully this one will work. Thanks!

## 2021-06-17 NOTE — TELEPHONE ENCOUNTER
----- Message from Sole Ch CNP sent at 5/17/2021  8:46 AM CDT -----  Please notify the patient that her A1c is elevated.  I spoke to our pharmacist.  We would like her to increase her Trulicity to 3 mg weekly.  If she has an even number of Trulicity 1.5 mg at home, she can inject the 1.5 mg twice at least 2 inches apart (to equal the 3 mg).  I recommend a diabetes recheck in 3 months.  Thanks.

## 2021-06-17 NOTE — TELEPHONE ENCOUNTER
Left message to call back for: pt  Information to relay to patient:  Left message to call and schedule ofv with Sole Ch re: diabetes check.

## 2021-06-17 NOTE — TELEPHONE ENCOUNTER
Please advise insurance called inquiring about dosing instructions for Trulicity 1.5mg/0.5ml     Rx states patient is to inject 3mg every 7 days.  The dosing for this strength typically is 1.5mg every 7 days.  They are asking if patient is to be using 3mg every 7 days.     Previous Rx's in patient's chart shows 1.5mg dosing.  Clinic notes do not indicate that patient is to be titrating to 3mg dosing.     Is provider still wanting patient to inject 3mg every 7 days?  If so please provide reasoning for increase in dosing for insurance and route back to [facility] PA team to complete PA.     If this is an error please send new Rx to pharmacy with the 1.5mg dosing instructions. No PA should be needed for the standard weekly 1.5mg dosing.

## 2021-06-17 NOTE — TELEPHONE ENCOUNTER
Fax from [facility] pharmacy stating insurance wants a prior authorization for this dosage (Trulicity 1.5 mg/0.5 ml pen). This is not the recommended dose per manufactory.

## 2021-06-17 NOTE — PROGRESS NOTES
Assessment and Plan:     1. Type 2 diabetes mellitus with hyperglycemia, without long-term current use of insulin (H)  Patient's fasting blood sugars have been elevated.  She may benefit from a continuous glucose monitor.  Will refer to diabetes education.  We will check A1c today.  She continues to Trulicity and Metformin.  - Glycosylated Hemoglobin A1c  - Ambulatory referral to Ophthalmology - [facility]  - Ambulatory referral to Diabetes Education Program (CDE)    2. Hyperlipidemia, unspecified hyperlipidemia type  Goal LDL is less than 100.  She continues pravastatin and fenofibrate.    3. Morbid obesity (H)  Discussed weight loss goals.    4. Allergic rhinitis, unspecified seasonality, unspecified trigger  She continues cetirizine.    5. Edema, unspecified type  She continues hydrochlorothiazide.     The patient is content with the plan and will follow-up in 3-6 months for medication management or sooner with any further concerns.       Subjective:     Josefina is a 55 y.o. female presenting to the clinic for medication management.  Patient has type 2 diabetes and is injecting Trulicity and taking Metformin.  She is tolerating the medication well without any side effects.  Patient has been checking her blood sugars and they typically range 180-200 fasting.  She is trying to consume healthy diet and walks minimally for exercise.  She denies having sores on her feet.  Her last eye exam was over 1 year ago.  Patient has hyperlipidemia is taking pravastatin and fenofibrate.  She is taking loratadine for allergies.  She is taking hydrochlorothiazide for lower extremity edema which is controlled.  Overall, she feels well today and has no other concerns.    Review of Systems: A complete 14 point review of systems was obtained and is negative or as stated in the history of present illness.    Social History     Socioeconomic History     Marital status:      Spouse name: Not on file     Number of  children: Not on file     Years of education: Not on file     Highest education level: Not on file   Occupational History     Not on file   Social Needs     Financial resource strain: Not on file     Food insecurity     Worry: Not on file     Inability: Not on file     Transportation needs     Medical: Not on file     Non-medical: Not on file   Tobacco Use     Smoking status: Never Smoker     Smokeless tobacco: Never Used   Substance and Sexual Activity     Alcohol use: Yes     Alcohol/week: 1.0 standard drinks     Types: 1 Glasses of wine per week     Drug use: No     Sexual activity: Yes     Partners: Male     Comment:    Lifestyle     Physical activity     Days per week: Not on file     Minutes per session: Not on file     Stress: Not on file   Relationships     Social connections     Talks on phone: Not on file     Gets together: Not on file     Attends [employer] service: Not on file     Active member of club or organization: Not on file     Attends meetings of clubs or organizations: Not on file     Relationship status: Not on file     Intimate partner violence     Fear of current or ex partner: Not on file     Emotionally abused: Not on file     Physically abused: Not on file     Forced sexual activity: Not on file   Other Topics Concern     Not on file   Social History Narrative     Not on file       Active Ambulatory Problems     Diagnosis Date Noted     Type 2 diabetes mellitus (H) 10/24/2017     Hyperlipidemia, unspecified hyperlipidemia type 10/24/2017     Edema, unspecified type 10/24/2017     Morbid obesity (H) 08/23/2018     Incarcerated umbilical hernia 12/17/2019     Resolved Ambulatory Problems     Diagnosis Date Noted     Class 1 obesity due to excess calories with serious comorbidity and body mass index (BMI) of 33.0 to 33.9 in adult 10/24/2017     Past Medical History:   Diagnosis Date     Diabetes mellitus (H)      Environmental allergies      Hyperlipidemia        Family History    Problem Relation Age of Onset     Lung cancer Mother      Diabetes Mother      Heart disease Father      COPD Father      Dementia Father      Parkinsonism Father      Hyperlipidemia Father      Diabetes Maternal Aunt      Diabetes Maternal Grandmother      Diabetes Paternal Grandfather        Objective:     /60 (Patient Site: Right Arm, Patient Position: Sitting, Cuff Size: Adult Large)   Pulse 80   Wt 193 lb 4.8 oz (87.7 kg)   BMI 36.52 kg/m      Patient is alert, in no obvious distress.   Skin: Warm, dry.    Neck: Supple, no lymphadenopathy.  No thyromegaly.  Lungs:  Clear to auscultation. Respirations even and unlabored.  No wheezing or rales noted.   Heart:  Regular rate and rhythm.  No murmurs, S3, S4, gallops, or rubs.    Musculoskeletal: Monofilament test is normal bilaterally.

## 2021-06-17 NOTE — PATIENT INSTRUCTIONS - HE
Patient Instructions by Get Pacheco MD at 11/3/2019  1:40 PM     Author: Get Pacheco MD Service: -- Author Type: Resident    Filed: 11/3/2019  2:27 PM Encounter Date: 11/3/2019 Status: Signed    : Get Pacheco MD (Resident)       Patient Education     Viral Bronchitis (Adult)    You have a viral bronchitis. Bronchitis is inflammation and swelling of the lining of the lungs. This is often caused by an infection. Symptoms include a dry, hacking cough that is worse at night. The cough may bring up yellow-green mucus. You may also feel short of breath or wheeze. Other symptoms may include tiredness, chest discomfort, and chills.  Bronchitis that is caused by a virus is not treated with antibiotics. Instead, medicines may be given to help relieve symptoms. Symptoms can last up to 2 weeks, although the cough may last much longer.  This illness is contagious during the first few days and is spread through the air by coughing and sneezing, or by direct contact (touching the sick person and then touching your own eyes, nose, or mouth).  Most viral illnesses resolve within 10 to 14 days with rest and simple home remedies, although they may sometimes last for several weeks.  Home care    If symptoms are severe, rest at home for the first 2 to 3 days. When you go back to your usual activities, don't let yourself get too tired.    Do not smoke. Also avoid being exposed to secondhand smoke.    You may use over-the-counter medicine to control fever or pain, unless another pain medicine was prescribed. If you have chronic liver or kidney disease or have ever had a stomach ulcer or gastrointestinal bleeding, talk with your healthcare provider before using these medicines. Also talk to your provider if you are taking medicine to prevent blood clots. Aspirin should never be given to anyone younger than 18 years of age who is ill with a viral infection or fever. It may cause severe liver or brain damage.    Your  appetite may be poor, so a light diet is fine. Avoid dehydration by drinking 6 to 8 glasses of fluids per day (such as water, soft drinks, sports drinks, juices, tea, or soup). Extra fluids will help loosen secretions in the nose and lungs.    Over-the-counter cough, cold, and sore-throat medicines will not shorten the length of the illness, but they may help to reduce symptoms. Don't use decongestants if you have high blood pressure.  Follow-up care  Follow up with your healthcare provider, or as advised. If you had an X-ray or ECG (electrocardiogram), a specialist will review it. You will be notified of any new findings that may affect your care.  If you are age 65 or older, or if you have a chronic lung disease or condition that affects your immune system, or you smoke, ask your healthcare provider about getting a pneumococcal vaccine and a yearly flu shot (influenza vaccine).  When to seek medical advice  Call your healthcare provider right away if any of these occur:    Fever of 100.4 F (38 C) or higher, or as directed by your healthcare provider    Coughing up increased amounts of colored sputum    Weakness, drowsiness, headache, facial pain, ear pain, or a stiff neck  Call 911  Call 911 if any of these occur:    Coughing up blood    Worsening weakness, drowsiness, headache, or stiff neck    Trouble breathing, wheezing, or pain with breathing  Date Last Reviewed: 9/13/2015 2000-2017 [employer]. [address] [place] [id number]. All rights reserved. This information is not intended as a substitute for professional medical care. Always follow your healthcare professional's instructions.

## 2021-06-17 NOTE — TELEPHONE ENCOUNTER
Telephone Encounter by Damaris Sawyer at 5/20/2021 11:00 AM     Author: Damaris Swayer Service: -- Author Type: --    Filed: 5/20/2021 11:02 AM Encounter Date: 5/19/2021 Status: Signed    : Damaris Sawyer       Previously denied, please see telephone encounter from 5/17/2021 for complete denial reasoning and appeal information.     Patient is able to get up to 4 pens per 28 days.   Per insurance dose can be made with a higher strength.  Trulicity is available in 3mg/0.5ml pen injectors.

## 2021-06-17 NOTE — TELEPHONE ENCOUNTER
Telephone Encounter by Damaris Sawyer at 5/20/2021 10:56 AM     Author: Damaris Sawyer Service: -- Author Type: --    Filed: 5/20/2021 10:58 AM Encounter Date: 5/17/2021 Status: Signed    : Damaris Sawyer       PRIOR AUTHORIZATION DENIED    Denial Rational: Patient is able to get up to 4 pens per 28 days.   Per insurance dose can be made with a higher strength.  Trulicity is available in 3mg/0.5ml pen injectors.          Appeal Information: This medication was denied. If physician would like to appeal because patient has contraindication or allergy to covered medication please write letter of medical necessity and route back to PA team to initiate.  If no further action is needed please close encounter thank you.

## 2021-06-17 NOTE — TELEPHONE ENCOUNTER
RN cannot approve Refill Request    RN can NOT refill this medication PCP messaged that patient is overdue for Office Visit. Last office visit: 10/26/2020 Sole Ch CNP Last Physical: 10/24/2017 Last MTM visit: Visit date not found Last visit same specialty: 10/26/2020 Sole Ch CNP.  Next visit within 3 mo: Visit date not found  Next physical within 3 mo: Visit date not found      May Pascual, [facility] Triage/Med Refill 5/4/2021    Requested Prescriptions   Pending Prescriptions Disp Refills     TRULICITY 1.5 mg/0.5 mL PnIj [Pharmacy Med Name: TRULICITY 1.5 MG/0.5 ML PEN] 2 Syringe 4     Sig: INJECT 1.5 MG UNDER THE SKIN EVERY 7 DAYS.       Insulin/GLP-1 Refill Protocol Failed - 5/4/2021  9:29 AM        Failed - Visit with PCP or prescribing provider visit in last 6 months     Last office visit with prescriber/PCP: Visit date not found OR same dept: 10/26/2020 Sole Ch CNP OR same specialty: 10/26/2020 Sole Ch CNP Last physical: Visit date not found Last MTM visit: Visit date not found     Next appt within 3 mo: Visit date not found  Next physical within 3 mo: Visit date not found  Prescriber OR PCP: Sole Ch CNP  Last diagnosis associated with med order: 1. Type 2 diabetes mellitus with hyperglycemia, without long-term current use of insulin (H)  - TRULICITY 1.5 mg/0.5 mL PnIj [Pharmacy Med Name: TRULICITY 1.5 MG/0.5 ML PEN]; Inject 1.5 mg under the skin every 7 days.  Dispense: 2 Syringe; Refill: 4    If protocol passes may refill for 6 months if within 3 months of last provider visit (or a total of 9 months).              Passed - A1C in last 6 months     Hemoglobin A1c   Date Value Ref Range Status   02/12/2021 8.4 (H) <=5.6 % Final               Passed - Microalbumin in last year     Microalbumin, Random Urine   Date Value Ref Range Status   10/26/2020 1.96 0.00 - 1.99 mg/dL Final                  Passed - Blood pressure in last year     BP Readings from Last 1  Encounters:   10/26/20 120/70             Passed - Creatinine done in last year     Creatinine   Date Value Ref Range Status   10/26/2020 0.80 0.60 - 1.10 mg/dL Final

## 2021-06-19 NOTE — LETTER
Letter by Sole Ch CNP at      Author: Sole Ch CNP Service: -- Author Type: --    Filed:  Encounter Date: 3/21/2019 Status: (Other)         Josefina Damico  [address] [address]             March 21, 2019         Dear MsYanci Margaux,    Below are the results from your recent visit:    Resulted Orders   Glycosylated Hemoglobin A1c   Result Value Ref Range    Hemoglobin A1c 6.7 (H) 4.2 - 6.1 %       Your A1C is great. Keep up the good work!  I recommend continuing your current diabetes medication regimen.      Please call with questions or contact us using [employer].    Sincerely,        Electronically signed by Sole Ch CNP

## 2021-06-19 NOTE — LETTER
Letter by Sole Ch CNP at      Author: Sole Ch CNP Service: -- Author Type: --    Filed:  Encounter Date: 8/7/2019 Status: (Other)         Josefina Damico  [address] [address]             August 7, 2019         Dear Ms. Damico,    Below are the results from your recent visit:    Resulted Orders   Glycosylated Hemoglobin A1c   Result Value Ref Range    Hemoglobin A1c 7.2 (H) 3.5 - 6.0 %   Lipid Cascade   Result Value Ref Range    Cholesterol 194 <=199 mg/dL    Triglycerides 137 <=149 mg/dL    HDL Cholesterol 48 (L) >=50 mg/dL    LDL Calculated 119 <=129 mg/dL    Patient Fasting > 8hrs? Yes    Comprehensive Metabolic Panel   Result Value Ref Range    Sodium 139 136 - 145 mmol/L    Potassium 4.6 3.5 - 5.0 mmol/L    Chloride 104 98 - 107 mmol/L    CO2 26 22 - 31 mmol/L    Anion Gap, Calculation 9 5 - 18 mmol/L    Glucose 131 (H) 70 - 125 mg/dL    BUN 20 8 - 22 mg/dL    Creatinine 0.76 0.60 - 1.10 mg/dL    GFR MDRD Af Amer >60 >60 mL/min/1.73m2    GFR MDRD Non Af Amer >60 >60 mL/min/1.73m2    Bilirubin, Total 0.3 0.0 - 1.0 mg/dL    Calcium 10.5 8.5 - 10.5 mg/dL    Protein, Total 7.4 6.0 - 8.0 g/dL    Albumin 4.0 3.5 - 5.0 g/dL    Alkaline Phosphatase 72 45 - 120 U/L    AST 15 0 - 40 U/L    ALT 21 0 - 45 U/L    Narrative    Fasting Glucose reference range is 70-99 mg/dL per  American Diabetes Association (ADA) guidelines.       Your A1C has increased.  Let's recheck this in 3 months since we increased the Metformin recently.     Secondly, your LDL (bad cholesterol) is not at goal.  For diabetes, our goal is less than 100.  You are already taking the highest dose of Pravastatin.  We can try a more potent cholesterol medication if you are interested.  Please let me know. Otherwise,  I recommend you consume a healthy low-fat diet (avoid fast food, fried foods, processed foods, and butter) and exercise.   You can also consider taking over-the-counter fish oil or CoQ10 to assist with this.   "Let's recheck this in 3 months.        You can lower your cholesterol some if you avoid red meat, butter, fried foods, cheese, and other foods that have a lot of saturated fat. Other things that might help lower cholesterol include:  ?Eating more soluble fiber - Soluble fiber is found in fruits, oats, barley, beans, and peas.  ?A vegan diet - A vegan diet contains no animal products, such as meat, eggs, or milk.  But if you are interested in improving your health, it's best not to focus just on cholesterol. There are changes you can make to your diet that will reduce your risk of heart disease and other problems--even if they don't lower your cholesterol much.  No single diet is right for everyone. But in general, a healthy diet can include:  ?Lots of fruits, vegetables, and whole grains (examples of whole grains include whole wheat, oats, and barley)  ?Some beans, peas, lentils, chickpeas, and similar foods  ?Some nuts, such as walnuts, almonds, and peanuts  ?Some milk and milk products  ?Some fish  To have a healthy diet, it's also important to limit or avoid sugar, sweets, and refined grains. (Refined grains are found in white bread, white rice, most forms of pasta, and most packaged \"snack\" foods.)        Please call with questions or contact us using [employer].    Sincerely,        Electronically signed by Sole Ch CNP       "

## 2021-06-20 ENCOUNTER — HEALTH MAINTENANCE LETTER (OUTPATIENT)
Age: 56
End: 2021-06-20

## 2021-06-20 NOTE — PROGRESS NOTES
Chief complaint: Allergies    History of present illness: This is a pleasant 53-year-old woman who is here today for evaluation of allergies.  She states that she lived in [place] for 27 years.  She grew up here in [place] but lived there and lived there for 27 years.  When she was there she developed significant allergies.  She was tested and positive to multiple aeroallergens needed [place] as well as her cats.  She has 4.  She is not willing to remove her cats from her home but states she was able to control symptoms with Claritin-D.  She recently moved back here to [place] last July.  She reports since she moved back her allergy symptoms have increased.  She has itchy eyes, sneezing and drainage in the back of her throat.  She continues on Claritin-D regularly but notes that her primary care physician would like her to be off of the decongestant.  She does not use nasal sprays or rinses.  She has no history of asthma but she states that she does get bronchitis 1-2 times per year.  Presents of cough and some wheezing.  She has been given inhalers for some of these episodes of bronchitis.    Past medical history: Diabetes, varicose vein, hyperlipidemia    Social history: She works as a [employer] , lives in a [facility], no exposure to mold, has carpeting in the home, air purifier but it does not seem to help symptoms, 4 cats, secondhand smoke exposure, non-smoker    Family history: Parents with allergies    Review of Systems performed as above and the remainder is negative.        Current Outpatient Prescriptions:      aspirin 81 MG EC tablet, Take 81 mg by mouth daily., Disp: , Rfl:      blood glucose test strips, Use 1 each As Directed as needed. Dispense brand per patient's insurance at pharmacy discretion. She has one touch ultra at home, Disp: 100 strip, Rfl: 2     cholecalciferol, vitamin D3, (VITAMIN D3) 5,000 unit Tab, Take by mouth., Disp: , Rfl:      fenofibrate (TRICOR) 145 MG tablet, Take 1  "tablet (145 mg total) by mouth daily., Disp: 90 tablet, Rfl: 2     hydroCHLOROthiazide (HYDRODIURIL) 25 MG tablet, Take 1 tablet (25 mg total) by mouth daily., Disp: 90 tablet, Rfl: 1     metFORMIN (GLUCOPHAGE) 500 MG tablet, TAKE 2 TABLET BY MOUTH TWICE DAILY WITH MEALS., Disp: 360 tablet, Rfl: 0     OMEGA-3/DHA/EPA/FISH OIL (FISH OIL-OMEGA-3 FATTY ACIDS) 300-1,000 mg capsule, Take 2 g by mouth daily., Disp: , Rfl:      pravastatin (PRAVACHOL) 80 MG tablet, Take 1 tablet (80 mg total) by mouth every evening., Disp: 90 tablet, Rfl: 0     fluticasone (FLONASE) 50 mcg/actuation nasal spray, 2 sprays into each nostril daily., Disp: 1 g, Rfl: 5     fluticasone (FLONASE) 50 mcg/actuation nasal spray, 2 sprays into each nostril daily., Disp: 1 g, Rfl: 3     loratadine-pseudoephedrine (CLARITIN-D 24-HOUR)  mg per 24 hr tablet, Take 1 tablet by mouth daily., Disp: 30 tablet, Rfl: 1    No Known Allergies    /72  Pulse 72  Ht 5' 1.5\" (1.562 m)  Wt 196 lb (88.9 kg)  BMI 36.43 kg/m2  Gen: Pleasant female not in acute distress  HEENT: Eyes no erythema of the bulbar or palpebral conjunctiva, no edema. Ears: TMs well visualized, no effusions. Nose: No congestion, mucosa normal. Mouth: Throat clear, no lip or tongue edema.   Cardiac: Regular rate and rhythm, no murmurs, rubs or gallops  Respiratory: Clear to auscultation bilaterally, no adventitious breath sounds  Lymph: No supraclavicular or cervical lymphadenopathy  Skin: No rashes or lesions  Psych: Alert and oriented times 3    Last Percutaneous Allergy Test Results  Trees  [place], White  1:20 H  (W/F in mm): 18-30 (09/06/18 0842)  Birch Mix 1:20 H (W/F in mm): 7-15 (09/06/18 0842)  [place], Common 1:20 H (W/F in mm): 0-0 (09/06/18 0842)  Gabrielle Carranza 1:20 H (W/F in mm): 0-0 (09/06/18 0842)  Johann Muñoz 1:20 H (W/F in mm): 3-f (09/06/18 0842)  Maple, Hard/Sugar 1:20 H (W/F in mm): 0-0 (09/06/18 0842)  [place]oman Mix 1:20 H (W/F in mm): 0-0 (09/06/18 " 0842)  [place], Red 1:20 H (W/F in mm): 3-f (09/06/18 0842)  [place], American 1:20 H (W/F in mm): 0-0 (09/06/18 0842)  [place] Tree 1:20 H (W/F in mm): 3-f (09/06/18 0842)  Dust Mites  D. Pteronyssinus Mite 30,000 AU/ML H (W/F in mm): 0-0 (09/06/18 0842)  D. Farinae Mite 30,000 AU/ML H (W/F in mm: 3-f (09/06/18 0842)  Grasses  Grass Mix #4 10,000 BAU/ML H: 0-0 (09/06/18 0842)  [place]an Grass 1:20 H (W/F in mm): 0-0 (09/06/18 0842)  Cockroach  Cockroach Mix 1:10 H (W/F in mm): 0-0 (09/06/18 0842)  Molds/Fungi  Alternaria Tenuis 1:10 H (W/F in mm): 3-f (09/06/18 0842)  Aspergillus Fumigatus 1:10 H (W/F in mm): 0-0 (09/06/18 0842)  Homodendrum Cladosporioides 1:10 H (W/F in mm): 0-0 (09/06/18 0842)  Penicillin Notatum 1:10 H (W/F in mm): 0-0 (09/06/18 0842)  Epicoccum 1:10 H (W/F in mm): 0-0 (09/06/18 0842)  Weeds  Ragweed, Short 1:20 H (W/F in mm): 0-0 (09/06/18 0842)  Dock, Sorrel 1:20 H (W/F in mm): 0-0 (09/06/18 0842)  Lamb's Quarter 1:20 H (W/F in mm): 0-0 (09/06/18 0842)  Pigweed, Rough Red Root 1:20 H  (W/F in mm): 0-0 (09/06/18 0842)  Plantain, English 1:20 H  (W/F in mm): 0-0 (09/06/18 0842)  Sagebrush, Mugwort 1:20 H  (W/F in mm): 7-15 (09/06/18 0842)  Animal  Cat 10,000 BAU/ML H (W/F in mm): 4-15 (09/06/18 0842)  Dog 1:10 H (W/F in mm): 0-0 (09/06/18 0842)  Controls  Device Type: QUINTIP (09/06/18 0842)  Neg. control: 50% Glycerine/Saline H (W/F in mm): 0-0 (09/06/18 0842)  Pos. control: Histamine 6mg/ML (W/F in mms): 5-f (09/06/18 0842)    Impression report and plan:    1.  Allergic rhinitis    I went over environmental control.  Recommended continuing Claritin but discontinued the decongestion.  Add fluticasone nasal spray 1 spray each nostril twice daily.  Consider nasal rinses.  Could add montelukast as well.  I did discuss with her allergy shots.  I went over the risks and benefits of allergy shots.  I stated risks include hives, swelling, shortness of breath.  I did state that one in 2.5 million shot  administrations can result in death.  I stated they must wait in the office for 30 minutes following the shot and carry an epinephrine device on the day of the shot.  I stated that shots are effective in about 90% of patients.  I stated that they should check with the insurance company prior to proceeding.  They understand the risks and benefits and will let me know if she would like to proceed.    Time spent with patient, 45 minutes, greater than half spent counseling and coordination of care regarding allergies.

## 2021-06-20 NOTE — PROGRESS NOTES
Assessment and Plan:     1. Type 2 diabetes mellitus (H)  Will check A1C and notify the patient of the results.  She continues metformin 500 mg twice daily.  If A1c is greater than 7%, will increase metformin.  - Glycosylated Hemoglobin A1c  - blood glucose test strips; Use 1 each As Directed as needed. Dispense brand per patient's insurance at pharmacy discretion. She has one touch ultra at home  Dispense: 100 strip; Refill: 2  - Microalbumin, Random Urine    2. Edema, unspecified type  She continues hydrochlorothiazide as needed.  Blood pressure is well controlled.  - hydroCHLOROthiazide (HYDRODIURIL) 25 MG tablet; Take 1 tablet (25 mg total) by mouth daily.  Dispense: 90 tablet; Refill: 1    3. Hyperlipidemia, unspecified hyperlipidemia type  Will check lipid cascade and adjust pravastatin accordingly.  - pravastatin (PRAVACHOL) 40 MG tablet; Take 1 tablet (40 mg total) by mouth at bedtime.  Dispense: 90 tablet; Refill: 2  - Lipid Cascade    4. Chronic allergic rhinitis due to animal hair and dander  Patient takes Claritin-D on a daily basis.  I explained that I am uncomfortable continuing this on a daily basis.  I would like her to see an allergist to discuss other treatment options.  - Ambulatory referral to Allergy    5. It band syndrome, right  Discussed symptomatic treatment including rest ice, foam rolling, stretching.  Will refer to physical therapy.  - Ambulatory referral to Physical Therapy    6. Morbid obesity (H)  The following high BMI interventions were performed this visit: dietary needs education, exercise promotion: strength training and exercise promotion: stretching    7. Medication management  - Comprehensive Metabolic Panel    8.  Dermatofibroma.   Discussed benign findings.  Offered referral to dermatology for excision if she is interested, but she declines.  She will continue to monitor.  Discussed symptoms of skin cancer and when to follow-up.  She is content with the plan.      Subjective:      Josefina is a 53 y.o. female presenting to the clinic for medication management and other concerns.  Patient has type 2 diabetes.  Last hemoglobin A1c was 6.5% on 10/24/17.  She is currently taking metformin but states she has been noncompliant this summer.  Her niece has been staying with her so she has not been eating as healthy.  She has hyperlipidemia and takes fenofibrate.  I discussed today changing her to a statin if her triglycerides are not significantly elevated.  She will also plan on starting a daily aspirin.  She does not check her blood sugars.  Her last eye exam was within the past year.  She denies any sores on her feet.  She denies chest pain, shortness of breath with exertion, edema, orthopnea, syncope.  She takes hydrochlorothiazide for lower extremity edema.  Patient was evaluated in October 2017 due to right anterior thigh pain.  D-dimer was negative.  She did not have any electrolyte imbalance.  Patient states the pain had improved but Saturday she went to [facility] and after walking for multiple hours, her right leg became painful.  She describes the pain as an intermittent ache within the lateral aspect of her upper thigh.  She had a massage performed with some relief.  She has been exercising at [employer] with cardiovascular and strength training classes.  She also walks on a treadmill.  Patient also noticed a red bump behind her thigh 2 months ago.  Patient states occasionally it is painful and itchy.  She did travel to [place] at that time.  She has not tried taking any over-the-counter products for her symptoms.  Patient is interested in a prescription for Claritin-D.  She has cats at home and know she is allergic to them.  She has difficulty breathing through her nose at bedtime.  She does take the Claritin-D once daily.    Review of Systems: A complete 14 point review of systems was obtained and is negative or as stated in the history of present illness.    Social History  "    Social History     Marital status:      Spouse name: N/A     Number of children: N/A     Years of education: N/A     Occupational History     Not on file.     Social History Main Topics     Smoking status: Never Smoker     Smokeless tobacco: Never Used     Alcohol use 0.6 oz/week     1 Glasses of wine per week     Drug use: No     Sexual activity: Yes     Partners: Male      Comment:      Other Topics Concern     Not on file     Social History Narrative       Active Ambulatory Problems     Diagnosis Date Noted     Type 2 diabetes mellitus (H) 10/24/2017     Hyperlipidemia, unspecified hyperlipidemia type 10/24/2017     Edema, unspecified type 10/24/2017     Class 1 obesity due to excess calories with serious comorbidity and body mass index (BMI) of 33.0 to 33.9 in adult 10/24/2017     Resolved Ambulatory Problems     Diagnosis Date Noted     No Resolved Ambulatory Problems     Past Medical History:   Diagnosis Date     Diabetes mellitus (H)      Environmental allergies      Hyperlipidemia        Family History   Problem Relation Age of Onset     Lung cancer Mother      Diabetes Mother      Heart disease Father      COPD Father      Dementia Father      Parkinsonism Father      Hyperlipidemia Father      Diabetes Maternal Aunt      Diabetes Maternal Grandmother      Diabetes Paternal Grandfather        Objective:     /72  Ht 5' 2.5\" (1.588 m)  Wt 198 lb 3.2 oz (89.9 kg)  BMI 35.67 kg/m2    Patient is alert, in no obvious distress.   Skin: Warm, dry.  No lesions or rashes.  She has a 5-6 mm raised flesh colored lesion present within her right posterior thigh.  It is slightly firm suggestive of a dermatofibroma.  Lungs:  Clear to auscultation. Respirations even and unlabored.  No wheezing or rales noted.   Heart:  Regular rate and rhythm.  No murmurs, S3, S4, gallops, or rubs.    Musculoskeletal:  Full ROM of extremities.  DTRs symmetrical, sensations intact.  No obvious deformity.  Muscle " strength equal +5/5. She is tender to palpation of her right mid IT band.

## 2021-06-21 NOTE — PROGRESS NOTES
Assessment and Plan:     1. Diabetes mellitus, type 2 (H)  Hemoglobin A1c has worsened 8%.  Will refer to diabetes education for initiation of Victoza or equivalent.  She will continue metformin as prescribed.  - Glycosylated Hemoglobin A1c  - metFORMIN (GLUCOPHAGE) 500 MG tablet; TAKE 2 TABLET BY MOUTH TWICE DAILY WITH MEALS..  Dispense: 360 tablet; Refill: 0  - Ambulatory referral to Diabetes Education Program (Existing Diagnosis)    2. Hyperlipidemia, unspecified hyperlipidemia type  She continues pravastatin and fenofibrate.  Will check liver enzymes.  - Lipid Cascade    3. Hypercalcemia  Patient's calcium was mildly elevated.  Will recheck CMP and vitamin D.  - Comprehensive Metabolic Panel  - Vitamin D, Total (25-Hydroxy)    4. Edema, unspecified type  She continues hydrochlorothiazide.  This is controlled.    5. Morbid obesity (H)  The following high BMI interventions were performed this visit: encouragement to exercise and lifestyle education regarding diet    6. Visit for screening mammogram  - Mammo Screening Bilateral; Future    7. Health care maintenance  - Influenza, Recombinant, Inj, Quadrivalent, PF, 18+YRS    The patient is content with the plan and will follow-up in 3 months for medication management or sooner with any further concerns.       Subjective:     Josefina is a 53 y.o. female presenting to the clinic for medication management.  Patient has type 2 diabetes.  Her last hemoglobin A1c was 7.8% on 8/23/18.  She has been checking her blood sugars and they have been ranging 190-200 fasting.  She is taking metformin 1000 mg twice daily.  She is tolerating the medication well.  She has an eye exam today.  Patient has lower extremity edema and takes the hydrochlorothiazide 25 mg daily.  This is working well for her.  She has hyperlipidemia.  Last cholesterol check was on 8/23/18 with a total cholesterol 216, triglycerides 143, , HDL 52.  She is taking fenofibrate 145 mg daily and pravastatin  80 mg daily.  She denies chest pain, shortness of breath with exertion, edema, orthopnea, syncope.  She is taking Claritin 10 mg daily and using Flonase daily for allergies.  She has a history of vitamin D deficiency and is taking 5000 units daily.    Review of Systems: A complete 14 point review of systems was obtained and is negative or as stated in the history of present illness.    Social History     Socioeconomic History     Marital status:      Spouse name: Not on file     Number of children: Not on file     Years of education: Not on file     Highest education level: Not on file   Social Needs     Financial resource strain: Not on file     Food insecurity - worry: Not on file     Food insecurity - inability: Not on file     Transportation needs - medical: Not on file     Transportation needs - non-medical: Not on file   Occupational History     Not on file   Tobacco Use     Smoking status: Never Smoker     Smokeless tobacco: Never Used   Substance and Sexual Activity     Alcohol use: Yes     Alcohol/week: 0.6 oz     Types: 1 Glasses of wine per week     Drug use: No     Sexual activity: Yes     Partners: Male     Comment:    Other Topics Concern     Not on file   Social History Narrative     Not on file       Active Ambulatory Problems     Diagnosis Date Noted     Type 2 diabetes mellitus (H) 10/24/2017     Hyperlipidemia, unspecified hyperlipidemia type 10/24/2017     Edema, unspecified type 10/24/2017     Class 1 obesity due to excess calories with serious comorbidity and body mass index (BMI) of 33.0 to 33.9 in adult 10/24/2017     Morbid obesity (H) 08/23/2018     Resolved Ambulatory Problems     Diagnosis Date Noted     No Resolved Ambulatory Problems     Past Medical History:   Diagnosis Date     Diabetes mellitus (H)      Environmental allergies      Hyperlipidemia        Family History   Problem Relation Age of Onset     Lung cancer Mother      Diabetes Mother      Heart disease Father   "    COPD Father      Dementia Father      Parkinsonism Father      Hyperlipidemia Father      Diabetes Maternal Aunt      Diabetes Maternal Grandmother      Diabetes Paternal Grandfather        Objective:     /78   Pulse 86   Ht 5' 1.5\" (1.562 m)   Wt 199 lb 3.2 oz (90.4 kg)   SpO2 98%   BMI 37.03 kg/m      Patient is alert, in no obvious distress.   Skin: Warm, dry.  No lesions or rashes.  Skin turgor rapid return.   HEENT:  Head normocephalic, atraumatic.  Eyes normal.  Ears normal.  Nose patent, mucosa pink.  Oropharynx mucosa pink.  No lesions or tonsillar enlargement.   Neck: Supple, no lymphadenopathy.  Lungs:  Clear to auscultation. Respirations even and unlabored.  No wheezing or rales noted.   Heart:  Regular rate and rhythm.  No murmurs..      LABORATORY: Hemoglobin A1c ordered and is 8%.            "

## 2021-06-22 NOTE — PROGRESS NOTES
"  Assessment: Josefina is here alone for her visit to talk about adding additional medication to her regimen.  Stated that over the summer she was not \"doing what I am supposed to do\".  Since her visit, she has been taking her medications regularly and eating clean.    She is currently taking Metformin 1000mg two times a day.  Stated she is pretty good about taking this.  Since increasing her dose, she has had some trouble with diarrhea, stated it has been tolerable.     She has not checked her blood sugars for some time.  Stated the readings were always high and she got frustrated.  Some of her fasting readings are noted below:  188  183  176  189  205  182  203  178    She eats 3 meals a day and does have snacks between if she is hungry.  Stated she has got back to eating clean, does not elaborate on what this means to her.    Josefina has been doing some walking, no much since the weather got cold.  She was in PT for a while, but this has since ended.  She is thinking about doing mall walking this winter.    We discuss different medication options.  Reviewed GLP-1, how it works, when to take and possible side effects.        Plan: Based on ease of regimen and insurance we decide to more forward with Trulicity 0.75mg weekly.  Reviewed injection technique.  Asked to follow-up in 1 month.  No appointment made at this time.    Subjective and Objective:      Josefina Damico is referred by Sole Ch CNP for Diabetes Education.     Lab Results   Component Value Date    HGBA1C 8.0 (H) 11/26/2018     Follow up:   Primary care visit  CDE (certified diabetic educator)      Education:     Monitoring   Meter (per above goals): Assessed and Discussed  Monitoring: Assessed and Discussed  BG goals: Assessed and Discussed    Nutrition Management  Nutrition Management: Assessed and Discussed  Weight: Assessed and Discussed  Portions/Balance: Not addressed  Carb ID/Count: Not addressed  Label Reading: Not addressed  Heart Healthy " Fats: Not addressed  Menu Planning: Assessed and Discussed  Dining Out: Not addressed  Physical Activity: Assessed and Discussed  Medications: Assessed and Discussed  Orals: Assessed and Discussed  Injected Medications: Assessed, Discussed and Literature provided   Storage/Exp:Assessed, Discussed and Literature provided   Site Rotation: Assessed and Discussed   Sites Assessed: no    Diabetes Disease Process: Assessed and Discussed    Acute Complications: Prevent, Detect, Treat:  Hypoglycemia: Assessed and Discussed  Hyperglycemia: Assessed and Discussed  Sick Days: Assessed and Discussed  Driving: Not addressed    Chronic Complications  Foot Care:Not addressed  Skin Care: Not addressed  Eye: Not addressed  ABC: Not addressed  Teeth:Not addressed  Goal Setting and Problem Solving: Assessed and Discussed  Barriers: Assessed and Discussed  Psychosocial Adjustments: Assessed and Discussed      Time spent with the patient: 30 minutes for diabetes education and counseling.   Previous Education: yes  Visit Type:DSMT  Hours Remaining: DSMT 1.5 and MNT 2      Jennie Dey  1/4/2019

## 2021-06-24 NOTE — TELEPHONE ENCOUNTER
Refill Request  Did you contact pharmacy: No.  Patient was informed to call the pharmacy.  Medication name:   Requested Prescriptions     Pending Prescriptions Disp Refills     hydroCHLOROthiazide (HYDRODIURIL) 25 MG tablet 90 tablet 1     Sig: Take 1 tablet (25 mg total) by mouth daily.     dulaglutide (TRULICITY) 0.75 mg/0.5 mL PnIj 4 Syringe 2     Sig: Inject 0.75 mg under the skin every 7 days.     Who prescribed the medication: Sole Ch CNP   [facility] and [facility]  Is patient out of medication: No.  3 days left  Patient notified refills processed in 72 hours:  yes  Okay to leave a detailed message: yes

## 2021-06-24 NOTE — TELEPHONE ENCOUNTER
Refill Approved    Rx renewed per Medication Renewal Policy. Medication was last renewed on 11/26/18.    Marine Moon, [facility] Triage/Med Refill 2/25/2019     Requested Prescriptions   Pending Prescriptions Disp Refills     pravastatin (PRAVACHOL) 80 MG tablet [Pharmacy Med Name: PRAVASTATIN SODIUM 80 MG TAB] 90 tablet 0     Sig: TAKE 1 TABLET (80 MG TOTAL) BY MOUTH EVERY EVENING.    Statins Refill Protocol (Hmg CoA Reductase Inhibitors) Passed - 2/24/2019  8:26 AM       Passed - PCP or prescribing provider visit in past 12 months     Last office visit with prescriber/PCP: 11/26/2018 Sole Ch CNP OR same dept: 11/26/2018 Sole Ch CNP OR same specialty: 11/26/2018 Sole Ch CNP  Last physical: 10/24/2017 Last MTM visit: Visit date not found   Next visit within 3 mo: Visit date not found  Next physical within 3 mo: Visit date not found  Prescriber OR PCP: Sole Ch CNP  Last diagnosis associated with med order: 1. Diabetes mellitus, type 2 (H)  - metFORMIN (GLUCOPHAGE) 500 MG tablet [Pharmacy Med Name: METFORMIN  MG TABLET]; TAKE 2 TABLET BY MOUTH TWICE DAILY WITH MEALS..  Dispense: 360 tablet; Refill: 0    If protocol passes may refill for 12 months if within 3 months of last provider visit (or a total of 15 months).             metFORMIN (GLUCOPHAGE) 500 MG tablet [Pharmacy Med Name: METFORMIN  MG TABLET] 360 tablet 0     Sig: TAKE 2 TABLET BY MOUTH TWICE DAILY WITH MEALS..    Metformin Refill Protocol Passed - 2/24/2019  8:26 AM       Passed - Blood pressure in last 12 months    BP Readings from Last 1 Encounters:   11/26/18 120/78            Passed - LFT or AST or ALT in last 12 months    Albumin   Date Value Ref Range Status   11/26/2018 3.9 3.5 - 5.0 g/dL Final     Bilirubin, Total   Date Value Ref Range Status   11/26/2018 0.4 0.0 - 1.0 mg/dL Final     Alkaline Phosphatase   Date Value Ref Range Status   11/26/2018 67 45 - 120 U/L Final     AST   Date Value Ref  Range Status   11/26/2018 16 0 - 40 U/L Final     ALT   Date Value Ref Range Status   11/26/2018 25 0 - 45 U/L Final     Protein, Total   Date Value Ref Range Status   11/26/2018 7.4 6.0 - 8.0 g/dL Final               Passed - GFR or Serum Creatinine in last 6 months    GFR MDRD Non Af Amer   Date Value Ref Range Status   11/26/2018 >60 >60 mL/min/1.73m2 Final     GFR MDRD Af Amer   Date Value Ref Range Status   11/26/2018 >60 >60 mL/min/1.73m2 Final            Passed - Visit with PCP or prescribing provider visit in last 6 months or next 3 months    Last office visit with prescriber/PCP: 11/26/2018 OR same dept: 11/26/2018 Sole Ch CNP OR same specialty: 11/26/2018 Sole Ch CNP Last physical: Visit date not found Last MTM visit: Visit date not found         Next appt within 3 mo: Visit date not found  Next physical within 3 mo: Visit date not found  Prescriber OR PCP: Sole Ch CNP  Last diagnosis associated with med order: 1. Diabetes mellitus, type 2 (H)  - metFORMIN (GLUCOPHAGE) 500 MG tablet [Pharmacy Med Name: METFORMIN  MG TABLET]; TAKE 2 TABLET BY MOUTH TWICE DAILY WITH MEALS..  Dispense: 360 tablet; Refill: 0     If protocol passes may refill for 12 months if within 3 months of last provider visit (or a total of 15 months).          Passed - A1C in last 6 months    Hemoglobin A1c   Date Value Ref Range Status   11/26/2018 8.0 (H) 3.5 - 6.0 % Final              Passed - Microalbumin in last year     Microalbumin, Random Urine   Date Value Ref Range Status   08/23/2018 <0.50 0.00 - 1.99 mg/dL Final

## 2021-06-25 NOTE — TELEPHONE ENCOUNTER
Refill Approved    Rx renewed per Medication Renewal Policy. Medication was last renewed on 8/23/18.    Ov: 11/26/18    Dee Mckeon, [facility] Triage/Med Refill 3/15/2019     Requested Prescriptions   Pending Prescriptions Disp Refills     hydroCHLOROthiazide (HYDRODIURIL) 25 MG tablet 90 tablet 1     Sig: Take 1 tablet (25 mg total) by mouth daily.    Diuretics/Combination Diuretics Refill Protocol  Passed - 3/11/2019 10:12 AM       Passed - Visit with PCP or prescribing provider visit in past 12 months    Last office visit with prescriber/PCP: 11/26/2018 Sole Ch CNP OR same dept: 11/26/2018 Sole Ch CNP OR same specialty: 11/26/2018 Sole Ch CNP  Last physical: 10/24/2017 Last MTM visit: Visit date not found   Next visit within 3 mo: Visit date not found  Next physical within 3 mo: Visit date not found  Prescriber OR PCP: Sole Ch CNP  Last diagnosis associated with med order: 1. Edema, unspecified type  - hydroCHLOROthiazide (HYDRODIURIL) 25 MG tablet; Take 1 tablet (25 mg total) by mouth daily.  Dispense: 90 tablet; Refill: 1    2. Type 2 diabetes mellitus with hyperglycemia, without long-term current use of insulin (H)  - dulaglutide (TRULICITY) 0.75 mg/0.5 mL PnIj; Inject 0.75 mg under the skin every 7 days.  Dispense: 4 Syringe; Refill: 2    If protocol passes may refill for 12 months if within 3 months of last provider visit (or a total of 15 months).            Passed - Serum Potassium in past 12 months     Lab Results   Component Value Date    Potassium 4.2 11/26/2018            Passed - Serum Sodium in past 12 months     Lab Results   Component Value Date    Sodium 139 11/26/2018            Passed - Blood pressure on file in past 12 months    BP Readings from Last 1 Encounters:   11/26/18 120/78            Passed - Serum Creatinine in past 12 months     Creatinine   Date Value Ref Range Status   11/26/2018 0.82 0.60 - 1.10 mg/dL Final             dulaglutide  (TRULICITY) 0.75 mg/0.5 mL PnIj 4 Syringe 2     Sig: Inject 0.75 mg under the skin every 7 days.    Insulin/GLP-1 Refill Protocol Passed - 3/11/2019 10:12 AM       Passed - Visit with PCP or prescribing provider visit in last 6 months    Last office visit with prescriber/PCP: 11/26/2018 OR same dept: 11/26/2018 Sole Ch CNP OR same specialty: 11/26/2018 Sole Ch CNP Last physical: Visit date not found Last MTM visit: Visit date not found     Next appt within 3 mo: Visit date not found  Next physical within 3 mo: Visit date not found  Prescriber OR PCP: Sole hC CNP  Last diagnosis associated with med order: 1. Edema, unspecified type  - hydroCHLOROthiazide (HYDRODIURIL) 25 MG tablet; Take 1 tablet (25 mg total) by mouth daily.  Dispense: 90 tablet; Refill: 1    2. Type 2 diabetes mellitus with hyperglycemia, without long-term current use of insulin (H)  - dulaglutide (TRULICITY) 0.75 mg/0.5 mL PnIj; Inject 0.75 mg under the skin every 7 days.  Dispense: 4 Syringe; Refill: 2    If protocol passes may refill for 6 months if within 3 months of last provider visit (or a total of 9 months).             Passed - A1C in last 6 months    Hemoglobin A1c   Date Value Ref Range Status   11/26/2018 8.0 (H) 3.5 - 6.0 % Final              Passed - Microalbumin in last year    Microalbumin, Random Urine   Date Value Ref Range Status   08/23/2018 <0.50 0.00 - 1.99 mg/dL Final                 Passed - Blood pressure in last year    BP Readings from Last 1 Encounters:   11/26/18 120/78            Passed - Creatinine done in last year    Creatinine   Date Value Ref Range Status   11/26/2018 0.82 0.60 - 1.10 mg/dL Final

## 2021-06-26 NOTE — PROGRESS NOTES
Assessment: pt seen today for DM education. She has had DM for around 6 years or more. Pt just increased her trulicity to 3 mg/week, she has taken 3 doses of this increased dose so far. Pt checks BG every morning, she brings in BG meter. Most recent readings: 198, 197, 215, 210, 208, 204, 185, 215, 226, 223, 222, 192, 188, 204, 203, 210, 217, 197, 246, 213, 201. Looked farther and BG patterns are not really improved since increase in trulicity. Pt has history of DM in her family with complications and she is concerned that complications could happen to her as well.   We discussed ways to improve BG.     Diet: pt is aware of cho foods and portions. She notes she has done WW in the past. She is eating 3 meals/day. Food recall indicates she is not a really heavy cho eater. Eating lots of protein and vegetables and fruit. Watches portions of heavy cho foods and eats sweets very occasionally.     Exercise: pt is at a desk all day at work. Not a lot of walking. She notes she is exhausted by the time she gets home. Discussed trying to increase a little bit, maybe doing a 10 min walk 1-2x/week to start.     Discussed CGM to get better insight into BG patterns. After discussing we decided to do the dexcom pro 10 day. Pt was able to download dexcom and dexcom clarity ave. Sensor was placed on her left abdomen.     Plan: pt will call in 10 days after the sensor is completed and will give the share code. Will review BG patterns at that time and go from there.     Subjective and Objective:      Josefina Damico is referred by Sole Ch CNP for Diabetes Education.     Lab Results   Component Value Date    HGBA1C 8.2 (H) 05/07/2021       Current diabetes medications:  Metformin 1g two times a day, trulicity 3mg/week         Education:     Monitoring   Meter (per above goals): Assessed and Discussed  Monitoring: Assessed and Discussed  BG goals: Discussed    Nutrition Management  Nutrition Management: Assessed and  Discussed  Weight: Discussed  Portions/Balance: Assessed and Discussed  Carb ID/Count: Assessed and Discussed  Label Reading: Assessed and Discussed  Heart Healthy Fats: Not addressed  Menu Planning: Assessed and Discussed  Dining Out: Not addressed  Physical Activity: Assessed and Discussed  Medications: Discussed  Orals: Discussed  Injected Medications: Discussed   Storage/Exp:Not addressed   Site Rotation: Discussed   Sites Assessed: yes    Diabetes Disease Process: Discussed    Acute Complications: Prevent, Detect, Treat:  Hypoglycemia: Assessed and Discussed  Hyperglycemia: Assessed and Discussed  Sick Days: Not addressed  Driving: Not addressed    Chronic Complications  Foot Care:Not addressed  Skin Care: Not addressed  Eye: Not addressed  ABC: Discussed  Teeth:Not addressed  Goal Setting and Problem Solving: Assessed and Discussed  Barriers: Assessed and Discussed  Psychosocial Adjustments: Assessed and Discussed      Time spent with the patient: 60 minutes for diabetes education and counseling.   Previous Education: yes  Visit Type:ELOISA Griffin  6/4/2021

## 2021-06-28 ENCOUNTER — RECORDS - HEALTHEAST (OUTPATIENT)
Dept: ADMINISTRATIVE | Facility: OTHER | Age: 56
End: 2021-06-28

## 2021-06-28 ENCOUNTER — OFFICE VISIT - HEALTHEAST (OUTPATIENT)
Dept: EDUCATION SERVICES | Facility: CLINIC | Age: 56
End: 2021-06-28

## 2021-06-28 DIAGNOSIS — E11.65 TYPE 2 DIABETES MELLITUS WITH HYPERGLYCEMIA, WITHOUT LONG-TERM CURRENT USE OF INSULIN (H): ICD-10-CM

## 2021-06-28 DIAGNOSIS — E78.2 MIXED HYPERLIPIDEMIA: ICD-10-CM

## 2021-06-28 RX ORDER — INSULIN DEGLUDEC 100 U/ML
10 INJECTION, SOLUTION SUBCUTANEOUS DAILY
Qty: 15 ML | Refills: 3 | Status: SHIPPED | OUTPATIENT
Start: 2021-06-28 | End: 2022-02-04

## 2021-06-28 RX ORDER — PRAVASTATIN SODIUM 80 MG/1
80 TABLET ORAL EVERY EVENING
Qty: 90 TABLET | Refills: 2 | Status: SHIPPED | OUTPATIENT
Start: 2021-06-28 | End: 2022-02-04

## 2021-06-30 ENCOUNTER — RECORDS - HEALTHEAST (OUTPATIENT)
Dept: ADMINISTRATIVE | Facility: OTHER | Age: 56
End: 2021-06-30

## 2021-07-04 NOTE — ADDENDUM NOTE
Addendum Note by Jaci Chanel, PharmELIGH ANN at 2/3/2021  7:46 AM     Author: Jaci Chanel, PharmLEIGH ANN Service: -- Author Type: Pharmacist    Filed: 2/3/2021  7:46 AM Encounter Date: 2/1/2021 Status: Signed    : Jaci Chanel PharmD (Pharmacist)    Addended by: JACI CHANEL on: 2/3/2021 07:46 AM        Modules accepted: Orders

## 2021-07-04 NOTE — TELEPHONE ENCOUNTER
Telephone Encounter by Ana Cooper at 6/16/2021  3:18 PM     Author: Ana Cooper Service: -- Author Type: --    Filed: 6/16/2021  3:18 PM Encounter Date: 6/14/2021 Status: Signed    : Ana Cooper         6/16 - lvm x 1 to schedule in clinic appt       Jennie Dey, Diabetes Ed  You 4 minutes ago (3:13 PM)     I have a 8am at [place] on July 1st.  I out a hold for this patient if that works for him.   Hui    Message text       Brenda Newsome, RD  You 1 hour ago (2:02 PM)     See if she would be open to a virtual visit with me just to get started.    I can add her to my schedule for face to face (over noon hour) if the virtual doesn't work.  thanks

## 2021-07-07 NOTE — PROGRESS NOTES
Progress Notes by Brenda Newsome RD at 6/28/2021 12:00 PM     Author: Brenda Newsome RD Service: -- Author Type: Registered Dietitian    Filed: 6/28/2021 12:56 PM Encounter Date: 6/28/2021 Status: Attested    : Brenda Newsome RD (Registered Dietitian) Cosigner: Sole Ch CNP at 6/28/2021  1:20 PM    Attestation signed by Sole Ch CNP at 6/28/2021  1:20 PM                     Assessment: pt seen today for DM education. She has had DM for around 6 years or more.  Pt currently taking trulicity 3 mg/week,  and metformin 1000 mg/ bid.  She is here today for basal insulin initiation.  Did recent pro continuous glucose sensor and readings were consistently in the 180- 200 mg/dl.      FBS:  167, 190, 175 mg/dl     Recently sprained ankle so activity somewhat limited.   She has done WW in the past. She is eating 3 meals/day.  Healthy choices, low carb.        Plan:  Following protocol to start 8 units Tresiba (preferred by her insurance) at bedtime  ( 87 kg x 0.1 units insulin).   To increase to 10 units after 3 days if FBS is > 130 mg/dl.   She repeated these instructions.   Reviewed signs/treatment for hypoglycemia.   Recommnend checking FBS and one other 2 hour post meal reading at varying times.  She returned demonstration of insulin pen injections, written materials provided.   PHone follow up in 3 weeks.           Subjective and Objective:       Josefina Damico is referred by Sole Ch CNP for Diabetes Education.            Lab Results   Component Value Date     HGBA1C 8.2 (H) 05/07/2021              Education:      Monitoring   Meter (per above goals): Assessed and Discussed  - she looked up her insurance coverage for personal cgm,  3 times daily insulin injections.  Monitoring: Assessed and Discussed  BG goals: Discussed     Nutrition Management  Nutrition Management: Assessed and Discussed  Weight: Discussed  Portions/Balance: Assessed and Discussed  Carb ID/Count:  Assessed and Discussed  Label Reading: Assessed and Discussed  Heart Healthy Fats: Not addressed  Menu Planning: Assessed and Discussed  Dining Out: Not addressed  Physical Activity: Assessed and Discussed  Medications: Discussed  Orals: Discussed  Injected Medications: Discussed, literature, returned demo  Storage/Exp:Not addressed   Site Rotation: Discussed   Sites Assessed: yes     Diabetes Disease Process: Discussed     Acute Complications: Prevent, Detect, Treat:  Hypoglycemia: Assessed and Discussed  Hyperglycemia: Assessed and Discussed  Sick Days: Not addressed  Driving: Not addressed     Chronic Complications  -  literature provided  Foot Care:Not addressed  Skin Care: Not addressed  Eye: Not addressed  ABC: Discussed  Teeth:Not addressed  Goal Setting and Problem Solving: Assessed and Discussed  Barriers: Assessed and Discussed  Psychosocial Adjustments: Assessed and Discussed     30 mintues DSMT

## 2021-07-13 ENCOUNTER — VIRTUAL VISIT (OUTPATIENT)
Dept: EDUCATION SERVICES | Facility: CLINIC | Age: 56
End: 2021-07-13
Payer: COMMERCIAL

## 2021-07-13 DIAGNOSIS — E11.9 TYPE 2 DIABETES MELLITUS (H): Primary | ICD-10-CM

## 2021-07-13 PROCEDURE — 98967 PH1 ASSMT&MGMT NQHP 11-20: CPT | Mod: 95 | Performed by: DIETITIAN, REGISTERED

## 2021-07-13 NOTE — LETTER
7/13/2021         RE: Josefina Damico  [address] [facility] [address]        Dear Colleague,    Thank you for referring your patient, Josefina Damico, to the [facility]. Please see a copy of my visit note below.           Assessment: Phone call today for DM education. She has had DM for around 6 years or more.  Pt currently taking trulicity 3 mg/week,  and metformin 1000 mg/ bid.  She started basal insulin 2 weeks ago.  Currently takng 10 units Tresiba once daily.     FBS:  142, 164, 165, 168    2 hr BF:  198, 199  2 Hr L  170, 182  2 hr D  228 mg/dl    She has done Weight Watchers in the past. She is eating 3 meals/day.  Healthy choices, low carb.        Plan:  Following protocol to increase Tresiba to 12 units once daily.  To increase by 2 units every 3 days until FBS < 130 mg/dl.  She verbalized her understanding and felt comfortable with instructions.   Reviewed signs/treatment for hypoglycemia.   To continue to check FBS and one other 2 hour post meal reading at varying times.   PHone follow up in 3 weeks.              Subjective and Objective:       Josefina Damico is referred by Sole Ch CNP for Diabetes Education.                Lab Results   Component Value Date     HGBA1C 8.2 (H) 05/07/2021               Education:      Monitoring   Meter (per above goals): Assessed and Discussed  - she looked up her insurance coverage for personal cgm,  3 times daily insulin injections.  Monitoring: Assessed and Discussed  BG goals: Discussed     Nutrition Management  Nutrition Management: Assessed and Discussed  Weight: Discussed  Portions/Balance: Assessed and Discussed  Carb ID/Count: Assessed and Discussed  Label Reading: Assessed and Discussed  Heart Healthy Fats: Not addressed  Menu Planning: Assessed and Discussed  Dining Out: Not addressed  Physical Activity: Assessed and Discussed  Medications: Discussed  Orals: Discussed  Injected Medications: Discussed, literature,  returned demo  Storage/Exp:Not addressed   Site Rotation: Discussed   Sites Assessed: yes     Diabetes Disease Process: Discussed     Acute Complications: Prevent, Detect, Treat:  Hypoglycemia: Assessed and Discussed  Hyperglycemia: Assessed and Discussed  Sick Days: Not addressed  Driving: Not addressed     Chronic Complications  -  literature provided  Foot Care:Not addressed  Skin Care: Not addressed  Eye: Not addressed  ABC: Discussed  Teeth:Not addressed  Goal Setting and Problem Solving: Assessed and Discussed  Barriers: Assessed and Discussed  Psychosocial Adjustments: Assessed and Discussed     20 mintues DSMT - phone

## 2021-07-15 NOTE — PROGRESS NOTES
Assessment: Phone call today for DM education. She has had DM for around 6 years or more.  Pt currently taking trulicity 3 mg/week,  and metformin 1000 mg/ bid.  She started basal insulin 2 weeks ago.  Currently takng 10 units Tresiba once daily.     FBS:  142, 164, 165, 168    2 hr BF:  198, 199  2 Hr L  170, 182  2 hr D  228 mg/dl    She has done Weight Watchers in the past. She is eating 3 meals/day.  Healthy choices, low carb.        Plan:  Following protocol to increase Tresiba to 12 units once daily.  To increase by 2 units every 3 days until FBS < 130 mg/dl.  She verbalized her understanding and felt comfortable with instructions.   Reviewed signs/treatment for hypoglycemia.   To continue to check FBS and one other 2 hour post meal reading at varying times.   PHone follow up in 3 weeks.              Subjective and Objective:       Josefina Damico is referred by Sole Ch CNP for Diabetes Education.                Lab Results   Component Value Date     HGBA1C 8.2 (H) 05/07/2021               Education:      Monitoring   Meter (per above goals): Assessed and Discussed  - she looked up her insurance coverage for personal cgm,  3 times daily insulin injections.  Monitoring: Assessed and Discussed  BG goals: Discussed     Nutrition Management  Nutrition Management: Assessed and Discussed  Weight: Discussed  Portions/Balance: Assessed and Discussed  Carb ID/Count: Assessed and Discussed  Label Reading: Assessed and Discussed  Heart Healthy Fats: Not addressed  Menu Planning: Assessed and Discussed  Dining Out: Not addressed  Physical Activity: Assessed and Discussed  Medications: Discussed  Orals: Discussed  Injected Medications: Discussed, literature, returned demo  Storage/Exp:Not addressed   Site Rotation: Discussed   Sites Assessed: yes     Diabetes Disease Process: Discussed     Acute Complications: Prevent, Detect, Treat:  Hypoglycemia: Assessed and Discussed  Hyperglycemia: Assessed and  Discussed  Sick Days: Not addressed  Driving: Not addressed     Chronic Complications  -  literature provided  Foot Care:Not addressed  Skin Care: Not addressed  Eye: Not addressed  ABC: Discussed  Teeth:Not addressed  Goal Setting and Problem Solving: Assessed and Discussed  Barriers: Assessed and Discussed  Psychosocial Adjustments: Assessed and Discussed     20 mintues DSMT - phone

## 2021-07-28 ENCOUNTER — VIRTUAL VISIT (OUTPATIENT)
Dept: EDUCATION SERVICES | Facility: CLINIC | Age: 56
End: 2021-07-28
Payer: COMMERCIAL

## 2021-07-28 DIAGNOSIS — E11.9 TYPE 2 DIABETES MELLITUS (H): Primary | ICD-10-CM

## 2021-07-28 PROCEDURE — 98967 PH1 ASSMT&MGMT NQHP 11-20: CPT | Mod: 95 | Performed by: DIETITIAN, REGISTERED

## 2021-07-28 NOTE — PROGRESS NOTES
Assessment: Phone call today for DM education. She has had DM for around 6 years or more.  Pt currently taking 18 units Tresiba insulin, Trulicity 3 mg once weekly, and metformin 1000 mg/ bid.   No reported symptoms of hypoglycemia.   Activity limited, due to foot injury- wears boot.  Due for Alc mid August.     FBS:  126, 165, 144, 188 (?),      2 hr BF:  187, 161, 126  2 Hr L  186, 161  2 hr D  168, 190 (food related)  mg/dl     She has done Weight Watchers in the past. She is eating 3 meals/day.  Healthy choices, low carb.        Plan:  Following protocol to increase Tresiba to 20 units once daily.  To increase by 2 units every 3 days until FBS < 130 mg/dl.  She verbalized her understanding and felt comfortable with instructions.  Due for Alc mid August.   Will do phone follow up after that lab result..   Reviewed signs/treatment for hypoglycemia.   To continue to check FBS and one other 2 hour post meal reading at varying times.              Subjective and Objective:       Josefina Damico is referred by Sole Ch CNP for Diabetes Education.                Lab Results   Component Value Date     HGBA1C 8.2 (H) 05/07/2021               Education:      Monitoring   Meter (per above goals): Assessed and Discussed  - she looked up her insurance coverage for personal cgm,  3 times daily insulin injections.  Monitoring: Assessed and Discussed  BG goals: Discussed     Nutrition Management  Nutrition Management: Assessed and Discussed  Weight: Discussed  Portions/Balance: Assessed and Discussed  Carb ID/Count: Assessed and Discussed  Label Reading: Assessed and Discussed  Heart Healthy Fats: Not addressed  Menu Planning: Assessed and Discussed  Dining Out: Not addressed  Physical Activity: Assessed and Discussed  Medications: Discussed  Orals: Discussed  Injected Medications: Discussed, literature, returned demo  Storage/Exp:Not addressed   Site Rotation: Discussed   Sites Assessed: yes     Diabetes Disease  Process: Discussed     Acute Complications: Prevent, Detect, Treat:  Hypoglycemia: Assessed and Discussed  Hyperglycemia: Assessed and Discussed  Sick Days: Not addressed  Driving: Not addressed     Chronic Complications  -  literature provided  Foot Care:Not addressed  Skin Care: Not addressed  Eye: Not addressed  ABC: Discussed  Teeth:Not addressed  Goal Setting and Problem Solving: Assessed and Discussed  Barriers: Assessed and Discussed  Psychosocial Adjustments: Assessed and Discussed     20 mintues DSMT - phone

## 2021-07-28 NOTE — LETTER
7/28/2021         RE: Josefina Damico  [address] [facility] [address]        Dear Colleague,    Thank you for referring your patient, Josefina Damico, to the [facility]. Please see a copy of my visit note below.    Assessment: Phone call today for DM education. She has had DM for around 6 years or more.  Pt currently taking 18 units Tresiba insulin, Trulicity 3 mg once weekly, and metformin 1000 mg/ bid.   No reported symptoms of hypoglycemia.   Activity limited, due to foot injury- wears boot.  Due for Alc mid August.     FBS:  126, 165, 144, 188 (?),      2 hr BF:  187, 161, 126  2 Hr L  186, 161  2 hr D  168, 190 (food related)  mg/dl     She has done Weight Watchers in the past. She is eating 3 meals/day.  Healthy choices, low carb.        Plan:  Following protocol to increase Tresiba to 20 units once daily.  To increase by 2 units every 3 days until FBS < 130 mg/dl.  She verbalized her understanding and felt comfortable with instructions.  Due for Alc mid August.   Will do phone follow up after that lab result..   Reviewed signs/treatment for hypoglycemia.   To continue to check FBS and one other 2 hour post meal reading at varying times.              Subjective and Objective:       Josefina Damico is referred by Sole Ch CNP for Diabetes Education.                Lab Results   Component Value Date     HGBA1C 8.2 (H) 05/07/2021               Education:      Monitoring   Meter (per above goals): Assessed and Discussed  - she looked up her insurance coverage for personal cgm,  3 times daily insulin injections.  Monitoring: Assessed and Discussed  BG goals: Discussed     Nutrition Management  Nutrition Management: Assessed and Discussed  Weight: Discussed  Portions/Balance: Assessed and Discussed  Carb ID/Count: Assessed and Discussed  Label Reading: Assessed and Discussed  Heart Healthy Fats: Not addressed  Menu Planning: Assessed and Discussed  Dining Out: Not  addressed  Physical Activity: Assessed and Discussed  Medications: Discussed  Orals: Discussed  Injected Medications: Discussed, literature, returned demo  Storage/Exp:Not addressed   Site Rotation: Discussed   Sites Assessed: yes     Diabetes Disease Process: Discussed     Acute Complications: Prevent, Detect, Treat:  Hypoglycemia: Assessed and Discussed  Hyperglycemia: Assessed and Discussed  Sick Days: Not addressed  Driving: Not addressed     Chronic Complications  -  literature provided  Foot Care:Not addressed  Skin Care: Not addressed  Eye: Not addressed  ABC: Discussed  Teeth:Not addressed  Goal Setting and Problem Solving: Assessed and Discussed  Barriers: Assessed and Discussed  Psychosocial Adjustments: Assessed and Discussed     20 mintues DSMT - phone

## 2021-08-09 ENCOUNTER — MYC MEDICAL ADVICE (OUTPATIENT)
Dept: EDUCATION SERVICES | Facility: CLINIC | Age: 56
End: 2021-08-09

## 2021-08-12 ENCOUNTER — LAB (OUTPATIENT)
Dept: LAB | Facility: CLINIC | Age: 56
End: 2021-08-12
Payer: COMMERCIAL

## 2021-08-12 DIAGNOSIS — E11.9 TYPE 2 DIABETES MELLITUS (H): ICD-10-CM

## 2021-08-12 LAB — HBA1C MFR BLD: 7.5 % (ref 0–5.6)

## 2021-08-12 PROCEDURE — 83036 HEMOGLOBIN GLYCOSYLATED A1C: CPT

## 2021-08-12 PROCEDURE — 36415 COLL VENOUS BLD VENIPUNCTURE: CPT

## 2021-08-18 ENCOUNTER — VIRTUAL VISIT (OUTPATIENT)
Dept: EDUCATION SERVICES | Facility: CLINIC | Age: 56
End: 2021-08-18
Payer: COMMERCIAL

## 2021-08-18 DIAGNOSIS — E11.9 TYPE 2 DIABETES MELLITUS (H): Primary | ICD-10-CM

## 2021-08-18 PROCEDURE — G0108 DIAB MANAGE TRN  PER INDIV: HCPCS | Performed by: DIETITIAN, REGISTERED

## 2021-08-18 RX ORDER — INSULIN DEGLUDEC 100 U/ML
32 INJECTION, SOLUTION SUBCUTANEOUS DAILY
Qty: 45 ML | Refills: 3 | Status: SHIPPED | OUTPATIENT
Start: 2021-08-18 | End: 2021-11-22

## 2021-08-18 NOTE — LETTER
"    8/18/2021         RE: Josefina Damico  [address] [facility] [address]        Dear Colleague,    Thank you for referring your patient, Josefina Damico, to the [facility]. Please see a copy of my visit note below.    Diabetes Self-Management Education & Support    Presents for: Individual review    SUBJECTIVE/OBJECTIVE:  Presents for: Individual review  Accompanied by: Self  Diabetes education in the past 24mo: Yes  Focus of Visit: Taking Medication, Monitoring, Healthy Eating, Being Active  Diabetes type: Type 2  Disease course: Improving  Transportation concerns: No  Cultural Influences/Ethnic Background:  Not  or     Diabetes Symptoms & Complications:  Fatigue: Sometimes- improving  Weight trend: Stable  Complications assessed today?: Yes    Patient Problem List and Family Medical History reviewed for relevant medical history, current medical status, and diabetes risk factors.    Vitals:  There were no vitals taken for this visit.  Estimated body mass index is 36.52 kg/m  as calculated from the following:    Height as of 1/30/20: 1.549 m (5' 1\").    Weight as of 5/7/21: 87.7 kg (193 lb 4.8 oz).   Last 3 BP:   BP Readings from Last 3 Encounters:   05/07/21 120/60   10/26/20 120/70   02/25/20 132/85       History   Smoking Status     Never Smoker   Smokeless Tobacco     Never Used       Labs:  Lab Results   Component Value Date    A1C 7.5 08/12/2021     Lab Results   Component Value Date     10/26/2020     Lab Results   Component Value Date     10/26/2020     Direct Measure HDL   Date Value Ref Range Status   10/26/2020 41 (L) >=50 mg/dL Final   ]  GFR Estimate   Date Value Ref Range Status   10/26/2020 >60 >60 mL/min/1.73m2 Final     GFR Estimate If Black   Date Value Ref Range Status   10/26/2020 >60 >60 mL/min/1.73m2 Final     Lab Results   Component Value Date    CR 0.80 10/26/2020     No results found for: MICROALBUMIN    Healthy Eating:  Healthy " Eating Assessed Today: Yes (Familiar with Weight Watchers)  Cultural/Lutheran diet restrictions?: No  Meal planning/habits: Low carb, Heart healthy, Low salt, Avoiding sweets  How many times a week on average do you eat food made away from home (restaurant/take-out)?: 2  Breakfast: Glucerna, likes vanilla  or other type protein shake  Lunch: salad, yogurt,       salad:  spinach, cucumbers, likes raw veges,  meat  Dinner: orange chicken, rice, salad   last night:  portk chop, sm rice, vegetable-   eat about 7 pm  Snacks: occ fruit  Beverages: Water  Has patient met with a dietitian in the past?: Yes    Being Active:  Being Active Assessed Today: Yes  Exercise:: Yes (wears boot- improving can do 5,000 daily steps now)  Days per week of moderate to strenuous exercise (like a brisk walk): 3  On average, minutes per day of exercise at this level: 10  How intense was your typical exercise? : Light (like stretching or slow walking)  Exercise Minutes per Week: 30  Barrier to exercise: Physical limitation    Monitoring:  Monitoring Assessed Today: Yes (may be interested in doing pro Freestyle if next Alc >7%)  Times checking blood sugar at home (number): 2  Times checking blood sugar at home (per): Day  Blood glucose trend: Decreasing (FBS  125, 117, 133, 132, 164, 161, 138      2 hour post <180 mg/dl)        Taking Medications:  Diabetes Medication(s)     Biguanides       metFORMIN (GLUCOPHAGE) 500 MG tablet    [METFORMIN (GLUCOPHAGE) 500 MG TABLET] TAKE 2 TABLETS BY MOUTH TWICE A DAY WITH FOOD    Insulin       insulin degludec (TRESIBA FLEXTOUCH) 100 UNIT/ML pen    Inject 32 Units Subcutaneous daily     insulin degludec (TRESIBA FLEXTOUCH U-100) 100 unit/mL injection pen    [INSULIN DEGLUDEC (TRESIBA FLEXTOUCH U-100) 100 UNIT/ML INJECTION PEN] Inject 10 Units under the skin daily.          Taking Medication Assessed Today: Yes (currently taking 32 units Tresiba)  Current Treatments: Insulin Injections, Oral Medication  (taken by mouth)  Problems taking diabetes medications regularly?: No  Diabetes medication side effects?: No    Problem Solving:  Problem Solving Assessed Today: Yes  Is the patient at risk for hypoglycemia?: Yes  Hypoglycemia Frequency: Never    Hypoglycemia symptoms  Confusion:  (reviewed symptoms and treatment)    Hypoglycemia Complications  Blackouts: No    Reducing Risks:  Reducing Risks Assessed Today: Yes    Healthy Coping:  Healthy Coping Assessed Today: Yes (notes higher stress as spouse is recovering from surgery)  Stage of change: ACTION (Actively working towards change)  Patient Activation Measure Survey Score:  No flowsheet data found.    Diabetes knowledge and skills assessment:   Patient is knowledgeable in diabetes management concepts related to: Healthy Eating, Being Active, Monitoring and Taking Medication    Patient needs further education on the following diabetes management concepts: reducing risks    Based on learning assessment above, most appropriate setting for further diabetes education would be: Individual setting.      INTERVENTIONS:    Education provided today on:  AADE Self-Care Behaviors:  Taking Medication: drawing up, administering and storing injectable diabetes medications  Problem Solving: low blood glucose - causes, signs/symptoms, treatment and prevention    Opportunities for ongoing education and support in diabetes-self management were discussed.    Pt verbalized understanding of concepts discussed and recommendations provided today.       Education Materials Provided:  Living Healthy with Diabetes      ASSESSMENT:  Oh is doing very well.   Adjusting Tresiba appropriately, will add 2 units every 3 days if FBS > 130 mg/dl.   Has healthy diet,  Good appetite.   Will follow up in November.  If alc >7% at that time consider pro Freestyle kimi cgs.      Goals Addressed as of 8/18/2021 at 9:48 AM                    Today       Exercise 3x per week (30 min per time)   On track     Added 7/28/21 by Brenda Newsome RD      Try to use stretchy bands a couple time per week.         Increase physical activity       Added 8/18/21 by Brenda Newsome RD      Increase steps per day as able.  Goal 5,000 steps         Medication 1 (pt-stated)   On track    Added 7/28/21 by Brenda Newsome RD      Increase Tresiba as directed            Patient's most recent   Lab Results   Component Value Date    A1C 7.5 08/12/2021    is not meeting goal of <7.0    PLAN  See Patient Instructions for co-developed, patient-stated behavior change goals.    Time Spent: 30 minutes  Encounter Type: Individual    Any diabetes medication dose changes were made via the CDE Protocol and Collaborative Practice Agreement with the patient's primary care provider. A copy of this encounter was shared with the provider.

## 2021-08-18 NOTE — PROGRESS NOTES
"Diabetes Self-Management Education & Support    Presents for: Individual review    SUBJECTIVE/OBJECTIVE:  Presents for: Individual review  Accompanied by: Self  Diabetes education in the past 24mo: Yes  Focus of Visit: Taking Medication, Monitoring, Healthy Eating, Being Active  Diabetes type: Type 2  Disease course: Improving  Transportation concerns: No  Cultural Influences/Ethnic Background:  Not  or     Diabetes Symptoms & Complications:  Fatigue: Sometimes- improving  Weight trend: Stable  Complications assessed today?: Yes    Patient Problem List and Family Medical History reviewed for relevant medical history, current medical status, and diabetes risk factors.    Vitals:  There were no vitals taken for this visit.  Estimated body mass index is 36.52 kg/m  as calculated from the following:    Height as of 1/30/20: 1.549 m (5' 1\").    Weight as of 5/7/21: 87.7 kg (193 lb 4.8 oz).   Last 3 BP:   BP Readings from Last 3 Encounters:   05/07/21 120/60   10/26/20 120/70   02/25/20 132/85       History   Smoking Status    Never Smoker   Smokeless Tobacco    Never Used       Labs:  Lab Results   Component Value Date    A1C 7.5 08/12/2021     Lab Results   Component Value Date     10/26/2020     Lab Results   Component Value Date     10/26/2020     Direct Measure HDL   Date Value Ref Range Status   10/26/2020 41 (L) >=50 mg/dL Final   ]  GFR Estimate   Date Value Ref Range Status   10/26/2020 >60 >60 mL/min/1.73m2 Final     GFR Estimate If Black   Date Value Ref Range Status   10/26/2020 >60 >60 mL/min/1.73m2 Final     Lab Results   Component Value Date    CR 0.80 10/26/2020     No results found for: MICROALBUMIN    Healthy Eating:  Healthy Eating Assessed Today: Yes (Familiar with Weight Watchers)  Cultural/Roman Catholic diet restrictions?: No  Meal planning/habits: Low carb, Heart healthy, Low salt, Avoiding sweets  How many times a week on average do you eat food made away from home " (restaurant/take-out)?: 2  Breakfast: Glucerna, likes vanilla  or other type protein shake  Lunch: salad, yogurt,       salad:  spinach, cucumbers, likes raw veges,  meat  Dinner: orange chicken, rice, salad   last night:  portk chop, sm rice, vegetable-   eat about 7 pm  Snacks: occ fruit  Beverages: Water  Has patient met with a dietitian in the past?: Yes    Being Active:  Being Active Assessed Today: Yes  Exercise:: Yes (wears boot- improving can do 5,000 daily steps now)  Days per week of moderate to strenuous exercise (like a brisk walk): 3  On average, minutes per day of exercise at this level: 10  How intense was your typical exercise? : Light (like stretching or slow walking)  Exercise Minutes per Week: 30  Barrier to exercise: Physical limitation    Monitoring:  Monitoring Assessed Today: Yes (may be interested in doing pro Freestyle if next Alc >7%)  Times checking blood sugar at home (number): 2  Times checking blood sugar at home (per): Day  Blood glucose trend: Decreasing (FBS  125, 117, 133, 132, 164, 161, 138      2 hour post <180 mg/dl)        Taking Medications:  Diabetes Medication(s)       Biguanides       metFORMIN (GLUCOPHAGE) 500 MG tablet    [METFORMIN (GLUCOPHAGE) 500 MG TABLET] TAKE 2 TABLETS BY MOUTH TWICE A DAY WITH FOOD      Insulin       insulin degludec (TRESIBA FLEXTOUCH) 100 UNIT/ML pen    Inject 32 Units Subcutaneous daily     insulin degludec (TRESIBA FLEXTOUCH U-100) 100 unit/mL injection pen    [INSULIN DEGLUDEC (TRESIBA FLEXTOUCH U-100) 100 UNIT/ML INJECTION PEN] Inject 10 Units under the skin daily.            Taking Medication Assessed Today: Yes (currently taking 32 units Tresiba)  Current Treatments: Insulin Injections, Oral Medication (taken by mouth)  Problems taking diabetes medications regularly?: No  Diabetes medication side effects?: No    Problem Solving:  Problem Solving Assessed Today: Yes  Is the patient at risk for hypoglycemia?: Yes  Hypoglycemia Frequency:  Never    Hypoglycemia symptoms  Confusion:  (reviewed symptoms and treatment)    Hypoglycemia Complications  Blackouts: No    Reducing Risks:  Reducing Risks Assessed Today: Yes    Healthy Coping:  Healthy Coping Assessed Today: Yes (notes higher stress as spouse is recovering from surgery)  Stage of change: ACTION (Actively working towards change)  Patient Activation Measure Survey Score:  No flowsheet data found.    Diabetes knowledge and skills assessment:   Patient is knowledgeable in diabetes management concepts related to: Healthy Eating, Being Active, Monitoring and Taking Medication    Patient needs further education on the following diabetes management concepts: reducing risks    Based on learning assessment above, most appropriate setting for further diabetes education would be: Individual setting.      INTERVENTIONS:    Education provided today on:  AADE Self-Care Behaviors:  Taking Medication: drawing up, administering and storing injectable diabetes medications  Problem Solving: low blood glucose - causes, signs/symptoms, treatment and prevention    Opportunities for ongoing education and support in diabetes-self management were discussed.    Pt verbalized understanding of concepts discussed and recommendations provided today.       Education Materials Provided:  Living Healthy with Diabetes      ASSESSMENT:  Oh is doing very well.   Adjusting Tresiba appropriately, will add 2 units every 3 days if FBS > 130 mg/dl.   Has healthy diet,  Good appetite.   Will follow up in November.  If alc >7% at that time consider pro Freestyle kimi cgs.      Goals Addressed as of 8/18/2021 at 9:48 AM                      Today      Exercise 3x per week (30 min per time)   On track    Added 7/28/21 by Brenda Newsome RD      Try to use stretchy bands a couple time per week.        Increase physical activity       Added 8/18/21 by Brenda Newsome RD      Increase steps per day as able.  Goal 5,000  steps        Medication 1 (pt-stated)   On track    Added 7/28/21 by rBenda Newsome RD      Increase Tresiba as directed              Patient's most recent   Lab Results   Component Value Date    A1C 7.5 08/12/2021    is not meeting goal of <7.0    PLAN  See Patient Instructions for co-developed, patient-stated behavior change goals.    Time Spent: 30 minutes  Encounter Type: Individual    Any diabetes medication dose changes were made via the CDE Protocol and Collaborative Practice Agreement with the patient's primary care provider. A copy of this encounter was shared with the provider.

## 2021-08-23 ENCOUNTER — TELEPHONE (OUTPATIENT)
Dept: FAMILY MEDICINE | Facility: CLINIC | Age: 56
End: 2021-08-23

## 2021-08-30 DIAGNOSIS — E11.9 TYPE 2 DIABETES MELLITUS WITHOUT COMPLICATION, WITH LONG-TERM CURRENT USE OF INSULIN (H): Primary | ICD-10-CM

## 2021-08-30 DIAGNOSIS — E11.65 TYPE 2 DIABETES MELLITUS WITH HYPERGLYCEMIA (H): ICD-10-CM

## 2021-08-30 DIAGNOSIS — Z79.4 TYPE 2 DIABETES MELLITUS WITHOUT COMPLICATION, WITH LONG-TERM CURRENT USE OF INSULIN (H): Primary | ICD-10-CM

## 2021-08-31 NOTE — TELEPHONE ENCOUNTER
"Routing refill request to provider for review/approval because:  Drug not active on patient's medication list    Last Written Prescription:        Last office visit provider:  5/7/21     Requested Prescriptions   Pending Prescriptions Disp Refills     TRULICITY 3 MG/0.5ML SOPN [Pharmacy Med Name: TRULICITY 3 MG/0.5 ML PEN]  2     Sig: INJECT 3 MG UNDER THE SKIN EVERY 7 DAYS.       GLP-1 Agonists Protocol Failed - 8/30/2021  5:25 PM        Failed - Medication is active on med list        Failed - Recent (6 mo) or future (30 days) visit within the authorizing provider's specialty     Patient had office visit in the last 6 months or has a visit in the next 30 days with authorizing provider.  See \"Patient Info\" tab in inbasket, or \"Choose Columns\" in Meds & Orders section of the refill encounter.            Passed - HgbA1C in past 3 or 6 months     If HgbA1C is 8 or greater, it needs to be on file within the past 3 months.  If less than 8, must be on file within the past 6 months.     Recent Labs   Lab Test 08/12/21  0701   A1C 7.5*             Passed - Patient is age 18 or older        Passed - No active pregnancy on record        Passed - Normal serum creatinine on file in past 12 months     Recent Labs   Lab Test 10/26/20  0808   CR 0.80       Ok to refill medication if creatinine is low          Passed - No positive pregnancy test in past 12 months             Yoon Dong RN 08/30/21 9:11 PM  "

## 2021-09-01 ENCOUNTER — MYC MEDICAL ADVICE (OUTPATIENT)
Dept: EDUCATION SERVICES | Facility: CLINIC | Age: 56
End: 2021-09-01

## 2021-09-01 RX ORDER — DULAGLUTIDE 3 MG/.5ML
INJECTION, SOLUTION SUBCUTANEOUS
Qty: 2 ML | Refills: 2 | Status: SHIPPED | OUTPATIENT
Start: 2021-09-01 | End: 2024-01-04

## 2021-10-10 DIAGNOSIS — E11.65 TYPE 2 DIABETES MELLITUS WITH HYPERGLYCEMIA, WITHOUT LONG-TERM CURRENT USE OF INSULIN (H): ICD-10-CM

## 2021-10-11 ENCOUNTER — HEALTH MAINTENANCE LETTER (OUTPATIENT)
Age: 56
End: 2021-10-11

## 2021-10-11 RX ORDER — BLOOD SUGAR DIAGNOSTIC
STRIP MISCELLANEOUS
Qty: 100 STRIP | Refills: 3 | Status: SHIPPED | OUTPATIENT
Start: 2021-10-11 | End: 2022-10-03

## 2021-10-11 NOTE — TELEPHONE ENCOUNTER
"Routing refill request to provider for review/approval because:  Patient needs to be seen because:  Due for office visit     Last Written Prescription Date:  10/28/2020  Last Fill Quantity: 100,  # refills: 3   Last office visit provider:  6/28/21    Requested Prescriptions   Pending Prescriptions Disp Refills     ACCU-CHEK GUIDE test strip [Pharmacy Med Name: ACCU-CHEK GUIDE TEST STRIP] 100 strip 3     Sig: USE 1 EACH AS DIRECTED DAILY. DISPENSE BRAND PER PATIENT'S INSURANCE AT PHARMACY DISCRETION.       Diabetic Supplies Protocol Failed - 10/10/2021  7:37 AM        Failed - Recent (6 mo) or future (30 days) visit within the authorizing provider's specialty     Patient had office visit in the last 6 months or has a visit in the next 30 days with authorizing provider.  See \"Patient Info\" tab in inbasket, or \"Choose Columns\" in Meds & Orders section of the refill encounter.            Passed - Medication is active on med list        Passed - Patient is 18 years of age or older             May Pascual RN 10/11/21 10:29 AM  "

## 2021-10-12 DIAGNOSIS — E11.65 TYPE 2 DIABETES MELLITUS WITH HYPERGLYCEMIA (H): ICD-10-CM

## 2021-10-13 RX ORDER — LANCETS
EACH MISCELLANEOUS
Qty: 100 EACH | Refills: 0 | Status: SHIPPED | OUTPATIENT
Start: 2021-10-13 | End: 2022-03-28

## 2021-10-13 NOTE — TELEPHONE ENCOUNTER
"Last Written Prescription Date:  10/28/20  Last Fill Quantity: 100,  # refills: 3   Last office visit provider:  5/7/21     Requested Prescriptions   Pending Prescriptions Disp Refills     blood glucose monitoring (ACCU-CHEK FASTCLIX) lancets [Pharmacy Med Name: ACCU-CHEK FASTCLIX LANCET DRUM] 102 each 3     Sig: USE 1 EACH AS DIRECTED DAILY. DISPENSE BRAND PER PATIENT'S INSURANCE AT PHARMACY DISCRETION.       Diabetic Supplies Protocol Failed - 10/12/2021  5:53 PM        Failed - Medication is active on med list        Failed - Recent (6 mo) or future (30 days) visit within the authorizing provider's specialty     Patient had office visit in the last 6 months or has a visit in the next 30 days with authorizing provider.  See \"Patient Info\" tab in inbasket, or \"Choose Columns\" in Meds & Orders section of the refill encounter.            Passed - Patient is 18 years of age or older             Tyrone Gan RN 10/13/21 2:27 PM  "

## 2021-11-12 ENCOUNTER — IMMUNIZATION (OUTPATIENT)
Dept: NURSING | Facility: CLINIC | Age: 56
End: 2021-11-12
Payer: COMMERCIAL

## 2021-11-12 PROCEDURE — 0004A PR COVID VAC PFIZER DIL RECON 30 MCG/0.3 ML IM: CPT

## 2021-11-12 PROCEDURE — 91300 PR COVID VAC PFIZER DIL RECON 30 MCG/0.3 ML IM: CPT

## 2021-11-15 ENCOUNTER — LAB (OUTPATIENT)
Dept: LAB | Facility: CLINIC | Age: 56
End: 2021-11-15
Payer: COMMERCIAL

## 2021-11-15 DIAGNOSIS — E11.9 TYPE 2 DIABETES MELLITUS (H): ICD-10-CM

## 2021-11-15 LAB — HBA1C MFR BLD: 7.1 % (ref 0–5.6)

## 2021-11-15 PROCEDURE — 83036 HEMOGLOBIN GLYCOSYLATED A1C: CPT

## 2021-11-15 PROCEDURE — 36415 COLL VENOUS BLD VENIPUNCTURE: CPT

## 2021-11-22 ENCOUNTER — VIRTUAL VISIT (OUTPATIENT)
Dept: EDUCATION SERVICES | Facility: CLINIC | Age: 56
End: 2021-11-22
Payer: COMMERCIAL

## 2021-11-22 DIAGNOSIS — E11.9 TYPE 2 DIABETES MELLITUS (H): ICD-10-CM

## 2021-11-22 PROCEDURE — G0108 DIAB MANAGE TRN  PER INDIV: HCPCS | Mod: 95 | Performed by: DIETITIAN, REGISTERED

## 2021-11-22 RX ORDER — DULAGLUTIDE 3 MG/.5ML
3 INJECTION, SOLUTION SUBCUTANEOUS
Qty: 6 ML | Refills: 1 | Status: SHIPPED | OUTPATIENT
Start: 2021-11-22 | End: 2022-02-04

## 2021-11-22 RX ORDER — INSULIN DEGLUDEC 100 U/ML
44 INJECTION, SOLUTION SUBCUTANEOUS DAILY
Qty: 45 ML | Refills: 3 | Status: SHIPPED | OUTPATIENT
Start: 2021-11-22 | End: 2022-02-04

## 2021-11-22 NOTE — LETTER
"    11/22/2021         RE: Josefina Damico  [address] [place] [address]        Dear Colleague,    Thank you for referring your patient, Josefina Damico, to the [facility]. Please see a copy of my visit note below.    Diabetes Self-Management Education & Support    Presents for: Individual review    SUBJECTIVE/OBJECTIVE:  Presents for: Individual review  Accompanied by: Self  Diabetes education in the past 24mo: Yes  Focus of Visit: Taking Medication,Monitoring,Healthy Eating,Being Active  Diabetes type: Type 2  Disease course: Improving  Transportation concerns: No  Cultural Influences/Ethnic Background:  Not  or       Diabetes Symptoms & Complications:  Fatigue: Sometimes  Weight trend: Increasing (as reported by patient)  Complications assessed today?: Yes  Autonomic neuropathy: No    Patient Problem List and Family Medical History reviewed for relevant medical history, current medical status, and diabetes risk factors.    Vitals:  There were no vitals taken for this visit.  Estimated body mass index is 36.52 kg/m  as calculated from the following:    Height as of 1/30/20: 1.549 m (5' 1\").    Weight as of 5/7/21: 87.7 kg (193 lb 4.8 oz).   Last 3 BP:   BP Readings from Last 3 Encounters:   05/07/21 120/60   10/26/20 120/70   02/25/20 132/85       History   Smoking Status     Never Smoker   Smokeless Tobacco     Never Used       Labs:  Lab Results   Component Value Date    A1C 7.1 11/15/2021     Lab Results   Component Value Date     10/26/2020     Lab Results   Component Value Date     10/26/2020     Direct Measure HDL   Date Value Ref Range Status   10/26/2020 41 (L) >=50 mg/dL Final   ]  GFR Estimate   Date Value Ref Range Status   10/26/2020 >60 >60 mL/min/1.73m2 Final     GFR Estimate If Black   Date Value Ref Range Status   10/26/2020 >60 >60 mL/min/1.73m2 Final     Lab Results   Component Value Date    CR 0.80 10/26/2020     No results found for: " MICROALBUMIN    Healthy Eating:  Healthy Eating Assessed Today: Yes (Familiar with Weight Watchers)  Cultural/Judaism diet restrictions?: No  Meal planning/habits: Low carb,Heart healthy,Low salt,Avoiding sweets,Smaller portions  How many times a week on average do you eat food made away from home (restaurant/take-out)?: 2  Breakfast: Glucerna, likes vanilla  or other type protein shake  or protein powder/milk/fruit  Lunch: salad, yogurt,       salad:  spinach, cucumbers, likes raw veges,  meat  Dinner: orange chicken, rice, salad   last night:  portk chop, sm rice, vegetable-   eat about 7 pm  -    last night had lasagna/salad  Snacks: occ fruit, cheese  Beverages: Water  Has patient met with a dietitian in the past?: Yes    Being Active:  Being Active Assessed Today: Yes  Exercise:: Yes (reports her interest in increasing activty,)  Days per week of moderate to strenuous exercise (like a brisk walk): 2  On average, minutes per day of exercise at this level: 10  How intense was your typical exercise? : Light (like stretching or slow walking)  Exercise Minutes per Week: 20  Barrier to exercise: Physical limitation,None    Monitoring:  Monitoring Assessed Today: Yes (reports:  FBS  140-145 range,     average 30 day;  2 hour post meal < 180 mg/dl)  Times checking blood sugar at home (number): 2  Times checking blood sugar at home (per): Day      Taking Medications:  Diabetes Medication(s)     Biguanides       metFORMIN (GLUCOPHAGE) 500 MG tablet    [METFORMIN (GLUCOPHAGE) 500 MG TABLET] TAKE 2 TABLETS BY MOUTH TWICE A DAY WITH FOOD    Insulin       insulin degludec (TRESIBA FLEXTOUCH) 100 UNIT/ML pen    Inject 44 Units Subcutaneous daily     insulin degludec (TRESIBA FLEXTOUCH U-100) 100 unit/mL injection pen    [INSULIN DEGLUDEC (TRESIBA FLEXTOUCH U-100) 100 UNIT/ML INJECTION PEN] Inject 10 Units under the skin daily.    Incretin Mimetic Agents (GLP-1 Receptor Agonists)       Dulaglutide (TRULICITY) 3 MG/0.5ML SOPN     Inject 3 mg Subcutaneous every 7 days     TRULICITY 3 MG/0.5ML SOPN    INJECT 3 MG UNDER THE SKIN EVERY 7 DAYS.          Taking Medication Assessed Today: Yes (currently taking 40 units Tresiba)-- following protocol to increase to 44 units once daily.  Current Treatments: Insulin Injections,Oral Medication (taken by mouth),Non-insulin Injectables  Injection/Infusion sites: Abdomen,Thighs  Problems taking diabetes medications regularly?: No  Diabetes medication side effects?: No    Problem Solving:  Problem Solving Assessed Today: Yes  Is the patient at risk for hypoglycemia?: Yes  Hypoglycemia Frequency: Never    Hypoglycemia symptoms  Confusion:  (reviewed symptoms and treatment)    Hypoglycemia Complications  Blackouts: No    Reducing Risks:  Reducing Risks Assessed Today: Yes  Has dilated eye exam at least once a year?: Yes (had appt in September)  Sees dentist every 6 months?: Yes (has appt set up in January 2022)    Healthy Coping:  Healthy Coping Assessed Today: Yes (notes higher stress as spouse is recovering from surgery)  Stage of change: ACTION (Actively working towards change)  Patient Activation Measure Survey Score:  No flowsheet data found.    Diabetes knowledge and skills assessment:   Patient is knowledgeable in diabetes management concepts related to: Healthy Eating, Monitoring and Taking Medication    Patient needs further education on the following diabetes management concepts: Being Active    Based on learning assessment above, most appropriate setting for further diabetes education would be: Individual setting.      INTERVENTIONS:    Education provided today on:  AADE Self-Care Behaviors:  Healthy Eating: consistency in amount, composition, and timing of food intake  Being Active: describe appropriate activity program  Monitoring: individual blood glucose targets  Taking Medication: drawing up, administering and storing injectable diabetes medications, proper site selection and rotation for  injections and side effects of prescribed medications  Reducing Risks: prevention, early diagnostic measures and treatment of complications, foot care, appropriate dental care, annual eye exam, A1C - goals, relating to blood glucose levels, how often to check, lipids levels and goals and blood pressure and goals    Opportunities for ongoing education and support in diabetes-self management were discussed.    Pt verbalized understanding of concepts discussed and recommendations provided today.       ASSESSMENT:  Advised to follow up with PCP within next month.   Discussed diabetes ABC's, foot care, dental, skin, and eye care.     Encouraged her to increase activity.   Did increase Tresiba by 10% today as  range    Goals Addressed as of 11/22/2021 at 9:16 AM                    Today    8/18/21       Exercise 3x per week (30 min per time)   Not on track  On track    Added 7/28/21 by Brenda Newsome RD      Try to use stretchy bands a couple time per week.         Increase physical activity   30%      Added 8/18/21 by Brenda Newsome RD      Increase steps per day as able.  Goal 5,000 steps         Medication 1 (pt-stated)   On track  On track    Added 7/28/21 by Brenda Newsome RD      Increase Tresiba as directed            Patient's most recent   Lab Results   Component Value Date    A1C 7.1 11/15/2021    is not meeting goal of <7.0    PLAN  See Patient Instructions for co-developed, patient-stated behavior change goals.      Time Spent: 30 minutes - telephone  Encounter Type: Individual    Any diabetes medication dose changes were made via the CDE Protocol and Collaborative Practice Agreement with the patient's primary care provider. A copy of this encounter was shared with the provider.

## 2021-11-22 NOTE — PROGRESS NOTES
"Diabetes Self-Management Education & Support    Presents for: Individual review    SUBJECTIVE/OBJECTIVE:  Presents for: Individual review  Accompanied by: Self  Diabetes education in the past 24mo: Yes  Focus of Visit: Taking Medication,Monitoring,Healthy Eating,Being Active  Diabetes type: Type 2  Disease course: Improving  Transportation concerns: No  Cultural Influences/Ethnic Background:  Not  or       Diabetes Symptoms & Complications:  Fatigue: Sometimes  Weight trend: Increasing (as reported by patient)  Complications assessed today?: Yes  Autonomic neuropathy: No    Patient Problem List and Family Medical History reviewed for relevant medical history, current medical status, and diabetes risk factors.    Vitals:  There were no vitals taken for this visit.  Estimated body mass index is 36.52 kg/m  as calculated from the following:    Height as of 1/30/20: 1.549 m (5' 1\").    Weight as of 5/7/21: 87.7 kg (193 lb 4.8 oz).   Last 3 BP:   BP Readings from Last 3 Encounters:   05/07/21 120/60   10/26/20 120/70   02/25/20 132/85       History   Smoking Status    Never Smoker   Smokeless Tobacco    Never Used       Labs:  Lab Results   Component Value Date    A1C 7.1 11/15/2021     Lab Results   Component Value Date     10/26/2020     Lab Results   Component Value Date     10/26/2020     Direct Measure HDL   Date Value Ref Range Status   10/26/2020 41 (L) >=50 mg/dL Final   ]  GFR Estimate   Date Value Ref Range Status   10/26/2020 >60 >60 mL/min/1.73m2 Final     GFR Estimate If Black   Date Value Ref Range Status   10/26/2020 >60 >60 mL/min/1.73m2 Final     Lab Results   Component Value Date    CR 0.80 10/26/2020     No results found for: MICROALBUMIN    Healthy Eating:  Healthy Eating Assessed Today: Yes (Familiar with Weight Watchers)  Cultural/Pentecostal diet restrictions?: No  Meal planning/habits: Low carb,Heart healthy,Low salt,Avoiding sweets,Smaller portions  How many times a week " on average do you eat food made away from home (restaurant/take-out)?: 2  Breakfast: Glucerna, likes vanilla  or other type protein shake  or protein powder/milk/fruit  Lunch: salad, yogurt,       salad:  spinach, cucumbers, likes raw veges,  meat  Dinner: orange chicken, rice, salad   last night:  portk chop, sm rice, vegetable-   eat about 7 pm  -    last night had lasagna/salad  Snacks: occ fruit, cheese  Beverages: Water  Has patient met with a dietitian in the past?: Yes    Being Active:  Being Active Assessed Today: Yes  Exercise:: Yes (reports her interest in increasing activty,)  Days per week of moderate to strenuous exercise (like a brisk walk): 2  On average, minutes per day of exercise at this level: 10  How intense was your typical exercise? : Light (like stretching or slow walking)  Exercise Minutes per Week: 20  Barrier to exercise: Physical limitation,None    Monitoring:  Monitoring Assessed Today: Yes (reports:  FBS  140-145 range,     average 30 day;  2 hour post meal < 180 mg/dl)  Times checking blood sugar at home (number): 2  Times checking blood sugar at home (per): Day      Taking Medications:  Diabetes Medication(s)       Biguanides       metFORMIN (GLUCOPHAGE) 500 MG tablet    [METFORMIN (GLUCOPHAGE) 500 MG TABLET] TAKE 2 TABLETS BY MOUTH TWICE A DAY WITH FOOD      Insulin       insulin degludec (TRESIBA FLEXTOUCH) 100 UNIT/ML pen    Inject 44 Units Subcutaneous daily     insulin degludec (TRESIBA FLEXTOUCH U-100) 100 unit/mL injection pen    [INSULIN DEGLUDEC (TRESIBA FLEXTOUCH U-100) 100 UNIT/ML INJECTION PEN] Inject 10 Units under the skin daily.      Incretin Mimetic Agents (GLP-1 Receptor Agonists)       Dulaglutide (TRULICITY) 3 MG/0.5ML SOPN    Inject 3 mg Subcutaneous every 7 days     TRULICITY 3 MG/0.5ML SOPN    INJECT 3 MG UNDER THE SKIN EVERY 7 DAYS.            Taking Medication Assessed Today: Yes (currently taking 40 units Tresiba)-- following protocol to increase to 44 units  once daily.  Current Treatments: Insulin Injections,Oral Medication (taken by mouth),Non-insulin Injectables  Injection/Infusion sites: Abdomen,Thighs  Problems taking diabetes medications regularly?: No  Diabetes medication side effects?: No    Problem Solving:  Problem Solving Assessed Today: Yes  Is the patient at risk for hypoglycemia?: Yes  Hypoglycemia Frequency: Never    Hypoglycemia symptoms  Confusion:  (reviewed symptoms and treatment)    Hypoglycemia Complications  Blackouts: No    Reducing Risks:  Reducing Risks Assessed Today: Yes  Has dilated eye exam at least once a year?: Yes (had appt in September)  Sees dentist every 6 months?: Yes (has appt set up in January 2022)    Healthy Coping:  Healthy Coping Assessed Today: Yes (notes higher stress as spouse is recovering from surgery)  Stage of change: ACTION (Actively working towards change)  Patient Activation Measure Survey Score:  No flowsheet data found.    Diabetes knowledge and skills assessment:   Patient is knowledgeable in diabetes management concepts related to: Healthy Eating, Monitoring and Taking Medication    Patient needs further education on the following diabetes management concepts: Being Active    Based on learning assessment above, most appropriate setting for further diabetes education would be: Individual setting.      INTERVENTIONS:    Education provided today on:  AADE Self-Care Behaviors:  Healthy Eating: consistency in amount, composition, and timing of food intake  Being Active: describe appropriate activity program  Monitoring: individual blood glucose targets  Taking Medication: drawing up, administering and storing injectable diabetes medications, proper site selection and rotation for injections and side effects of prescribed medications  Reducing Risks: prevention, early diagnostic measures and treatment of complications, foot care, appropriate dental care, annual eye exam, A1C - goals, relating to blood glucose levels, how  often to check, lipids levels and goals and blood pressure and goals    Opportunities for ongoing education and support in diabetes-self management were discussed.    Pt verbalized understanding of concepts discussed and recommendations provided today.       ASSESSMENT:  Advised to follow up with PCP within next month.   Discussed diabetes ABC's, foot care, dental, skin, and eye care.     Encouraged her to increase activity.   Did increase Tresiba by 10% today as  range    Goals Addressed as of 11/22/2021 at 9:16 AM                      Today    8/18/21      Exercise 3x per week (30 min per time)   Not on track  On track    Added 7/28/21 by Brenda Newsome RD      Try to use stretchy bands a couple time per week.        Increase physical activity   30%      Added 8/18/21 by Brenda Newsome RD      Increase steps per day as able.  Goal 5,000 steps        Medication 1 (pt-stated)   On track  On track    Added 7/28/21 by Brenda Newsome RD      Increase Tresiba as directed              Patient's most recent   Lab Results   Component Value Date    A1C 7.1 11/15/2021    is not meeting goal of <7.0    PLAN  See Patient Instructions for co-developed, patient-stated behavior change goals.      Time Spent: 30 minutes - telephone  Encounter Type: Individual    Any diabetes medication dose changes were made via the CDE Protocol and Collaborative Practice Agreement with the patient's primary care provider. A copy of this encounter was shared with the provider.

## 2021-11-26 DIAGNOSIS — R60.9 EDEMA, UNSPECIFIED TYPE: ICD-10-CM

## 2021-11-26 RX ORDER — HYDROCHLOROTHIAZIDE 25 MG/1
TABLET ORAL
Qty: 90 TABLET | Refills: 1 | Status: SHIPPED | OUTPATIENT
Start: 2021-11-26 | End: 2022-02-04

## 2021-11-26 NOTE — TELEPHONE ENCOUNTER
"Routing refill request to provider for review/approval because:  Labs not current:  Multiple    Last Written Prescription Date:  1/31/21  Last Fill Quantity: 90,  # refills: 2   Last office visit provider:  5/7/21     Requested Prescriptions   Pending Prescriptions Disp Refills     hydrochlorothiazide (HYDRODIURIL) 25 MG tablet [Pharmacy Med Name: HYDROCHLOROTHIAZIDE 25 MG TAB] 90 tablet 2     Sig: TAKE 1 TABLET BY MOUTH EVERY DAY       Diuretics (Including Combos) Protocol Failed - 11/26/2021 12:26 AM        Failed - Normal serum creatinine on file in past 12 months     Recent Labs   Lab Test 10/26/20  0808   CR 0.80              Failed - Normal serum potassium on file in past 12 months     Recent Labs   Lab Test 10/26/20  0808   POTASSIUM 4.3                    Failed - Normal serum sodium on file in past 12 months     Recent Labs   Lab Test 10/26/20  0808                 Passed - Blood pressure under 140/90 in past 12 months     BP Readings from Last 3 Encounters:   05/07/21 120/60   10/26/20 120/70   02/25/20 132/85                 Passed - Recent (12 mo) or future (30 days) visit within the authorizing provider's specialty     Patient has had an office visit with the authorizing provider or a provider within the authorizing providers department within the previous 12 mos or has a future within next 30 days. See \"Patient Info\" tab in inbasket, or \"Choose Columns\" in Meds & Orders section of the refill encounter.              Passed - Medication is active on med list        Passed - Patient is age 18 or older        Passed - No active pregancy on record        Passed - No positive pregnancy test in past 12 months             Tyrone Gan RN 11/26/21 11:39 AM  "

## 2021-11-30 ENCOUNTER — OFFICE VISIT (OUTPATIENT)
Dept: FAMILY MEDICINE | Facility: CLINIC | Age: 56
End: 2021-11-30
Payer: COMMERCIAL

## 2021-11-30 VITALS
SYSTOLIC BLOOD PRESSURE: 120 MMHG | HEART RATE: 88 BPM | WEIGHT: 200.8 LBS | DIASTOLIC BLOOD PRESSURE: 70 MMHG | BODY MASS INDEX: 37.94 KG/M2

## 2021-11-30 DIAGNOSIS — M79.671 RIGHT FOOT PAIN: Primary | ICD-10-CM

## 2021-11-30 LAB — URATE SERPL-MCNC: 5.2 MG/DL (ref 2–7.5)

## 2021-11-30 PROCEDURE — 36415 COLL VENOUS BLD VENIPUNCTURE: CPT | Performed by: NURSE PRACTITIONER

## 2021-11-30 PROCEDURE — 84550 ASSAY OF BLOOD/URIC ACID: CPT | Performed by: NURSE PRACTITIONER

## 2021-11-30 PROCEDURE — 90471 IMMUNIZATION ADMIN: CPT | Performed by: NURSE PRACTITIONER

## 2021-11-30 PROCEDURE — 90682 RIV4 VACC RECOMBINANT DNA IM: CPT | Performed by: NURSE PRACTITIONER

## 2021-11-30 PROCEDURE — 99213 OFFICE O/P EST LOW 20 MIN: CPT | Mod: 25 | Performed by: NURSE PRACTITIONER

## 2021-11-30 RX ORDER — BLOOD-GLUCOSE METER
1 EACH MISCELLANEOUS
COMMUNITY
Start: 2020-10-28

## 2021-11-30 NOTE — PROGRESS NOTES
Assessment and Plan:     Right foot pain  Differentials include fracture, sprain, gout.  X-ray shows no acute fracture.  Will have radiology review.  Will check uric acid to rule out gout.  If labs are normal, will refer to podiatry for further evaluation and treatment.  Discussed symptomatic treatment including rest, ice, elevation, acetaminophen as needed.  She is content with the plan.  - XR Foot Right G/E 3 Views  - Uric acid  - Uric acid        Subjective:     Josefina is a 56 year old female presenting to the clinic for concerns for right foot pain for over 1 week.  Patient complains of pain within the dorsal aspect of her midfoot.  This is worse with ambulation or moving her ankle.  Patient has been elevating and resting the area which provides assistance.  She denies any known injury.  She has not noticed any bruising, but has noticed some occasional redness and swelling.  She has not been taking any pain medications.  She has tried CBD oil from a chiropractor with minimal relief.  She denies history of gout.  Patient does have type 2 diabetes.  Last A1c was 7.1% on 11/15/2021.    Reviewof Systems: A complete 14 point review of systems was obtained and is negative or as stated in the history of present illness.    Social History     Socioeconomic History     Marital status:      Spouse name: Not on file     Number of children: Not on file     Years of education: Not on file     Highest education level: Not on file   Occupational History     Not on file   Tobacco Use     Smoking status: Never Smoker     Smokeless tobacco: Never Used   Substance and Sexual Activity     Alcohol use: Yes     Alcohol/week: 1.0 standard drink     Drug use: No     Sexual activity: Yes     Partners: Male     Comment:    Other Topics Concern     Not on file   Social History Narrative     Not on file     Social Determinants of Health     Financial Resource Strain: Not on file   Food Insecurity: Not on file   Transportation  Needs: Not on file   Physical Activity: Not on file   Stress: Not on file   Social Connections: Not on file   Intimate Partner Violence: Not on file   Housing Stability: Not on file       Active Ambulatory Problems     Diagnosis Date Noted     Type 2 diabetes mellitus (H) 10/24/2017     Hyperlipidemia, unspecified hyperlipidemia type 10/24/2017     Edema, unspecified type 10/24/2017     Morbid obesity (H) 08/23/2018     Incarcerated umbilical hernia 12/17/2019     Resolved Ambulatory Problems     Diagnosis Date Noted     No Resolved Ambulatory Problems     Past Medical History:   Diagnosis Date     Diabetes mellitus (H)      Environmental allergies      Hyperlipidemia        Family History   Problem Relation Age of Onset     Lung Cancer Mother      Diabetes Mother      Heart Disease Father      Chronic Obstructive Pulmonary Disease Father      Dementia Father      Parkinsonism Father      Hyperlipidemia Father      Diabetes Maternal Aunt      Diabetes Maternal Grandmother      Diabetes Paternal Grandfather        Objective:     /70 (BP Location: Right arm, Patient Position: Sitting, Cuff Size: Adult Large)   Pulse 88   Wt 91.1 kg (200 lb 12.8 oz)   BMI 37.94 kg/m      Patient is alert, in no obvious distress.   Skin: Warm, dry.    Musculoskeletal: She has full range of motion of her foot.  She is tender to palpation of the dorsal aspect of her midfoot.  There are no areas of erythema, ecchymosis, swelling.  She ambulates without difficulty.  Pedal pulses present and palpable.    LABORATORY: I ordered and personally reviewed right foot x-rays showing no obvious fracture.  Will have radiology review.

## 2022-01-24 DIAGNOSIS — E78.2 MIXED HYPERLIPIDEMIA: ICD-10-CM

## 2022-01-26 RX ORDER — FENOFIBRATE 145 MG/1
TABLET, COATED ORAL
Qty: 90 TABLET | Refills: 3 | Status: SHIPPED | OUTPATIENT
Start: 2022-01-26 | End: 2022-02-04

## 2022-02-04 ENCOUNTER — OFFICE VISIT (OUTPATIENT)
Dept: FAMILY MEDICINE | Facility: CLINIC | Age: 57
End: 2022-02-04
Payer: COMMERCIAL

## 2022-02-04 VITALS
WEIGHT: 204 LBS | HEART RATE: 80 BPM | DIASTOLIC BLOOD PRESSURE: 74 MMHG | BODY MASS INDEX: 38.51 KG/M2 | SYSTOLIC BLOOD PRESSURE: 120 MMHG | HEIGHT: 61 IN

## 2022-02-04 DIAGNOSIS — E11.65 TYPE 2 DIABETES MELLITUS WITH HYPERGLYCEMIA, WITHOUT LONG-TERM CURRENT USE OF INSULIN (H): ICD-10-CM

## 2022-02-04 DIAGNOSIS — E66.01 MORBID OBESITY (H): ICD-10-CM

## 2022-02-04 DIAGNOSIS — Z11.59 NEED FOR HEPATITIS C SCREENING TEST: ICD-10-CM

## 2022-02-04 DIAGNOSIS — R82.90 ABNORMAL URINALYSIS: Primary | ICD-10-CM

## 2022-02-04 DIAGNOSIS — R82.90 ABNORMAL URINALYSIS: ICD-10-CM

## 2022-02-04 DIAGNOSIS — E78.2 MIXED HYPERLIPIDEMIA: ICD-10-CM

## 2022-02-04 DIAGNOSIS — Z00.00 ENCOUNTER FOR ROUTINE HISTORY AND PHYSICAL EXAM IN FEMALE: Primary | ICD-10-CM

## 2022-02-04 DIAGNOSIS — Z12.31 ENCOUNTER FOR SCREENING MAMMOGRAM FOR BREAST CANCER: ICD-10-CM

## 2022-02-04 DIAGNOSIS — R60.9 EDEMA, UNSPECIFIED TYPE: ICD-10-CM

## 2022-02-04 DIAGNOSIS — Z79.899 MEDICATION MANAGEMENT: ICD-10-CM

## 2022-02-04 LAB
ALBUMIN SERPL-MCNC: 3.9 G/DL (ref 3.5–5)
ALBUMIN UR-MCNC: ABNORMAL MG/DL
ALP SERPL-CCNC: 61 U/L (ref 45–120)
ALT SERPL W P-5'-P-CCNC: 18 U/L (ref 0–45)
ANION GAP SERPL CALCULATED.3IONS-SCNC: 10 MMOL/L (ref 5–18)
APPEARANCE UR: CLEAR
AST SERPL W P-5'-P-CCNC: 13 U/L (ref 0–40)
BACTERIA #/AREA URNS HPF: ABNORMAL /HPF
BILIRUB SERPL-MCNC: 0.4 MG/DL (ref 0–1)
BILIRUB UR QL STRIP: NEGATIVE
BUN SERPL-MCNC: 18 MG/DL (ref 8–22)
CALCIUM SERPL-MCNC: 10 MG/DL (ref 8.5–10.5)
CHLORIDE BLD-SCNC: 106 MMOL/L (ref 98–107)
CHOLEST SERPL-MCNC: 167 MG/DL
CO2 SERPL-SCNC: 24 MMOL/L (ref 22–31)
COLOR UR AUTO: YELLOW
CREAT SERPL-MCNC: 0.85 MG/DL (ref 0.6–1.1)
CREAT UR-MCNC: 256 MG/DL
ERYTHROCYTE [DISTWIDTH] IN BLOOD BY AUTOMATED COUNT: 13.1 % (ref 10–15)
FASTING STATUS PATIENT QL REPORTED: YES
GFR SERPL CREATININE-BSD FRML MDRD: 81 ML/MIN/1.73M2
GLUCOSE BLD-MCNC: 120 MG/DL (ref 70–125)
GLUCOSE UR STRIP-MCNC: NEGATIVE MG/DL
HCT VFR BLD AUTO: 38.3 % (ref 35–47)
HDLC SERPL-MCNC: 44 MG/DL
HGB BLD-MCNC: 13 G/DL (ref 11.7–15.7)
HGB UR QL STRIP: ABNORMAL
KETONES UR STRIP-MCNC: NEGATIVE MG/DL
LDLC SERPL CALC-MCNC: 96 MG/DL
LEUKOCYTE ESTERASE UR QL STRIP: ABNORMAL
MCH RBC QN AUTO: 29.7 PG (ref 26.5–33)
MCHC RBC AUTO-ENTMCNC: 33.9 G/DL (ref 31.5–36.5)
MCV RBC AUTO: 87 FL (ref 78–100)
MICROALBUMIN UR-MCNC: 1.43 MG/DL (ref 0–1.99)
MICROALBUMIN/CREAT UR: 5.6 MG/G CR
NITRATE UR QL: NEGATIVE
PH UR STRIP: 6.5 [PH] (ref 5–8)
PLATELET # BLD AUTO: 372 10E3/UL (ref 150–450)
POTASSIUM BLD-SCNC: 4.2 MMOL/L (ref 3.5–5)
PROT SERPL-MCNC: 7.4 G/DL (ref 6–8)
RBC # BLD AUTO: 4.38 10E6/UL (ref 3.8–5.2)
RBC #/AREA URNS AUTO: ABNORMAL /HPF
SODIUM SERPL-SCNC: 140 MMOL/L (ref 136–145)
SP GR UR STRIP: 1.02 (ref 1–1.03)
SQUAMOUS #/AREA URNS AUTO: ABNORMAL /LPF
TRANS CELLS #/AREA URNS HPF: ABNORMAL /HPF
TRIGL SERPL-MCNC: 136 MG/DL
UROBILINOGEN UR STRIP-ACNC: 0.2 E.U./DL
WBC # BLD AUTO: 7 10E3/UL (ref 4–11)
WBC #/AREA URNS AUTO: ABNORMAL /HPF

## 2022-02-04 PROCEDURE — 81001 URINALYSIS AUTO W/SCOPE: CPT | Performed by: NURSE PRACTITIONER

## 2022-02-04 PROCEDURE — 82043 UR ALBUMIN QUANTITATIVE: CPT | Performed by: NURSE PRACTITIONER

## 2022-02-04 PROCEDURE — 87624 HPV HI-RISK TYP POOLED RSLT: CPT | Performed by: NURSE PRACTITIONER

## 2022-02-04 PROCEDURE — 90732 PPSV23 VACC 2 YRS+ SUBQ/IM: CPT | Performed by: NURSE PRACTITIONER

## 2022-02-04 PROCEDURE — 80053 COMPREHEN METABOLIC PANEL: CPT | Performed by: NURSE PRACTITIONER

## 2022-02-04 PROCEDURE — G0123 SCREEN CERV/VAG THIN LAYER: HCPCS | Performed by: NURSE PRACTITIONER

## 2022-02-04 PROCEDURE — 99396 PREV VISIT EST AGE 40-64: CPT | Mod: 25 | Performed by: NURSE PRACTITIONER

## 2022-02-04 PROCEDURE — 36415 COLL VENOUS BLD VENIPUNCTURE: CPT | Performed by: NURSE PRACTITIONER

## 2022-02-04 PROCEDURE — 90471 IMMUNIZATION ADMIN: CPT | Performed by: NURSE PRACTITIONER

## 2022-02-04 PROCEDURE — 86803 HEPATITIS C AB TEST: CPT | Performed by: NURSE PRACTITIONER

## 2022-02-04 PROCEDURE — 85027 COMPLETE CBC AUTOMATED: CPT | Performed by: NURSE PRACTITIONER

## 2022-02-04 PROCEDURE — 80061 LIPID PANEL: CPT | Performed by: NURSE PRACTITIONER

## 2022-02-04 PROCEDURE — 87086 URINE CULTURE/COLONY COUNT: CPT | Performed by: NURSE PRACTITIONER

## 2022-02-04 PROCEDURE — 99214 OFFICE O/P EST MOD 30 MIN: CPT | Mod: 25 | Performed by: NURSE PRACTITIONER

## 2022-02-04 RX ORDER — FENOFIBRATE 145 MG/1
145 TABLET, COATED ORAL DAILY
Qty: 90 TABLET | Refills: 3 | Status: SHIPPED | OUTPATIENT
Start: 2022-02-04 | End: 2023-04-10

## 2022-02-04 RX ORDER — DULAGLUTIDE 3 MG/.5ML
3 INJECTION, SOLUTION SUBCUTANEOUS
Qty: 6 ML | Refills: 4 | Status: SHIPPED | OUTPATIENT
Start: 2022-02-04 | End: 2022-12-02

## 2022-02-04 RX ORDER — PRAVASTATIN SODIUM 80 MG/1
80 TABLET ORAL EVERY EVENING
Qty: 90 TABLET | Refills: 2 | Status: SHIPPED | OUTPATIENT
Start: 2022-02-04 | End: 2022-12-11

## 2022-02-04 RX ORDER — HYDROCHLOROTHIAZIDE 25 MG/1
25 TABLET ORAL DAILY
Qty: 90 TABLET | Refills: 3 | Status: SHIPPED | OUTPATIENT
Start: 2022-02-04 | End: 2022-12-02

## 2022-02-04 RX ORDER — INSULIN DEGLUDEC 100 U/ML
44 INJECTION, SOLUTION SUBCUTANEOUS DAILY
Qty: 45 ML | Refills: 3 | Status: SHIPPED | OUTPATIENT
Start: 2022-02-04 | End: 2022-12-02

## 2022-02-04 ASSESSMENT — MIFFLIN-ST. JEOR: SCORE: 1458.75

## 2022-02-04 NOTE — PROGRESS NOTES
Assessment and Plan:    Encounter for routine history and physical exam in female  Recommend consuming a healthy diet and exercising.  She declines shingles vaccine and HIV screening. Provided pneumococcal vaccine.   - CBC with platelets  - UA Macro with Reflex to Micro and Culture - lab collect  - Pap screen with HPV - recommended age 30 - 65 years  - PPSV23, IM/SUBQ (2+ YRS) - [id number]    Encounter for screening mammogram for breast cancer  - *MA Screening Digital Bilateral    Need for hepatitis C screening test  - Hepatitis C antibody    Medication management  - Comprehensive metabolic panel    Type 2 diabetes mellitus with hyperglycemia, without long-term current use of insulin (H)  Last A1c was 7.1%.  We will check A1c in May.  She continues her medications as prescribed.  - Albumin Random Urine Quantitative with Creat Ratio  - Dulaglutide (TRULICITY) 3 MG/0.5ML SOPN  Dispense: 6 mL; Refill: 4  - metFORMIN (GLUCOPHAGE) 500 MG tablet  Dispense: 360 tablet; Refill: 1  - insulin degludec (TRESIBA FLEXTOUCH) 100 UNIT/ML pen  Dispense: 45 mL; Refill: 3    Mixed hyperlipidemia  She continues pravastatin and fenofibrate.  Goal LDL is less than 100.  - Lipid panel reflex to direct LDL Fasting  - pravastatin (PRAVACHOL) 80 MG tablet  Dispense: 90 tablet; Refill: 2    Edema, unspecified type  This is controlled with hydrochlorothiazide.  - hydrochlorothiazide (HYDRODIURIL) 25 MG tablet  Dispense: 90 tablet; Refill: 3    Morbid obesity (H)  Recommend consuming a healthy diet and exercise.      Subjective:     Josefina is a 56 year old female presenting to the clinic for a female physical.     LMP: ablation 12 years ago   Hx of abnormal pap smear: none   Last pap smear: 10/2/17 normal, negative HPV   Perform self-breast exams: yes   Vaginal discharge or irritation:   Sexually active: yes,  for 15 years   Contraception: none   Concerns for STDs: none   Previous pregnancies:none     Patient has type 2 diabetes.  She  "is injecting Trulicity and Tresiba.  She is taking Metformin.  Last A1c was 7.1% in November.  She is up-to-date on her eye exam.  She denies any sores on her feet.  She is trying to consume a healthy diet.  She takes pravastatin and fenofibrate for hyperlipidemia.  She is fasting today.  Lower extremity edema is controlled with hydrochlorothiazide.  Her  was recently diagnosed with hepatitis C, so she requests screening today.    Review of systems:  I performed a 10 point review of systems.  All pertinent positives and negatives are noted in the HPI. All others are negative.     No Known Allergies    Current Outpatient Medications   Medication     ACCU-CHEK GUIDE test strip     aspirin 81 MG EC tablet     blood glucose monitoring (ACCU-CHEK FASTCLIX) lancets     Blood Glucose Monitoring Suppl (GLUCOCOM BLOOD GLUCOSE MONITOR) KEISHA     blood-glucose meter Misc     cholecalciferol, vitamin D3, (VITAMIN D3) 5,000 unit Tab     Dulaglutide (TRULICITY) 3 MG/0.5ML SOPN     fenofibrate (TRICOR) 145 MG tablet     generic lancets     hydrochlorothiazide (HYDRODIURIL) 25 MG tablet     insulin degludec (TRESIBA FLEXTOUCH U-100) 100 unit/mL injection pen     insulin degludec (TRESIBA FLEXTOUCH) 100 UNIT/ML pen     loratadine (CLARITIN) 10 mg tablet     metFORMIN (GLUCOPHAGE) 500 MG tablet     multivitamin therapeutic tablet     OMEGA-3/DHA/EPA/FISH OIL (FISH OIL-OMEGA-3 FATTY ACIDS) 300-1,000 mg capsule     pen needle, diabetic (BD ULTRA-FINE IVORY PEN NEEDLE) 32 gauge x 5/32\" Ndle     pravastatin (PRAVACHOL) 80 MG tablet     pravastatin (PRAVACHOL) 80 MG tablet     TRULICITY 3 MG/0.5ML SOPN     No current facility-administered medications for this visit.       Social History     Socioeconomic History     Marital status:      Spouse name: Not on file     Number of children: Not on file     Years of education: Not on file     Highest education level: Not on file   Occupational History     Not on file   Tobacco Use " "    Smoking status: Never Smoker     Smokeless tobacco: Never Used   Substance and Sexual Activity     Alcohol use: Yes     Alcohol/week: 1.0 standard drink     Drug use: No     Sexual activity: Yes     Partners: Male     Comment:    Other Topics Concern     Not on file   Social History Narrative     Not on file     Social Determinants of Health     Financial Resource Strain: Not on file   Food Insecurity: Not on file   Transportation Needs: Not on file   Physical Activity: Not on file   Stress: Not on file   Social Connections: Not on file   Intimate Partner Violence: Not on file   Housing Stability: Not on file       Past Medical History:   Diagnosis Date     Diabetes mellitus (H)      Environmental allergies      Hyperlipidemia        Family History   Problem Relation Age of Onset     Lung Cancer Mother      Diabetes Mother      Heart Disease Father      Chronic Obstructive Pulmonary Disease Father      Dementia Father      Parkinsonism Father      Hyperlipidemia Father      Diabetes Maternal Aunt      Diabetes Maternal Grandmother      Diabetes Paternal Grandfather        Past Surgical History:   Procedure Laterality Date     ENDOMETRIAL ABLATION       LAPAROSCOPIC HERNIORRHAPHY INCISIONAL N/A 1/16/2020    Procedure: HERNIORRHAPHY, UMBILICAL, LAPAROSCOPIC with mesh;  Surgeon: Kel Pittman MD;  Location: [facility];  Service: General       Objective:     /74 (BP Location: Right arm, Patient Position: Sitting, Cuff Size: Adult Regular)   Pulse 80   Ht 1.543 m (5' 0.75\")   Wt 92.5 kg (204 lb)   BMI 38.86 kg/m      Patient is alert, no obvious distress.   Skin: Warm, dry.  No rashes or lesions. Skin turgor rapid return.   HEENT:  Eyes normal.  Ears normal.  Nose patent, mucosa pink.  Oropharynx mucosa pink, no lesions or tonsil enlargement.   Neck:  Supple, without lymphadenopathy, bruits, JVD. Thyroid normal texture and size.    Lungs:  Clear to auscultation.  No wheezing, rales noted. "  Respirations even and unlabored.   Heart:  Regular rate and rhythm.  No murmurs.   Breasts:  Normal.  No surrounding adenopathy.   Abdomen: Soft, nontender.  No organomegaly.  Bowel sounds normoactive.  No guarding or masses noted.   :  External genitalia normal.  Normal vaginal mucosa.  Cervix no lesions or cervical motion tenderness.   Musculoskeletal:  Full ROM of extremities.    Neurological: No obvious gross or fine motor deficit is present.             Answers for HPI/ROS submitted by the patient on 2/2/2022  Frequency of exercise:: 2-3 days/week  Getting at least 3 servings of Calcium per day:: NO  Diet:: Low fat/cholesterol, Diabetic  Taking medications regularly:: Yes  Medication side effects:: None  Bi-annual eye exam:: Yes  Dental care twice a year:: Yes  Sleep apnea or symptoms of sleep apnea:: None  Additional concerns today:: No  Duration of exercise:: 15-30 minutes

## 2022-02-05 LAB — BACTERIA UR CULT: NORMAL

## 2022-02-06 LAB — HCV AB SERPL QL IA: NEGATIVE

## 2022-02-08 LAB
HUMAN PAPILLOMA VIRUS 16 DNA: NEGATIVE
HUMAN PAPILLOMA VIRUS 18 DNA: NEGATIVE
HUMAN PAPILLOMA VIRUS FINAL DIAGNOSIS: NORMAL
HUMAN PAPILLOMA VIRUS OTHER HR: NEGATIVE

## 2022-02-10 LAB
BKR LAB AP GYN ADEQUACY: NORMAL
BKR LAB AP GYN INTERPRETATION: NORMAL
BKR LAB AP HPV REFLEX: NORMAL
BKR LAB AP PREVIOUS ABNORMAL: NORMAL
PATH REPORT.COMMENTS IMP SPEC: NORMAL
PATH REPORT.COMMENTS IMP SPEC: NORMAL
PATH REPORT.RELEVANT HX SPEC: NORMAL

## 2022-03-11 ENCOUNTER — HOSPITAL ENCOUNTER (OUTPATIENT)
Dept: MAMMOGRAPHY | Facility: CLINIC | Age: 57
Discharge: HOME | End: 2022-03-11
Attending: NURSE PRACTITIONER | Admitting: NURSE PRACTITIONER
Payer: COMMERCIAL

## 2022-03-11 DIAGNOSIS — Z12.31 ENCOUNTER FOR SCREENING MAMMOGRAM FOR BREAST CANCER: ICD-10-CM

## 2022-03-11 PROCEDURE — 77067 SCR MAMMO BI INCL CAD: CPT

## 2022-03-25 DIAGNOSIS — E11.65 TYPE 2 DIABETES MELLITUS WITH HYPERGLYCEMIA (H): ICD-10-CM

## 2022-03-28 RX ORDER — LANCETS
EACH MISCELLANEOUS
Qty: 100 EACH | Refills: 1 | Status: SHIPPED | OUTPATIENT
Start: 2022-03-28 | End: 2022-09-25

## 2022-03-28 NOTE — TELEPHONE ENCOUNTER
"Last Written Prescription Date:  10/13/21  Last Fill Quantity: 100,  # refills: 0   Last office visit provider:  2/4/22     Requested Prescriptions   Pending Prescriptions Disp Refills     blood glucose monitoring (ACCU-CHEK FASTCLIX) lancets [Pharmacy Med Name: ACCU-CHEK FASTCLIX LANCET DRUM] 102 each      Sig: USE 1 EACH AS DIRECTED DAILY. DISPENSE BRAND PER PATIENT'S INSURANCE AT PHARMACY DISCRETION.       Diabetic Supplies Protocol Passed - 3/28/2022  9:42 AM        Passed - Medication is active on med list        Passed - Patient is 18 years of age or older        Passed - Recent (6 mo) or future (30 days) visit within the authorizing provider's specialty     Patient had office visit in the last 6 months or has a visit in the next 30 days with authorizing provider.  See \"Patient Info\" tab in inbasket, or \"Choose Columns\" in Meds & Orders section of the refill encounter.                 Tyrone Gan RN 03/28/22 9:42 AM  "

## 2022-05-22 ENCOUNTER — HEALTH MAINTENANCE LETTER (OUTPATIENT)
Age: 57
End: 2022-05-22

## 2022-06-05 ASSESSMENT — PATIENT HEALTH QUESTIONNAIRE - PHQ9
SUM OF ALL RESPONSES TO PHQ QUESTIONS 1-9: 17
10. IF YOU CHECKED OFF ANY PROBLEMS, HOW DIFFICULT HAVE THESE PROBLEMS MADE IT FOR YOU TO DO YOUR WORK, TAKE CARE OF THINGS AT HOME, OR GET ALONG WITH OTHER PEOPLE: VERY DIFFICULT
SUM OF ALL RESPONSES TO PHQ QUESTIONS 1-9: 17

## 2022-06-06 ENCOUNTER — OFFICE VISIT (OUTPATIENT)
Dept: FAMILY MEDICINE | Facility: CLINIC | Age: 57
End: 2022-06-06
Payer: COMMERCIAL

## 2022-06-06 VITALS
TEMPERATURE: 97.8 F | DIASTOLIC BLOOD PRESSURE: 80 MMHG | SYSTOLIC BLOOD PRESSURE: 130 MMHG | BODY MASS INDEX: 38.44 KG/M2 | RESPIRATION RATE: 18 BRPM | HEART RATE: 80 BPM | WEIGHT: 201.8 LBS | OXYGEN SATURATION: 98 %

## 2022-06-06 DIAGNOSIS — F32.1 CURRENT MODERATE EPISODE OF MAJOR DEPRESSIVE DISORDER WITHOUT PRIOR EPISODE (H): Primary | ICD-10-CM

## 2022-06-06 DIAGNOSIS — E11.65 TYPE 2 DIABETES MELLITUS WITH HYPERGLYCEMIA, WITHOUT LONG-TERM CURRENT USE OF INSULIN (H): ICD-10-CM

## 2022-06-06 LAB — HBA1C MFR BLD: 8.1 % (ref 0–5.6)

## 2022-06-06 PROCEDURE — 99214 OFFICE O/P EST MOD 30 MIN: CPT | Performed by: NURSE PRACTITIONER

## 2022-06-06 PROCEDURE — 83036 HEMOGLOBIN GLYCOSYLATED A1C: CPT | Performed by: NURSE PRACTITIONER

## 2022-06-06 PROCEDURE — 36415 COLL VENOUS BLD VENIPUNCTURE: CPT | Performed by: NURSE PRACTITIONER

## 2022-06-06 RX ORDER — BUPROPION HYDROCHLORIDE 150 MG/1
150 TABLET ORAL EVERY MORNING
Qty: 30 TABLET | Refills: 0 | Status: SHIPPED | OUTPATIENT
Start: 2022-06-06 | End: 2022-07-05

## 2022-06-06 ASSESSMENT — PAIN SCALES - GENERAL: PAINLEVEL: NO PAIN (0)

## 2022-06-06 NOTE — PROGRESS NOTES
Assessment and Plan:     Current moderate episode of major depressive disorder without prior episode (H)  PHQ-9 score is 17.  Discussed treatment options.  We will start bupropion  mg daily.  Educated on its indications and side effects including increased risk of suicide.  She is to follow-up virtually in 1 month.  - buPROPion (WELLBUTRIN XL) 150 MG 24 hr tablet  Dispense: 30 tablet; Refill: 0    Type 2 diabetes mellitus with hyperglycemia, without long-term current use of insulin (H)  This is uncontrolled.  We will check A1c today.  She continues Trulicity, Tresiba, and metformin.  She is content with the plan.  - HEMOGLOBIN A1C        Subjective:     Josefina is a 56 year old female presenting to the clinic for medication management.  Patient has type 2 diabetes which is uncontrolled.  Last A1c was 7.1% on 11/15/2021.  She is currently injecting 3 mg of Trulicity once weekly, 44 units of Tresiba daily, and taking metformin 1000 mg twice daily.  She is trying to consume healthy diet.  She walks for exercise.  She denies any sores on her feet.  Patient is concerned for depression.  She has had a fluctuating appetite.  She has noticed that she is not interested in performing activities that she generally enjoys including knitting, going out with friends, and going to [employer].  She is not motivated to clean the house.  She has had added stress at work.  She works as a  at an [employer] service.  She is concerned of finances and her 's health.  She denies thoughts of suicide.    Answers for HPI/ROS submitted by the patient on 6/5/2022  If you checked off any problems, how difficult have these problems made it for you to do your work, take care of things at home, or get along with other people?: Very difficult  PHQ9 TOTAL SCORE: 17  Frequency of checking blood sugars:: one time daily  What time of day are you checking your blood sugars : before meals  Have you had any blood sugars above 200?: Yes  Have  you had any blood sugars below 70?: No  Hypoglycemia symptoms:: none  Diabetic concerns:: other  Paraesthesia present:: blurry vision  Have you had a diabetic eye exam within the last year?: Yes  Date of last eye exam:: ?  Where was this eye exam done?: [facility]  How many servings of fruits and vegetables do you eat daily?: 2-3  On average, how many sweetened beverages do you drink each day (Examples: soda, juice, sweet tea, etc.  Do NOT count diet or artificially sweetened beverages)?: 0  How many minutes a day do you exercise enough to make your heart beat faster?: 10 to 19  How many days a week do you exercise enough to make your heart beat faster?: 3 or less  How many days per week do you miss taking your medication?: 0    Reviewof Systems: A complete 14 point review of systems was obtained and is negative or as stated in the history of present illness.    Social History     Socioeconomic History     Marital status:      Spouse name: Not on file     Number of children: Not on file     Years of education: Not on file     Highest education level: Not on file   Occupational History     Not on file   Tobacco Use     Smoking status: Never Smoker     Smokeless tobacco: Never Used   Vaping Use     Vaping Use: Never used   Substance and Sexual Activity     Alcohol use: Yes     Alcohol/week: 1.0 standard drink     Drug use: No     Sexual activity: Yes     Partners: Male     Comment:    Other Topics Concern     Not on file   Social History Narrative     Not on file     Social Determinants of Health     Financial Resource Strain: Not on file   Food Insecurity: Not on file   Transportation Needs: Not on file   Physical Activity: Not on file   Stress: Not on file   Social Connections: Not on file   Intimate Partner Violence: Not on file   Housing Stability: Not on file       Active Ambulatory Problems     Diagnosis Date Noted     Type 2 diabetes mellitus with hyperglycemia, without long-term  current use of insulin (H) 10/24/2017     Hyperlipidemia, unspecified hyperlipidemia type 10/24/2017     Edema, unspecified type 10/24/2017     Morbid obesity (H) 08/23/2018     Incarcerated umbilical hernia 12/17/2019     Resolved Ambulatory Problems     Diagnosis Date Noted     No Resolved Ambulatory Problems     Past Medical History:   Diagnosis Date     Diabetes mellitus (H)      Environmental allergies      Hyperlipidemia        Family History   Problem Relation Age of Onset     Lung Cancer Mother      Diabetes Mother      Heart Disease Father      Chronic Obstructive Pulmonary Disease Father      Dementia Father      Parkinsonism Father      Hyperlipidemia Father      Diabetes Maternal Aunt      Diabetes Maternal Grandmother      Diabetes Paternal Grandfather        Objective:     /80 (BP Location: Left arm, Patient Position: Sitting, Cuff Size: Adult Large)   Pulse 80   Temp 97.8  F (36.6  C)   Resp 18   Wt 91.5 kg (201 lb 12.8 oz)   SpO2 98%   BMI 38.44 kg/m      Patient is alert, in no obvious distress.   Skin: Warm, dry.    Lungs:  Clear to auscultation. Respirations even and unlabored.  No wheezing or rales noted.   Heart:  Regular rate and rhythm.  No murmurs, S3, S4, gallops, or rubs.    Musculoskeletal: Monofilament testing is normal bilaterally.

## 2022-06-11 DIAGNOSIS — E11.65 TYPE 2 DIABETES MELLITUS WITH HYPERGLYCEMIA, WITHOUT LONG-TERM CURRENT USE OF INSULIN (H): ICD-10-CM

## 2022-06-11 RX ORDER — PEN NEEDLE, DIABETIC 32GX 5/32"
NEEDLE, DISPOSABLE MISCELLANEOUS
Qty: 100 EACH | Refills: 1 | Status: SHIPPED | OUTPATIENT
Start: 2022-06-11 | End: 2022-12-07

## 2022-06-12 NOTE — TELEPHONE ENCOUNTER
"Last Written Prescription Date:  6/28/21  Last Fill Quantity: 100,  # refills: 3   Last office visit provider:  6/6/22     Requested Prescriptions   Pending Prescriptions Disp Refills     BD PEN NEEDLE IVORY 2ND GEN 32G X 4 MM miscellaneous [Pharmacy Med Name: BD IVORY 2 GEN PEN NDL [id number]] 100 each 3     Sig: INJECT 1 EACH UNDER THE SKIN DAILY.       Diabetic Supplies Protocol Passed - 6/11/2022  9:25 AM        Passed - Medication is active on med list        Passed - Patient is 18 years of age or older        Passed - Recent (6 mo) or future (30 days) visit within the authorizing provider's specialty     Patient had office visit in the last 6 months or has a visit in the next 30 days with authorizing provider.  See \"Patient Info\" tab in inbasket, or \"Choose Columns\" in Meds & Orders section of the refill encounter.                 Marine Moon RN 06/11/22 8:12 PM  "

## 2022-07-03 DIAGNOSIS — F32.1 CURRENT MODERATE EPISODE OF MAJOR DEPRESSIVE DISORDER WITHOUT PRIOR EPISODE (H): ICD-10-CM

## 2022-07-04 NOTE — TELEPHONE ENCOUNTER
"Routing refill request to provider for review/approval because:  PHQ-9 score of 17 on 6/6/2022.  New medication. Please review.     Last Written Prescription Date:  6/6/2022  Last Fill Quantity: 30,  # refills: 0   Last office visit provider:  6/6/2022     Requested Prescriptions   Pending Prescriptions Disp Refills     buPROPion (WELLBUTRIN XL) 150 MG 24 hr tablet [Pharmacy Med Name: BUPROPION HCL  MG TABLET] 30 tablet 0     Sig: TAKE 1 TABLET BY MOUTH EVERY MORNING       SSRIs Protocol Failed - 7/4/2022  6:43 AM        Failed - PHQ-9 score less than 5 in past 6 months     Please review last PHQ-9 score.           Passed - Medication is Bupropion     If the medication is Bupropion (Wellbutrin), and the patient is taking for smoking cessation; OK to refill.          Passed - Medication is active on med list        Passed - Patient is age 18 or older        Passed - No active pregnancy on record        Passed - No positive pregnancy test in last 12 months        Passed - Recent (6 mo) or future (30 days) visit within the authorizing provider's specialty     Patient had office visit in the last 6 months or has a visit in the next 30 days with authorizing provider or within the authorizing provider's specialty.  See \"Patient Info\" tab in inbasket, or \"Choose Columns\" in Meds & Orders section of the refill encounter.                 Annemarie Matson RN 07/04/22 6:49 AM  "

## 2022-07-05 RX ORDER — BUPROPION HYDROCHLORIDE 150 MG/1
TABLET ORAL
Qty: 30 TABLET | Refills: 0 | Status: SHIPPED | OUTPATIENT
Start: 2022-07-05 | End: 2022-07-07

## 2022-07-07 ENCOUNTER — VIRTUAL VISIT (OUTPATIENT)
Dept: FAMILY MEDICINE | Facility: CLINIC | Age: 57
End: 2022-07-07
Payer: COMMERCIAL

## 2022-07-07 DIAGNOSIS — F32.0 CURRENT MILD EPISODE OF MAJOR DEPRESSIVE DISORDER WITHOUT PRIOR EPISODE (H): Primary | ICD-10-CM

## 2022-07-07 DIAGNOSIS — E11.65 TYPE 2 DIABETES MELLITUS WITH HYPERGLYCEMIA, WITHOUT LONG-TERM CURRENT USE OF INSULIN (H): ICD-10-CM

## 2022-07-07 PROBLEM — F32.9 MAJOR DEPRESSION, SINGLE EPISODE: Status: ACTIVE | Noted: 2022-07-07

## 2022-07-07 PROCEDURE — 99213 OFFICE O/P EST LOW 20 MIN: CPT | Mod: 95 | Performed by: NURSE PRACTITIONER

## 2022-07-07 RX ORDER — BUPROPION HYDROCHLORIDE 150 MG/1
150 TABLET ORAL EVERY MORNING
Qty: 90 TABLET | Refills: 3 | Status: SHIPPED | OUTPATIENT
Start: 2022-07-07 | End: 2023-07-19

## 2022-07-07 ASSESSMENT — ANXIETY QUESTIONNAIRES
3. WORRYING TOO MUCH ABOUT DIFFERENT THINGS: NOT AT ALL
2. NOT BEING ABLE TO STOP OR CONTROL WORRYING: NOT AT ALL
GAD7 TOTAL SCORE: 3
7. FEELING AFRAID AS IF SOMETHING AWFUL MIGHT HAPPEN: NOT AT ALL
5. BEING SO RESTLESS THAT IT IS HARD TO SIT STILL: NOT AT ALL
IF YOU CHECKED OFF ANY PROBLEMS ON THIS QUESTIONNAIRE, HOW DIFFICULT HAVE THESE PROBLEMS MADE IT FOR YOU TO DO YOUR WORK, TAKE CARE OF THINGS AT HOME, OR GET ALONG WITH OTHER PEOPLE: NOT DIFFICULT AT ALL
6. BECOMING EASILY ANNOYED OR IRRITABLE: MORE THAN HALF THE DAYS
1. FEELING NERVOUS, ANXIOUS, OR ON EDGE: SEVERAL DAYS
GAD7 TOTAL SCORE: 3

## 2022-07-07 ASSESSMENT — PATIENT HEALTH QUESTIONNAIRE - PHQ9
5. POOR APPETITE OR OVEREATING: NOT AT ALL
SUM OF ALL RESPONSES TO PHQ QUESTIONS 1-9: 4

## 2022-07-07 NOTE — PROGRESS NOTES
Oh is a 56 year old who is being evaluated via a billable video visit.      How would you like to obtain your AVS? MyChart  If the video visit is dropped, the invitation should be resent by: Text to cell phone: [phone]  Will anyone else be joining your video visit? No          Current mild episode of major depressive disorder without prior episode (H)  PHQ-9 score is 4.  She would like to continue with her current dose of bupropion.  She is to follow-up in 6 months.  - buPROPion (WELLBUTRIN XL) 150 MG 24 hr tablet  Dispense: 90 tablet; Refill: 3    Type 2 diabetes mellitus with hyperglycemia, without long-term current use of insulin (H)  Patient is making healthy lifestyle changes.  She continues Tresiba, Trulicity, and metformin as prescribed.         Subjective   Oh is a 56 year old presenting for the following health issues:  Follow Up (Depression )    Patient presents today to follow-up on depression.  She was seen on 6/6/2022 where she had complained of lack of motivation.  We started bupropion  mg daily.  Patient is tolerating the medication well without any side effects.  She has noticed a significant difference in her personality and mood.  She is now more interested in performing activities and completing tasks around the house.  She has felt more energetic and motivated.  She is making healthy lifestyle changes including only eating when she is hungry.  She is trying to avoid eating in her family room.  She is drinking less Diet Coke as she has been unable to tolerate this.  She denies thoughts of suicide.  She would like to continue with her current dose of bupropion.  She has type 2 diabetes which is uncontrolled.  She is currently using Tresiba, Trulicity and taking metformin.      Review of Systems   Constitutional, HEENT, cardiovascular, pulmonary, gi and gu systems are negative, except as otherwise noted.      Objective    Vitals - Patient Reported  Weight (Patient Reported): 90.3  kg (199 lb)        Physical Exam   GENERAL: Healthy, alert and no distress  EYES: Eyes grossly normal to inspection.  No discharge or erythema, or obvious scleral/conjunctival abnormalities.  HENT: Normal cephalic/atraumatic.  External ears, nose and mouth without ulcers or lesions.  No nasal drainage visible.  NECK: No asymmetry, visible masses or scars  RESP: No audible wheeze, cough, or visible cyanosis.  No visible retractions or increased work of breathing.    MS: No gross musculoskeletal defects noted.  Normal range of motion.  No visible edema.  SKIN: Visible skin clear. No significant rash, abnormal pigmentation or lesions.  NEURO: Cranial nerves grossly intact.  Mentation and speech appropriate for age.  PSYCH: Mentation appears normal, affect normal/bright, judgement and insight intact, normal speech and appearance well-groomed.        Video-Visit Details    Video Start Time: 7:40 AM    Type of service:  Video Visit    Video End Time:7:45 AM    Originating Location (pt. Location): Home    Distant Location (provider location):  [facility]     Platform used for Video Visit: Olivia Don

## 2022-08-12 ENCOUNTER — MYC MEDICAL ADVICE (OUTPATIENT)
Dept: FAMILY MEDICINE | Facility: CLINIC | Age: 57
End: 2022-08-12

## 2022-08-12 ENCOUNTER — TRANSFERRED RECORDS (OUTPATIENT)
Dept: HEALTH INFORMATION MANAGEMENT | Facility: CLINIC | Age: 57
End: 2022-08-12

## 2022-08-12 LAB — RETINOPATHY: NORMAL

## 2022-09-24 ENCOUNTER — HEALTH MAINTENANCE LETTER (OUTPATIENT)
Age: 57
End: 2022-09-24

## 2022-09-24 DIAGNOSIS — E11.65 TYPE 2 DIABETES MELLITUS WITH HYPERGLYCEMIA (H): ICD-10-CM

## 2022-09-25 RX ORDER — LANCETS
EACH MISCELLANEOUS
Qty: 102 EACH | Refills: 3 | Status: SHIPPED | OUTPATIENT
Start: 2022-09-25

## 2022-09-25 NOTE — TELEPHONE ENCOUNTER
"Last Written Prescription Date:  3/28/22  Last Fill Quantity: 100,  # refills: 1   Last office visit provider:  7/7/22     Requested Prescriptions   Pending Prescriptions Disp Refills     blood glucose monitoring (ACCU-CHEK FASTCLIX) lancets [Pharmacy Med Name: ACCU-CHEK FASTCLIX LANCET DRUM] 102 each 1     Sig: USE 1 EACH AS DIRECTED DAILY. DISPENSE BRAND PER PATIENT'S INSURANCE AT PHARMACY DISCRETION.       Diabetic Supplies Protocol Passed - 9/24/2022  9:07 AM        Passed - Medication is active on med list        Passed - Patient is 18 years of age or older        Passed - Recent (6 mo) or future (30 days) visit within the authorizing provider's specialty     Patient had office visit in the last 6 months or has a visit in the next 30 days with authorizing provider.  See \"Patient Info\" tab in inbasket, or \"Choose Columns\" in Meds & Orders section of the refill encounter.                 Elizabeth Chow RN 09/25/22 1:13 PM  "

## 2022-09-30 ENCOUNTER — MYC MEDICAL ADVICE (OUTPATIENT)
Dept: FAMILY MEDICINE | Facility: CLINIC | Age: 57
End: 2022-09-30

## 2022-09-30 NOTE — CONFIDENTIAL NOTE
[employer] message with questions re: vaccines    Liliana Gates RN on 9/30/2022 at 3:45 PM

## 2022-10-03 DIAGNOSIS — E11.65 TYPE 2 DIABETES MELLITUS WITH HYPERGLYCEMIA, WITHOUT LONG-TERM CURRENT USE OF INSULIN (H): ICD-10-CM

## 2022-10-03 RX ORDER — BLOOD SUGAR DIAGNOSTIC
STRIP MISCELLANEOUS
Qty: 100 STRIP | Refills: 1 | Status: SHIPPED | OUTPATIENT
Start: 2022-10-03 | End: 2023-04-17

## 2022-10-03 NOTE — TELEPHONE ENCOUNTER
"Last Written Prescription Date:  10/11/21  Last Fill Quantity: 100,  # refills: 3   Last office visit provider:  7/7/22     Requested Prescriptions   Pending Prescriptions Disp Refills     ACCU-CHEK GUIDE test strip [Pharmacy Med Name: ACCU-CHEK GUIDE TEST STRIP] 100 strip 3     Sig: USE 1 EACH AS DIRECTED DAILY. DISPENSE BRAND PER PATIENT'S INSURANCE AT PHARMACY DISCRETION.       Diabetic Supplies Protocol Passed - 10/3/2022  1:41 PM        Passed - Medication is active on med list        Passed - Patient is 18 years of age or older        Passed - Recent (6 mo) or future (30 days) visit within the authorizing provider's specialty     Patient had office visit in the last 6 months or has a visit in the next 30 days with authorizing provider.  See \"Patient Info\" tab in inbasket, or \"Choose Columns\" in Meds & Orders section of the refill encounter.                 Tyrone Gan RN 10/03/22 1:41 PM  "

## 2022-10-21 ENCOUNTER — IMMUNIZATION (OUTPATIENT)
Dept: FAMILY MEDICINE | Facility: CLINIC | Age: 57
End: 2022-10-21
Payer: COMMERCIAL

## 2022-10-21 DIAGNOSIS — Z23 ENCOUNTER FOR ADMINISTRATION OF VACCINE: Primary | ICD-10-CM

## 2022-10-21 PROCEDURE — 90472 IMMUNIZATION ADMIN EACH ADD: CPT

## 2022-10-21 PROCEDURE — 91312 COVID-19,PF,PFIZER BOOSTER BIVALENT: CPT

## 2022-10-21 PROCEDURE — 90471 IMMUNIZATION ADMIN: CPT

## 2022-10-21 PROCEDURE — 99207 PR NO CHARGE NURSE ONLY: CPT

## 2022-10-21 PROCEDURE — 90682 RIV4 VACC RECOMBINANT DNA IM: CPT

## 2022-10-21 PROCEDURE — 0124A COVID-19,PF,PFIZER BOOSTER BIVALENT: CPT

## 2022-10-21 PROCEDURE — 90750 HZV VACC RECOMBINANT IM: CPT

## 2022-10-21 NOTE — PROGRESS NOTES
Administered Covid, flu and shingles vaccines. Advised patient to return in 2-6 months for second shingles shot. Kelly Ventura RN on 10/21/2022 at 10:57 AM

## 2022-11-27 ASSESSMENT — PATIENT HEALTH QUESTIONNAIRE - PHQ9
SUM OF ALL RESPONSES TO PHQ QUESTIONS 1-9: 8
SUM OF ALL RESPONSES TO PHQ QUESTIONS 1-9: 8
10. IF YOU CHECKED OFF ANY PROBLEMS, HOW DIFFICULT HAVE THESE PROBLEMS MADE IT FOR YOU TO DO YOUR WORK, TAKE CARE OF THINGS AT HOME, OR GET ALONG WITH OTHER PEOPLE: SOMEWHAT DIFFICULT

## 2022-12-02 ENCOUNTER — OFFICE VISIT (OUTPATIENT)
Dept: FAMILY MEDICINE | Facility: CLINIC | Age: 57
End: 2022-12-02
Attending: NURSE PRACTITIONER
Payer: COMMERCIAL

## 2022-12-02 VITALS
BODY MASS INDEX: 36.96 KG/M2 | OXYGEN SATURATION: 99 % | RESPIRATION RATE: 22 BRPM | SYSTOLIC BLOOD PRESSURE: 110 MMHG | HEART RATE: 88 BPM | WEIGHT: 194 LBS | DIASTOLIC BLOOD PRESSURE: 60 MMHG

## 2022-12-02 DIAGNOSIS — E11.65 TYPE 2 DIABETES MELLITUS WITH HYPERGLYCEMIA, WITHOUT LONG-TERM CURRENT USE OF INSULIN (H): Primary | ICD-10-CM

## 2022-12-02 DIAGNOSIS — R60.9 EDEMA, UNSPECIFIED TYPE: ICD-10-CM

## 2022-12-02 DIAGNOSIS — E78.5 HYPERLIPIDEMIA, UNSPECIFIED HYPERLIPIDEMIA TYPE: ICD-10-CM

## 2022-12-02 DIAGNOSIS — E66.01 MORBID OBESITY (H): ICD-10-CM

## 2022-12-02 LAB
HBA1C MFR BLD: 7.1 % (ref 0–5.6)
HOLD SPECIMEN: NORMAL

## 2022-12-02 PROCEDURE — 36415 COLL VENOUS BLD VENIPUNCTURE: CPT | Performed by: NURSE PRACTITIONER

## 2022-12-02 PROCEDURE — 83036 HEMOGLOBIN GLYCOSYLATED A1C: CPT | Performed by: NURSE PRACTITIONER

## 2022-12-02 PROCEDURE — 99214 OFFICE O/P EST MOD 30 MIN: CPT | Performed by: NURSE PRACTITIONER

## 2022-12-02 RX ORDER — DULAGLUTIDE 3 MG/.5ML
3 INJECTION, SOLUTION SUBCUTANEOUS
Qty: 6 ML | Refills: 4 | Status: SHIPPED | OUTPATIENT
Start: 2022-12-02 | End: 2023-12-12

## 2022-12-02 RX ORDER — HYDROCHLOROTHIAZIDE 25 MG/1
25 TABLET ORAL DAILY
Qty: 90 TABLET | Refills: 3 | Status: SHIPPED | OUTPATIENT
Start: 2022-12-02 | End: 2024-02-01

## 2022-12-02 RX ORDER — INSULIN DEGLUDEC 100 U/ML
44 INJECTION, SOLUTION SUBCUTANEOUS DAILY
Qty: 45 ML | Refills: 3 | Status: SHIPPED | OUTPATIENT
Start: 2022-12-02 | End: 2024-02-19

## 2022-12-02 NOTE — PROGRESS NOTES
Assessment and Plan:     Type 2 diabetes mellitus with hyperglycemia, without long-term current use of insulin (H)  A1c is 7.1%.  Patient continues Trulicity, Tresiba, and metformin as prescribed.  - Hemoglobin A1c  - Hemoglobin A1c  - Dulaglutide (TRULICITY) 3 MG/0.5ML SOPN  Dispense: 6 mL; Refill: 4  - insulin degludec (TRESIBA FLEXTOUCH) 100 UNIT/ML pen  Dispense: 45 mL; Refill: 3  - metFORMIN (GLUCOPHAGE) 500 MG tablet  Dispense: 360 tablet; Refill: 1    Hyperlipidemia, unspecified hyperlipidemia type  This is controlled.  She continues fenofibrate and pravastatin.    Edema, unspecified type  This is controlled.  She continues hydrochlorothiazide.  - hydrochlorothiazide (HYDRODIURIL) 25 MG tablet  Dispense: 90 tablet; Refill: 3    Morbid obesity (H)  Recommend consuming a healthy diet and exercising.  This is contributing to diabetes and hyperlipidemia.  Will refer to weight management.  - Comprehensive Weight Management    The patient is content with the plan and will follow-up in 6 months for medication management or sooner with any further concerns.       Subjective:     Josefina is a 57 year old female presenting to the clinic for medication management.  Patient has multiple comorbidities including type 2 diabetes, hyperlipidemia, chronic depression, obesity.  She has been checking her blood sugars once daily.  Her fasting labs have been less than 130.  She is injecting 3 mg of Trulicity once weekly, Tresiba 44 units daily, and taking metformin 1000 mg twice daily. A1c is 7.1% today.  She is trying to consume healthy diet.  She completed a 3-day challenge for [employer] and does plan on starting that diet.  She walks for exercise.  She is taking fenofibrate 145 mg daily and pravastatin 80 mg daily for lipid management.  Last LDL was 96 on 2/4/2022.  Blood pressure and edema are well controlled with hydrochlorothiazide 25 mg daily.    Answers for HPI/ROS submitted by the patient on 11/27/2022  If you checked off  any problems, how difficult have these problems made it for you to do your work, take care of things at home, or get along with other people?: Somewhat difficult  PHQ9 TOTAL SCORE: 8  Depression/Anxiety: Depression  Status since last visit:: medium  Other associated symptoms of depression:: No  Significant life event: : No  Anxious:: No  Current substance use:: No    Reviewof Systems: A complete 14 point review of systems was obtained and is negative or as stated in the history of present illness.    Social History     Socioeconomic History     Marital status:      Spouse name: Not on file     Number of children: Not on file     Years of education: Not on file     Highest education level: Not on file   Occupational History     Not on file   Tobacco Use     Smoking status: Never     Smokeless tobacco: Never   Vaping Use     Vaping Use: Never used   Substance and Sexual Activity     Alcohol use: Yes     Alcohol/week: 1.0 standard drink     Drug use: No     Sexual activity: Yes     Partners: Male     Comment:    Other Topics Concern     Not on file   Social History Narrative     Not on file     Social Determinants of Health     Financial Resource Strain: Not on file   Food Insecurity: Not on file   Transportation Needs: Not on file   Physical Activity: Not on file   Stress: Not on file   Social Connections: Not on file   Intimate Partner Violence: Not on file   Housing Stability: Not on file       Active Ambulatory Problems     Diagnosis Date Noted     Type 2 diabetes mellitus with hyperglycemia, without long-term current use of insulin (H) 10/24/2017     Hyperlipidemia, unspecified hyperlipidemia type 10/24/2017     Edema, unspecified type 10/24/2017     Morbid obesity (H) 08/23/2018     Incarcerated umbilical hernia 12/17/2019     Major depression, single episode 07/07/2022     Resolved Ambulatory Problems     Diagnosis Date Noted     No Resolved Ambulatory Problems     Past Medical History:   Diagnosis  Date     Diabetes mellitus (H)      Environmental allergies      Hyperlipidemia        Family History   Problem Relation Age of Onset     Lung Cancer Mother      Diabetes Mother      Heart Disease Father      Chronic Obstructive Pulmonary Disease Father      Dementia Father      Parkinsonism Father      Hyperlipidemia Father      Diabetes Maternal Aunt      Diabetes Maternal Grandmother      Diabetes Paternal Grandfather        Objective:     /60   Pulse 88   Resp 22   Wt 88 kg (194 lb)   SpO2 99%   BMI 36.96 kg/m      Patient is alert, in no obvious distress.   Skin: Warm, dry.    Lungs:  Clear to auscultation. Respirations even and unlabored.  No wheezing or rales noted.   Heart:  Regular rate and rhythm.  No murmurs, S3, S4, gallops, or rubs.    Musculoskeletal:  No edema is present in bilateral lower extremities.

## 2022-12-06 DIAGNOSIS — E11.65 TYPE 2 DIABETES MELLITUS WITH HYPERGLYCEMIA, WITHOUT LONG-TERM CURRENT USE OF INSULIN (H): ICD-10-CM

## 2022-12-07 RX ORDER — PEN NEEDLE, DIABETIC 32GX 5/32"
NEEDLE, DISPOSABLE MISCELLANEOUS
Qty: 100 EACH | Refills: 1 | Status: SHIPPED | OUTPATIENT
Start: 2022-12-07 | End: 2023-06-08

## 2022-12-07 NOTE — TELEPHONE ENCOUNTER
"Last Written Prescription Date:  6/11/22  Last Fill Quantity: 100,  # refills: 1   Last office visit provider:  12/2/22     Requested Prescriptions   Pending Prescriptions Disp Refills     BD PEN NEEDLE IVORY 2ND GEN 32G X 4 MM miscellaneous [Pharmacy Med Name: BD IVORY 2 GEN PEN NDL [id number]] 100 each 1     Sig: INJECT 1 EACH UNDER THE SKIN DAILY.       Diabetic Supplies Protocol Passed - 12/6/2022  2:40 PM        Passed - Medication is active on med list        Passed - Patient is 18 years of age or older        Passed - Recent (6 mo) or future (30 days) visit within the authorizing provider's specialty     Patient had office visit in the last 6 months or has a visit in the next 30 days with authorizing provider.  See \"Patient Info\" tab in inbasket, or \"Choose Columns\" in Meds & Orders section of the refill encounter.                 Marine Moon RN 12/07/22 2:25 PM  "

## 2022-12-10 DIAGNOSIS — E78.2 MIXED HYPERLIPIDEMIA: ICD-10-CM

## 2022-12-11 RX ORDER — PRAVASTATIN SODIUM 80 MG/1
TABLET ORAL
Qty: 90 TABLET | Refills: 2 | Status: SHIPPED | OUTPATIENT
Start: 2022-12-11 | End: 2023-09-01

## 2022-12-26 ENCOUNTER — ALLIED HEALTH/NURSE VISIT (OUTPATIENT)
Dept: FAMILY MEDICINE | Facility: CLINIC | Age: 57
End: 2022-12-26
Payer: COMMERCIAL

## 2022-12-26 DIAGNOSIS — Z23 NEED FOR SHINGLES VACCINE: Primary | ICD-10-CM

## 2022-12-26 PROCEDURE — 99207 PR NO CHARGE NURSE ONLY: CPT

## 2022-12-26 PROCEDURE — 90750 HZV VACC RECOMBINANT IM: CPT

## 2022-12-26 PROCEDURE — 90471 IMMUNIZATION ADMIN: CPT

## 2023-04-08 DIAGNOSIS — E78.2 MIXED HYPERLIPIDEMIA: ICD-10-CM

## 2023-04-09 NOTE — TELEPHONE ENCOUNTER
"Routing refill request to provider for review/approval because:  Labs not current.    Last Written Prescription Date:  2/4/22  Last Fill Quantity: 90,  # refills: 3   Last office visit provider:  12/2/22     Requested Prescriptions   Pending Prescriptions Disp Refills     fenofibrate (TRICOR) 145 MG tablet [Pharmacy Med Name: FENOFIBRATE 145 MG TABLET] 90 tablet 3     Sig: TAKE 1 TABLET BY MOUTH EVERY DAY       Fibrates Failed - 4/8/2023  7:38 AM        Failed - Lipid panel on file in past 12 months     Recent Labs   Lab Test 02/04/22  0910   CHOL 167   TRIG 136   HDL 44*   LDL 96               Passed - No abnormal creatine kinase in past 12 months     No lab results found.             Passed - Recent (12 mo) or future (30 days) visit within the authorizing provider's specialty     Patient has had an office visit with the authorizing provider or a provider within the authorizing providers department within the previous 12 mos or has a future within next 30 days. See \"Patient Info\" tab in inbasket, or \"Choose Columns\" in Meds & Orders section of the refill encounter.              Passed - Medication is active on med list        Passed - Patient is age 18 or older        Passed - No active pregnancy on record        Passed - No positive pregnancy test in past 12 months             SUZANNE CERDA RN 04/09/23 11:09 AM  "

## 2023-04-10 RX ORDER — FENOFIBRATE 145 MG/1
TABLET, COATED ORAL
Qty: 90 TABLET | Refills: 3 | Status: SHIPPED | OUTPATIENT
Start: 2023-04-10 | End: 2024-04-04

## 2023-04-17 DIAGNOSIS — E11.65 TYPE 2 DIABETES MELLITUS WITH HYPERGLYCEMIA, WITHOUT LONG-TERM CURRENT USE OF INSULIN (H): ICD-10-CM

## 2023-04-17 RX ORDER — BLOOD SUGAR DIAGNOSTIC
STRIP MISCELLANEOUS
Qty: 100 STRIP | Refills: 0 | Status: SHIPPED | OUTPATIENT
Start: 2023-04-17 | End: 2023-07-24

## 2023-04-17 NOTE — TELEPHONE ENCOUNTER
"Last Written Prescription Date:  10/3/2022  Last Fill Quantity: 100,  # refills: 1   Last office visit provider:  12/2/2022     Requested Prescriptions   Pending Prescriptions Disp Refills     ACCU-CHEK GUIDE test strip [Pharmacy Med Name: ACCU-CHEK GUIDE TEST STRIP] 100 strip 1     Sig: USE 1 EACH AS DIRECTED DAILY       Diabetic Supplies Protocol Passed - 4/17/2023  1:00 AM        Passed - Medication is active on med list        Passed - Patient is 18 years of age or older        Passed - Recent (6 mo) or future (30 days) visit within the authorizing provider's specialty     Patient had office visit in the last 6 months or has a visit in the next 30 days with authorizing provider.  See \"Patient Info\" tab in inbasket, or \"Choose Columns\" in Meds & Orders section of the refill encounter.                 Tg Balderrama RN 04/17/23 1:28 AM  "

## 2023-05-08 ENCOUNTER — HEALTH MAINTENANCE LETTER (OUTPATIENT)
Age: 58
End: 2023-05-08

## 2023-06-06 ENCOUNTER — OFFICE VISIT (OUTPATIENT)
Dept: FAMILY MEDICINE | Facility: CLINIC | Age: 58
End: 2023-06-06
Payer: COMMERCIAL

## 2023-06-06 VITALS
HEIGHT: 61 IN | DIASTOLIC BLOOD PRESSURE: 70 MMHG | WEIGHT: 187.6 LBS | SYSTOLIC BLOOD PRESSURE: 120 MMHG | TEMPERATURE: 98 F | HEART RATE: 84 BPM | RESPIRATION RATE: 16 BRPM | OXYGEN SATURATION: 98 % | BODY MASS INDEX: 35.42 KG/M2

## 2023-06-06 DIAGNOSIS — F33.0 MILD EPISODE OF RECURRENT MAJOR DEPRESSIVE DISORDER (H): ICD-10-CM

## 2023-06-06 DIAGNOSIS — E66.01 MORBID OBESITY (H): ICD-10-CM

## 2023-06-06 DIAGNOSIS — Z79.899 MEDICATION MANAGEMENT: ICD-10-CM

## 2023-06-06 DIAGNOSIS — E11.65 TYPE 2 DIABETES MELLITUS WITH HYPERGLYCEMIA, WITHOUT LONG-TERM CURRENT USE OF INSULIN (H): Primary | ICD-10-CM

## 2023-06-06 DIAGNOSIS — E78.5 HYPERLIPIDEMIA, UNSPECIFIED HYPERLIPIDEMIA TYPE: ICD-10-CM

## 2023-06-06 DIAGNOSIS — R60.9 EDEMA, UNSPECIFIED TYPE: ICD-10-CM

## 2023-06-06 PROBLEM — F33.9 MAJOR DEPRESSION, RECURRENT (H): Status: ACTIVE | Noted: 2023-06-06

## 2023-06-06 LAB
ALBUMIN SERPL BCG-MCNC: 4.3 G/DL (ref 3.5–5.2)
ALP SERPL-CCNC: 60 U/L (ref 35–104)
ALT SERPL W P-5'-P-CCNC: 17 U/L (ref 10–35)
ANION GAP SERPL CALCULATED.3IONS-SCNC: 12 MMOL/L (ref 7–15)
AST SERPL W P-5'-P-CCNC: 19 U/L (ref 10–35)
BILIRUB SERPL-MCNC: 0.3 MG/DL
BUN SERPL-MCNC: 17.4 MG/DL (ref 6–20)
CALCIUM SERPL-MCNC: 10 MG/DL (ref 8.6–10)
CHLORIDE SERPL-SCNC: 103 MMOL/L (ref 98–107)
CHOLEST SERPL-MCNC: 156 MG/DL
CREAT SERPL-MCNC: 0.92 MG/DL (ref 0.51–0.95)
CREAT UR-MCNC: 117 MG/DL
DEPRECATED HCO3 PLAS-SCNC: 24 MMOL/L (ref 22–29)
GFR SERPL CREATININE-BSD FRML MDRD: 73 ML/MIN/1.73M2
GLUCOSE SERPL-MCNC: 80 MG/DL (ref 70–99)
HBA1C MFR BLD: 6.2 % (ref 0–5.6)
HDLC SERPL-MCNC: 46 MG/DL
LDLC SERPL CALC-MCNC: 86 MG/DL
MICROALBUMIN UR-MCNC: <12 MG/L
MICROALBUMIN/CREAT UR: NORMAL MG/G{CREAT}
NONHDLC SERPL-MCNC: 110 MG/DL
POTASSIUM SERPL-SCNC: 4 MMOL/L (ref 3.4–5.3)
PROT SERPL-MCNC: 7.5 G/DL (ref 6.4–8.3)
SODIUM SERPL-SCNC: 139 MMOL/L (ref 136–145)
TRIGL SERPL-MCNC: 118 MG/DL

## 2023-06-06 PROCEDURE — 96127 BRIEF EMOTIONAL/BEHAV ASSMT: CPT | Performed by: NURSE PRACTITIONER

## 2023-06-06 PROCEDURE — 83036 HEMOGLOBIN GLYCOSYLATED A1C: CPT | Performed by: NURSE PRACTITIONER

## 2023-06-06 PROCEDURE — 36415 COLL VENOUS BLD VENIPUNCTURE: CPT | Performed by: NURSE PRACTITIONER

## 2023-06-06 PROCEDURE — 90472 IMMUNIZATION ADMIN EACH ADD: CPT | Performed by: NURSE PRACTITIONER

## 2023-06-06 PROCEDURE — 90632 HEPA VACCINE ADULT IM: CPT | Performed by: NURSE PRACTITIONER

## 2023-06-06 PROCEDURE — 80061 LIPID PANEL: CPT | Performed by: NURSE PRACTITIONER

## 2023-06-06 PROCEDURE — 90746 HEPB VACCINE 3 DOSE ADULT IM: CPT | Performed by: NURSE PRACTITIONER

## 2023-06-06 PROCEDURE — 90471 IMMUNIZATION ADMIN: CPT | Performed by: NURSE PRACTITIONER

## 2023-06-06 PROCEDURE — 99214 OFFICE O/P EST MOD 30 MIN: CPT | Mod: 25 | Performed by: NURSE PRACTITIONER

## 2023-06-06 PROCEDURE — 82570 ASSAY OF URINE CREATININE: CPT | Performed by: NURSE PRACTITIONER

## 2023-06-06 PROCEDURE — 82043 UR ALBUMIN QUANTITATIVE: CPT | Performed by: NURSE PRACTITIONER

## 2023-06-06 PROCEDURE — 80053 COMPREHEN METABOLIC PANEL: CPT | Performed by: NURSE PRACTITIONER

## 2023-06-06 ASSESSMENT — PATIENT HEALTH QUESTIONNAIRE - PHQ9
10. IF YOU CHECKED OFF ANY PROBLEMS, HOW DIFFICULT HAVE THESE PROBLEMS MADE IT FOR YOU TO DO YOUR WORK, TAKE CARE OF THINGS AT HOME, OR GET ALONG WITH OTHER PEOPLE: NOT DIFFICULT AT ALL
SUM OF ALL RESPONSES TO PHQ QUESTIONS 1-9: 3
SUM OF ALL RESPONSES TO PHQ QUESTIONS 1-9: 3

## 2023-06-06 ASSESSMENT — PAIN SCALES - GENERAL: PAINLEVEL: NO PAIN (0)

## 2023-06-06 NOTE — PROGRESS NOTES
Assessment and Plan:     Type 2 diabetes mellitus with hyperglycemia, without long-term current use of insulin (H)  This has been uncontrolled.  We will check A1c today.  She continues Trulicity, Tresiba, and metformin as prescribed.  - Albumin Random Urine Quantitative with Creat Ratio  - HEMOGLOBIN A1C    Hyperlipidemia, unspecified hyperlipidemia type  Goal LDL is less than 100.  She continues pravastatin and fenofibrate.  - Lipid panel reflex to direct LDL Non-fasting    Mild episode of recurrent major depressive disorder (H)  Patient continues bupropion XL.    Edema, unspecified type  Patient continues hydrochlorothiazide.  This is controlled.      Morbid obesity (H)  Recommend consuming a healthy diet and exercising.  This is contributing to diabetes, hyperlipidemia    Medication management  - Comprehensive metabolic panel (BMP + Alb, Alk Phos, ALT, AST, Total. Bili, TP)    The patient is content with the plan and will follow-up in 6 months for medication management or sooner with any further concerns.       Subjective:     Josefina is a 57 year old female presenting to the clinic for medication management.  Patient has multiple comorbidities including type 2 diabetes, hyperlipidemia, chronic depression, obesity.  She has been checking her blood sugars once daily.  Her average blood sugar for 90 days was 110.  She is injecting 3 mg of Trulicity once weekly, Tresiba 44 units daily, and taking metformin 1000 mg twice daily. A1c is 7.1% today.  She is trying to consume healthy diet.  She walks for exercise.  She is taking fenofibrate 145 mg daily and pravastatin 80 mg daily for lipid management.  Last LDL was 96 on 2/4/2022.  Blood pressure and edema are well controlled with hydrochlorothiazide 25 mg daily.  She is taking bupropion  mg daily.  Depression which is well controlled.  She plans on traveling on a cruise to the [facility] in January.    Reviewof Systems: A complete 14 point review of systems was  obtained and is negative or as stated in the history of present illness.    Social History     Socioeconomic History     Marital status:      Spouse name: Not on file     Number of children: Not on file     Years of education: Not on file     Highest education level: Not on file   Occupational History     Not on file   Tobacco Use     Smoking status: Never     Smokeless tobacco: Never   Vaping Use     Vaping status: Never Used   Substance and Sexual Activity     Alcohol use: Yes     Alcohol/week: 1.0 standard drink of alcohol     Drug use: No     Sexual activity: Yes     Partners: Male     Comment:    Other Topics Concern     Not on file   Social History Narrative     Not on file     Social Determinants of Health     Financial Resource Strain: Not on file   Food Insecurity: Not on file   Transportation Needs: Not on file   Physical Activity: Not on file   Stress: Not on file   Social Connections: Not on file   Intimate Partner Violence: Not on file   Housing Stability: Not on file       Active Ambulatory Problems     Diagnosis Date Noted     Type 2 diabetes mellitus with hyperglycemia, without long-term current use of insulin (H) 10/24/2017     Hyperlipidemia, unspecified hyperlipidemia type 10/24/2017     Edema, unspecified type 10/24/2017     Morbid obesity (H) 08/23/2018     Incarcerated umbilical hernia 12/17/2019     Major depression, single episode 07/07/2022     Major depression, recurrent (H) 06/06/2023     Resolved Ambulatory Problems     Diagnosis Date Noted     No Resolved Ambulatory Problems     Past Medical History:   Diagnosis Date     Diabetes mellitus (H)      Environmental allergies      Hyperlipidemia        Family History   Problem Relation Age of Onset     Lung Cancer Mother      Diabetes Mother      Heart Disease Father      Chronic Obstructive Pulmonary Disease Father      Dementia Father      Parkinsonism Father      Hyperlipidemia Father      Diabetes Maternal Aunt      Diabetes  "Maternal Grandmother      Diabetes Paternal Grandfather        Objective:     /70   Pulse 84   Temp 98  F (36.7  C) (Oral)   Resp 16   Ht 1.543 m (5' 0.75\")   Wt 85.1 kg (187 lb 9.6 oz)   SpO2 98%   BMI 35.74 kg/m      Patient is alert, in no obvious distress.   Skin: Warm, dry.    Neck: Supple, no lymphadenopathy. No thyromegaly.  Lungs:  Clear to auscultation. Respirations even and unlabored.  No wheezing or rales noted.   Heart:  Regular rate and rhythm.  No murmurs.   Musculoskeletal:  Monofilament testing is normal bilaterally.         Answers for HPI/ROS submitted by the patient on 6/6/2023  If you checked off any problems, how difficult have these problems made it for you to do your work, take care of things at home, or get along with other people?: Not difficult at all  PHQ9 TOTAL SCORE: 3  Frequency of checking blood sugars:: one time daily  What time of day are you checking your blood sugars : before meals  Have you had any blood sugars above 200?: No  Have you had any blood sugars below 70?: No  Hypoglycemia symptoms:: shakiness  Diabetic concerns:: none  Paraesthesia present:: none of these symptoms  How many servings of fruits and vegetables do you eat daily?: 0-1  On average, how many sweetened beverages do you drink each day (Examples: soda, juice, sweet tea, etc.  Do NOT count diet or artificially sweetened beverages)?: 0  How many minutes a day do you exercise enough to make your heart beat faster?: 10 to 19  How many days a week do you exercise enough to make your heart beat faster?: 3 or less  How many days per week do you miss taking your medication?: 0      "

## 2023-06-07 DIAGNOSIS — E11.65 TYPE 2 DIABETES MELLITUS WITH HYPERGLYCEMIA, WITHOUT LONG-TERM CURRENT USE OF INSULIN (H): ICD-10-CM

## 2023-06-08 RX ORDER — PEN NEEDLE, DIABETIC 32GX 5/32"
NEEDLE, DISPOSABLE MISCELLANEOUS
Qty: 100 EACH | Refills: 1 | Status: SHIPPED | OUTPATIENT
Start: 2023-06-08 | End: 2023-11-03

## 2023-06-08 NOTE — TELEPHONE ENCOUNTER
"Last Written Prescription Date:  12/7/22  Last Fill Quantity: 100,  # refills: 1   Last office visit provider:  6/6/23     Requested Prescriptions   Pending Prescriptions Disp Refills     BD PEN NEEDLE IVORY 2ND GEN 32G X 4 MM miscellaneous [Pharmacy Med Name: BD IVORY 2 GEN PEN NDL 32G 4MM] 100 each 1     Sig: USE 1 NEEDLE TO INJECT UNDER THE SKIN DAILY.       Diabetic Supplies Protocol Passed - 6/7/2023  2:13 PM        Passed - Medication is active on med list        Passed - Patient is 18 years of age or older        Passed - Recent (6 mo) or future (30 days) visit within the authorizing provider's specialty     Patient had office visit in the last 6 months or has a visit in the next 30 days with authorizing provider.  See \"Patient Info\" tab in inbasket, or \"Choose Columns\" in Meds & Orders section of the refill encounter.                 SUZANNE CERDA RN 06/08/23 11:47 AM  "

## 2023-06-30 ENCOUNTER — TELEPHONE (OUTPATIENT)
Dept: FAMILY MEDICINE | Facility: CLINIC | Age: 58
End: 2023-06-30
Payer: COMMERCIAL

## 2023-06-30 DIAGNOSIS — Z00.00 HEALTH CARE MAINTENANCE: Primary | ICD-10-CM

## 2023-06-30 NOTE — TELEPHONE ENCOUNTER
Patient is coming in on Thursday 7/6/23 for Hep Immunization. Unsure if she is needing Hep A or B or both. Needing orders. Thank you.

## 2023-07-06 ENCOUNTER — ALLIED HEALTH/NURSE VISIT (OUTPATIENT)
Dept: FAMILY MEDICINE | Facility: CLINIC | Age: 58
End: 2023-07-06
Payer: COMMERCIAL

## 2023-07-06 DIAGNOSIS — Z23 ENCOUNTER FOR IMMUNIZATION: Primary | ICD-10-CM

## 2023-07-06 DIAGNOSIS — Z00.00 HEALTH CARE MAINTENANCE: ICD-10-CM

## 2023-07-06 PROCEDURE — 90746 HEPB VACCINE 3 DOSE ADULT IM: CPT

## 2023-07-06 PROCEDURE — 99207 PR NO CHARGE NURSE ONLY: CPT

## 2023-07-06 PROCEDURE — 90471 IMMUNIZATION ADMIN: CPT

## 2023-07-06 NOTE — PROGRESS NOTES
Prior to immunization administration, verified patients identity using patient s name and date of birth. Please see Immunization Activity for additional information.     Screening Questionnaire for Adult Immunization    Are you sick today?   No   Do you have allergies to medications, food, a vaccine component or latex?   No   Have you ever had a serious reaction after receiving a vaccination?   No   Do you have a long-term health problem with heart, lung, kidney, or metabolic disease (e.g., diabetes), asthma, a blood disorder, no spleen, complement component deficiency, a cochlear implant, or a spinal fluid leak?  Are you on long-term aspirin therapy?   No   Do you have cancer, leukemia, HIV/AIDS, or any other immune system problem?   No   Do you have a parent, brother, or sister with an immune system problem?   No   In the past 3 months, have you taken medications that affect  your immune system, such as prednisone, other steroids, or anticancer drugs; drugs for the treatment of rheumatoid arthritis, Crohn s disease, or psoriasis; or have you had radiation treatments?   No   Have you had a seizure, or a brain or other nervous system problem?   No   During the past year, have you received a transfusion of blood or blood    products, or been given immune (gamma) globulin or antiviral drug?   No   For women: Are you pregnant or is there a chance you could become       pregnant during the next month?   No   Have you received any vaccinations in the past 4 weeks?   No     Immunization questionnaire answers were all negative.    I have reviewed the following standing orders:   This patient is due and qualifies for the Hepatitis B vaccine.    Click here for Hepatitis B Standing Order    I have reviewed the vaccines inclusion and exclusion criteria; No concerns regarding eligibility.         Patient instructed to remain in clinic for 15 minutes afterwards, and to report any adverse reactions.     Screening performed by  Gabrielle Dang MA on 7/6/2023 at 9:06 AM.

## 2023-07-14 ENCOUNTER — MYC MEDICAL ADVICE (OUTPATIENT)
Dept: FAMILY MEDICINE | Facility: CLINIC | Age: 58
End: 2023-07-14
Payer: COMMERCIAL

## 2023-07-18 ENCOUNTER — TELEPHONE (OUTPATIENT)
Dept: FAMILY MEDICINE | Facility: CLINIC | Age: 58
End: 2023-07-18

## 2023-07-18 ENCOUNTER — OFFICE VISIT (OUTPATIENT)
Dept: FAMILY MEDICINE | Facility: CLINIC | Age: 58
End: 2023-07-18
Payer: COMMERCIAL

## 2023-07-18 VITALS
DIASTOLIC BLOOD PRESSURE: 84 MMHG | OXYGEN SATURATION: 97 % | RESPIRATION RATE: 14 BRPM | HEART RATE: 94 BPM | TEMPERATURE: 98.4 F | SYSTOLIC BLOOD PRESSURE: 134 MMHG

## 2023-07-18 DIAGNOSIS — H69.92 EUSTACHIAN TUBE DYSFUNCTION, LEFT: ICD-10-CM

## 2023-07-18 DIAGNOSIS — H81.12 BENIGN PAROXYSMAL POSITIONAL VERTIGO OF LEFT EAR: Primary | ICD-10-CM

## 2023-07-18 PROCEDURE — 99214 OFFICE O/P EST MOD 30 MIN: CPT | Performed by: PHYSICIAN ASSISTANT

## 2023-07-18 RX ORDER — MECLIZINE HYDROCHLORIDE 25 MG/1
25 TABLET ORAL 3 TIMES DAILY PRN
Qty: 30 TABLET | Refills: 0 | Status: SHIPPED | OUTPATIENT
Start: 2023-07-18 | End: 2023-07-28

## 2023-07-18 RX ORDER — CETIRIZINE HYDROCHLORIDE 10 MG/1
10 TABLET ORAL DAILY
Qty: 30 TABLET | Refills: 0 | Status: SHIPPED | OUTPATIENT
Start: 2023-07-18 | End: 2023-08-17

## 2023-07-18 RX ORDER — FLUTICASONE PROPIONATE 50 MCG
1 SPRAY, SUSPENSION (ML) NASAL DAILY
Qty: 9.9 ML | Refills: 0 | Status: SHIPPED | OUTPATIENT
Start: 2023-07-18 | End: 2023-08-17

## 2023-07-18 NOTE — PATIENT INSTRUCTIONS
Use of over-the-counter Zyrtec.  Follow packaging directions  Use of over-the-counter Flonase  Frequent swallowing drinking and chewing gum to help create low-grade suction on the eustachian tube.  Elevate head at nighttime so it does not increase pressure  Use of over-the-counter Tylenol as needed for comfort.  Return to primary care provider for reevaluation treatment if any complication or new symptoms should present.        Patient Education     Earache, No Infection (Adult)  Earaches can happen without an infection. This occurs when air and fluid build up behind the eardrum causing a feeling of fullness and discomfort and reduced hearing. This is called otitis media with effusion (OME) or serous otitis media. It means there is fluid in the middle ear. It is not the same as acute otitis media, which is typically from infection.  OME can happen when you have a cold if congestion blocks the passage that drains the middle ear. This passage is called the eustachian tube. OME may also occur with nasal allergies or after a bacterial middle ear infection.    The pain or discomfort may come and go. You may hear clicking or popping sounds when you chew or swallow. You may feel that your balance is off. Or you may hear ringing in the ear.  It often takes from several weeks up to 3 months for the fluid to clear on its own. Oral pain relievers and ear drops help if there is pain. Decongestants and antihistamines sometimes help. Antibiotics don't help since there is no infection. Your doctor may prescribe a nasal spray to help reduce swelling in the nose and eustachian tube. This can allow the ear to drain.  If your OME doesn't improve after 3 months, surgery may be used to drain the fluid and insert a small tube in the eardrum to allow continued drainage.  Because the middle ear fluid can become infected, it is important to watch for signs of an ear infection which may develop later. These signs include increased ear pain,  fever, or drainage from the ear.  Home care  The following guidelines will help you care for yourself at home:  You may use over-the-counter medicine as directed to control pain, unless another medicine was prescribed. If you have chronic liver or kidney disease or ever had a stomach ulcer or GI bleeding, talk with your doctor before using these medicines. Aspirin should never be used in anyone under 18 years of age who is ill with a fever. It may cause severe liver damage.  You may use over-the-counter decongestants such as phenylephrine or pseudoephedrine. But they are not always helpful. Don't use nasal spray decongestants more than 3 days. Longer use can make congestion worse. Prescription nasal sprays from your doctor don't typically have those restrictions.  Antihistamines may help if you are also having allergy symptoms.  You may use medicines such as guaifenesin to thin mucus and promote drainage.  Follow-up care  Follow up with your healthcare provider or as advised if you are not feeling better after 3 days.  When to seek medical advice  Call your healthcare provider right away if any of the following occur:  Your ear pain gets worse or does not start to improve   Fever of 100.4 F (38 C) or higher, or as directed by your healthcare provider  Fluid or blood draining from the ear  Headache or sinus pain  Stiff neck  Unusual drowsiness or confusion  Date Last Reviewed: 10/1/2016    4647-7724 [employer]. [address] [place] [id number]. All rights reserved. This information is not intended as a substitute for professional medical care. Always follow your healthcare professional's instructions.

## 2023-07-18 NOTE — PROGRESS NOTES
Patient presents with:  Ear Problem: Started with nausea and vomiting last night  has throbbing ear pain off and on  feels off balance      Clinical Decision Making:  Patient had eustachian tube dysfunction and ear effusion on the left ear.  Patient also had BPPV on the left side.  Patient is treated with Zyrtec and Flonase for eustachian tube dysfunction and ear effusion and Antivert for the BPPV. Expected course of resolution and indication for return was gone over and questions were answered to patient/parent's satisfaction before discharge.        ICD-10-CM    1. Benign paroxysmal positional vertigo of left ear  H81.12 meclizine (ANTIVERT) 25 MG tablet      2. Eustachian tube dysfunction, left  H69.82 cetirizine (ZYRTEC) 10 MG tablet     fluticasone (FLONASE) 50 MCG/ACT nasal spray          Patient Instructions   Use of over-the-counter Zyrtec.  Follow packaging directions  Use of over-the-counter Flonase  Frequent swallowing drinking and chewing gum to help create low-grade suction on the eustachian tube.  Elevate head at nighttime so it does not increase pressure  Use of over-the-counter Tylenol as needed for comfort.  Return to primary care provider for reevaluation treatment if any complication or new symptoms should present.        Patient Education     Earache, No Infection (Adult)  Earaches can happen without an infection. This occurs when air and fluid build up behind the eardrum causing a feeling of fullness and discomfort and reduced hearing. This is called otitis media with effusion (OME) or serous otitis media. It means there is fluid in the middle ear. It is not the same as acute otitis media, which is typically from infection.  OME can happen when you have a cold if congestion blocks the passage that drains the middle ear. This passage is called the eustachian tube. OME may also occur with nasal allergies or after a bacterial middle ear infection.    The pain or discomfort may come and go. You may  hear clicking or popping sounds when you chew or swallow. You may feel that your balance is off. Or you may hear ringing in the ear.  It often takes from several weeks up to 3 months for the fluid to clear on its own. Oral pain relievers and ear drops help if there is pain. Decongestants and antihistamines sometimes help. Antibiotics don't help since there is no infection. Your doctor may prescribe a nasal spray to help reduce swelling in the nose and eustachian tube. This can allow the ear to drain.  If your OME doesn't improve after 3 months, surgery may be used to drain the fluid and insert a small tube in the eardrum to allow continued drainage.  Because the middle ear fluid can become infected, it is important to watch for signs of an ear infection which may develop later. These signs include increased ear pain, fever, or drainage from the ear.  Home care  The following guidelines will help you care for yourself at home:    You may use over-the-counter medicine as directed to control pain, unless another medicine was prescribed. If you have chronic liver or kidney disease or ever had a stomach ulcer or GI bleeding, talk with your doctor before using these medicines. Aspirin should never be used in anyone under 18 years of age who is ill with a fever. It may cause severe liver damage.    You may use over-the-counter decongestants such as phenylephrine or pseudoephedrine. But they are not always helpful. Don't use nasal spray decongestants more than 3 days. Longer use can make congestion worse. Prescription nasal sprays from your doctor don't typically have those restrictions.    Antihistamines may help if you are also having allergy symptoms.    You may use medicines such as guaifenesin to thin mucus and promote drainage.  Follow-up care  Follow up with your healthcare provider or as advised if you are not feeling better after 3 days.  When to seek medical advice  Call your healthcare provider right away if any  of the following occur:    Your ear pain gets worse or does not start to improve     Fever of 100.4 F (38 C) or higher, or as directed by your healthcare provider    Fluid or blood draining from the ear    Headache or sinus pain    Stiff neck    Unusual drowsiness or confusion  Date Last Reviewed: 10/1/2016    7966-7185 [employer]. [address] [place] [address]. All rights reserved. This information is not intended as a substitute for professional medical care. Always follow your healthcare professional's instructions.                 HPI:  Josefina Damico is a 57 year old female who presents today for congestion in the left ear.  Patient has had muffled hearing but no otorrhea or balance deficits.  Patient has had difficulty with change of position from lying sitting to standing and changing her head from left to right causes slight off-balance feeling.  Patient has had some nausea and 1 episode of vomiting but has not persisted.  Is not associated with stomach upset stomach pain or diarrhea.  Has had no fever chills or night sweats.     History obtained from chart review and the patient.    Problem List:  2023-06: Major depression, recurrent (H)  2022-07: Major depression, single episode  2019-12: Incarcerated umbilical hernia  2018-08: Morbid obesity (H)  2017-10: Type 2 diabetes mellitus with hyperglycemia, without long-  term current use of insulin (H)  2017-10: Hyperlipidemia, unspecified hyperlipidemia type  2017-10: Edema, unspecified type      Past Medical History:   Diagnosis Date     Diabetes mellitus (H)      Environmental allergies      Hyperlipidemia        Social History     Tobacco Use     Smoking status: Never     Smokeless tobacco: Never   Substance Use Topics     Alcohol use: Yes     Alcohol/week: 1.0 standard drink of alcohol       Review of Systems  As above in HPI otherwise negative.    Vitals:    07/18/23 1716   BP: 134/84   Pulse: 94   Resp: 14   Temp: 98.4  F (36.9   C)   TempSrc: Oral   SpO2: 97%       General: Patient is resting comfortably no acute distress is afebrile  HEENT: Head is normocephalic atraumatic   eyes are PERRL EOMI sclera anicteric no noted nystagmus  TMs are with fluid the middle ear on the left.  Throat is with mild pharyngeal wall erythema and no exudate  No cervical lymphadenopathy present  HINTs exam was negative for nystagmus.   LUNGS: Clear to auscultation bilaterally  HEART: Regular rate and rhythm  Skin: Without rash non-diaphoretic  Neuro: Romberg is negative.  Patient has good strength in bilateral upper and lower extremities 5/5 and equal bilaterally    Physical Exam      At the end of the encounter, I discussed results, diagnosis, medications. Discussed red flags for immediate return to clinic/ER, as well as indications for follow up if no improvement. Patient understood and agreed to plan. Patient was stable for discharge.

## 2023-07-18 NOTE — TELEPHONE ENCOUNTER
Per patient:  The new rule goes into effect on August 1, 2023. I currently have two months of medication left. The letter states you can go to [email or web address] to request an exemption online.     Please obtain a prior auth for Trulicity 3mg every 7 days  DX:[id number]

## 2023-07-19 DIAGNOSIS — F32.0 CURRENT MILD EPISODE OF MAJOR DEPRESSIVE DISORDER WITHOUT PRIOR EPISODE (H): ICD-10-CM

## 2023-07-19 RX ORDER — BUPROPION HYDROCHLORIDE 150 MG/1
TABLET ORAL
Qty: 90 TABLET | Refills: 1 | Status: SHIPPED | OUTPATIENT
Start: 2023-07-19 | End: 2024-01-15

## 2023-07-19 NOTE — TELEPHONE ENCOUNTER
"  Last Written Prescription Date:  7/7/2022  Last Fill Quantity: 90,  # refills: 3   Last office visit provider:  6/6/2023     Requested Prescriptions   Pending Prescriptions Disp Refills    buPROPion (WELLBUTRIN XL) 150 MG 24 hr tablet [Pharmacy Med Name: BUPROPION HCL  MG TABLET] 90 tablet 3     Sig: TAKE 1 TABLET BY MOUTH EVERY MORNING       SSRIs Protocol Passed - 7/19/2023  2:09 PM        Passed - PHQ-9 score less than 5 in past 6 months     Please review last PHQ-9 score.           Passed - Medication is Bupropion     If the medication is Bupropion (Wellbutrin), and the patient is taking for smoking cessation; OK to refill.          Passed - Medication is active on med list        Passed - Patient is age 18 or older        Passed - No active pregnancy on record        Passed - No positive pregnancy test in last 12 months        Passed - Recent (6 mo) or future (30 days) visit within the authorizing provider's specialty     Patient had office visit in the last 6 months or has a visit in the next 30 days with authorizing provider or within the authorizing provider's specialty.  See \"Patient Info\" tab in inbasket, or \"Choose Columns\" in Meds & Orders section of the refill encounter.                 Yumiko Triplett RN 07/19/23 2:10 PM  "

## 2023-07-20 NOTE — TELEPHONE ENCOUNTER
Prior Authorization Not Needed per Insurance    Medication: TRULICITY 3 MG/0.5ML SC SOPN  Insurance Company: [employer] [place] - Phone [phone] Fax [phone]  Expected CoPay:      Pharmacy Filling the Rx:    Pharmacy Notified: Yes  Patient Notified: Yes

## 2023-07-24 DIAGNOSIS — E11.65 TYPE 2 DIABETES MELLITUS WITH HYPERGLYCEMIA, WITHOUT LONG-TERM CURRENT USE OF INSULIN (H): ICD-10-CM

## 2023-07-24 RX ORDER — BLOOD SUGAR DIAGNOSTIC
STRIP MISCELLANEOUS
Qty: 100 STRIP | Refills: 1 | Status: SHIPPED | OUTPATIENT
Start: 2023-07-24 | End: 2024-02-05

## 2023-07-24 NOTE — TELEPHONE ENCOUNTER
"Last Written Prescription Date:  4/17/23  Last Fill Quantity: 100,  # refills: 0   Last office visit provider:  6/6/23     Requested Prescriptions   Pending Prescriptions Disp Refills    ACCU-CHEK GUIDE test strip [Pharmacy Med Name: ACCU-CHEK GUIDE TEST STRIP] 100 strip 0     Sig: USE 1 EACH AS DIRECTED DAILY       Diabetic Supplies Protocol Passed - 7/24/2023 12:53 AM        Passed - Medication is active on med list        Passed - Patient is 18 years of age or older        Passed - Recent (6 mo) or future (30 days) visit within the authorizing provider's specialty     Patient had office visit in the last 6 months or has a visit in the next 30 days with authorizing provider.  See \"Patient Info\" tab in inbasket, or \"Choose Columns\" in Meds & Orders section of the refill encounter.                 Tyrone Gan RN 07/24/23 8:44 AM  "

## 2023-07-25 DIAGNOSIS — E11.65 TYPE 2 DIABETES MELLITUS WITH HYPERGLYCEMIA, WITHOUT LONG-TERM CURRENT USE OF INSULIN (H): ICD-10-CM

## 2023-07-25 NOTE — TELEPHONE ENCOUNTER
"  Last Written Prescription Date:  12/2/2022  Last Fill Quantity: 360,  # refills: 1   Last office visit provider:  6/6/2023     Requested Prescriptions   Pending Prescriptions Disp Refills    metFORMIN (GLUCOPHAGE) 500 MG tablet [Pharmacy Med Name: METFORMIN  MG TABLET] 360 tablet 1     Sig: TAKE 2 TABLETS (1,000 MG) BY MOUTH 2 TIMES DAILY WITH MEALS.       Biguanide Agents Passed - 7/25/2023 12:51 AM        Passed - Patient is age 10 or older        Passed - Patient has documented A1c within the specified period of time.     If HgbA1C is 8 or greater, it needs to be on file within the past 3 months.  If less than 8, must be on file within the past 6 months.     Recent Labs   Lab Test 06/06/23  0935   A1C 6.2*             Passed - Patient's CR is NOT>1.4 OR Patient's EGFR is NOT<45 within past 12 mos.     Recent Labs   Lab Test 06/06/23  0935 02/04/22  0910 10/26/20  0808   GFRESTIMATED 73   < > >60   GFRESTBLACK  --   --  >60    < > = values in this interval not displayed.       Recent Labs   Lab Test 06/06/23  0935   CR 0.92             Passed - Patient does NOT have a diagnosis of CHF.        Passed - Medication is active on med list        Passed - Patient is not pregnant        Passed - Patient has not had a positive pregnancy test within the past 12 mos.         Passed - Recent (6 mo) or future (30 days) visit within the authorizing provider's specialty     Patient had office visit in the last 6 months or has a visit in the next 30 days with authorizing provider or within the authorizing provider's specialty.  See \"Patient Info\" tab in inbasket, or \"Choose Columns\" in Meds & Orders section of the refill encounter.                 Jennie Encinas RN 07/25/23 11:01 AM  "

## 2023-07-26 NOTE — TELEPHONE ENCOUNTER
"See telephone call notes from 7/20/23 from the Central Prior Authorization Team:    \"Prior authorization is not needed at this time. If the pharmacy gets a rejection in the future they can contact us to submit a PA request.  Thank You  Central Prior Authorization Team  Phone: [phone]\"    See EVIIVO message from the patient on 7/14/23:    \"I received a letter today saying Trulicity (also - Bydureon, Ijeoma, Mounjaro, Rybelsus, and Victoza) needs to have prior authorization before it can be refilled. The new rule goes into effect on August 1, 2023. I currently have two months of medication left. The letter states you can go to [email or web address] to request an exemption online.\"    Writer sent a EVIIVO message to the patient on 7/26/23 with the response from the Central Prior Authorization Team above on 7/20/23.    Please route to the RN queue for any questions or concerns.    Vianey Richard RN, BSN  [facility]    "

## 2023-08-15 DIAGNOSIS — E11.65 TYPE 2 DIABETES MELLITUS WITH HYPERGLYCEMIA, WITHOUT LONG-TERM CURRENT USE OF INSULIN (H): Primary | ICD-10-CM

## 2023-08-15 RX ORDER — FLASH GLUCOSE SENSOR
KIT MISCELLANEOUS
Qty: 2 EACH | Refills: 11 | Status: SHIPPED | OUTPATIENT
Start: 2023-08-15 | End: 2023-12-12

## 2023-08-30 ENCOUNTER — TRANSFERRED RECORDS (OUTPATIENT)
Dept: HEALTH INFORMATION MANAGEMENT | Facility: CLINIC | Age: 58
End: 2023-08-30
Payer: COMMERCIAL

## 2023-08-30 LAB — RETINOPATHY: NEGATIVE

## 2023-09-01 DIAGNOSIS — E78.2 MIXED HYPERLIPIDEMIA: ICD-10-CM

## 2023-09-01 RX ORDER — PRAVASTATIN SODIUM 80 MG/1
80 TABLET ORAL EVERY EVENING
Qty: 90 TABLET | Refills: 2 | Status: SHIPPED | OUTPATIENT
Start: 2023-09-01 | End: 2024-01-04 | Stop reason: ALTCHOICE

## 2023-09-01 NOTE — TELEPHONE ENCOUNTER
"Routing refill request to provider for review/approval because:  Pcp to fill    Last Written Prescription Date:  12/11/22  Last Fill Quantity: 90,  # refills: 2   Last office visit provider:  7/18/23     Requested Prescriptions   Pending Prescriptions Disp Refills    pravastatin (PRAVACHOL) 80 MG tablet [Pharmacy Med Name: PRAVASTATIN SODIUM 80 MG TAB] 90 tablet 2     Sig: TAKE 1 TABLET BY MOUTH EVERY DAY IN THE EVENING       Statins Protocol Passed - 9/1/2023  2:44 AM        Passed - LDL on file in past 12 months     Recent Labs   Lab Test 06/06/23  0935   LDL 86             Passed - No abnormal creatine kinase in past 12 months     No lab results found.             Passed - Recent (12 mo) or future (30 days) visit within the authorizing provider's specialty     Patient has had an office visit with the authorizing provider or a provider within the authorizing providers department within the previous 12 mos or has a future within next 30 days. See \"Patient Info\" tab in inbasket, or \"Choose Columns\" in Meds & Orders section of the refill encounter.              Passed - Medication is active on med list        Passed - Patient is age 18 or older        Passed - No active pregnancy on record        Passed - No positive pregnancy test in past 12 months             Rita Cleaning RN 09/01/23 2:45 AM  "

## 2023-11-03 ENCOUNTER — IMMUNIZATION (OUTPATIENT)
Dept: NURSING | Facility: CLINIC | Age: 58
End: 2023-11-03
Payer: COMMERCIAL

## 2023-11-03 DIAGNOSIS — E11.65 TYPE 2 DIABETES MELLITUS WITH HYPERGLYCEMIA, WITHOUT LONG-TERM CURRENT USE OF INSULIN (H): ICD-10-CM

## 2023-11-03 PROCEDURE — 91320 SARSCV2 VAC 30MCG TRS-SUC IM: CPT

## 2023-11-03 PROCEDURE — 90471 IMMUNIZATION ADMIN: CPT

## 2023-11-03 PROCEDURE — 90682 RIV4 VACC RECOMBINANT DNA IM: CPT

## 2023-11-03 PROCEDURE — 90480 ADMN SARSCOV2 VAC 1/ONLY CMP: CPT

## 2023-11-03 RX ORDER — PEN NEEDLE, DIABETIC 32GX 5/32"
NEEDLE, DISPOSABLE MISCELLANEOUS
Qty: 100 EACH | Refills: 2 | Status: SHIPPED | OUTPATIENT
Start: 2023-11-03 | End: 2024-08-30

## 2023-12-12 ENCOUNTER — OFFICE VISIT (OUTPATIENT)
Dept: FAMILY MEDICINE | Facility: CLINIC | Age: 58
End: 2023-12-12
Payer: COMMERCIAL

## 2023-12-12 VITALS
RESPIRATION RATE: 17 BRPM | OXYGEN SATURATION: 98 % | HEIGHT: 61 IN | TEMPERATURE: 98.2 F | DIASTOLIC BLOOD PRESSURE: 82 MMHG | BODY MASS INDEX: 35.51 KG/M2 | WEIGHT: 188.1 LBS | HEART RATE: 83 BPM | SYSTOLIC BLOOD PRESSURE: 127 MMHG

## 2023-12-12 DIAGNOSIS — E78.1 HYPERTRIGLYCERIDEMIA: ICD-10-CM

## 2023-12-12 DIAGNOSIS — E78.00 HYPERCHOLESTEREMIA: Primary | ICD-10-CM

## 2023-12-12 DIAGNOSIS — E11.65 TYPE 2 DIABETES MELLITUS WITH HYPERGLYCEMIA, WITHOUT LONG-TERM CURRENT USE OF INSULIN (H): ICD-10-CM

## 2023-12-12 LAB — HBA1C MFR BLD: 6.2 % (ref 0–5.6)

## 2023-12-12 PROCEDURE — 80061 LIPID PANEL: CPT | Performed by: FAMILY MEDICINE

## 2023-12-12 PROCEDURE — 82043 UR ALBUMIN QUANTITATIVE: CPT | Performed by: FAMILY MEDICINE

## 2023-12-12 PROCEDURE — 82570 ASSAY OF URINE CREATININE: CPT | Performed by: FAMILY MEDICINE

## 2023-12-12 PROCEDURE — 90632 HEPA VACCINE ADULT IM: CPT | Performed by: FAMILY MEDICINE

## 2023-12-12 PROCEDURE — 90471 IMMUNIZATION ADMIN: CPT | Performed by: FAMILY MEDICINE

## 2023-12-12 PROCEDURE — 90746 HEPB VACCINE 3 DOSE ADULT IM: CPT | Performed by: FAMILY MEDICINE

## 2023-12-12 PROCEDURE — 36415 COLL VENOUS BLD VENIPUNCTURE: CPT | Performed by: FAMILY MEDICINE

## 2023-12-12 PROCEDURE — 99214 OFFICE O/P EST MOD 30 MIN: CPT | Mod: 25 | Performed by: FAMILY MEDICINE

## 2023-12-12 PROCEDURE — 90472 IMMUNIZATION ADMIN EACH ADD: CPT | Performed by: FAMILY MEDICINE

## 2023-12-12 PROCEDURE — 83036 HEMOGLOBIN GLYCOSYLATED A1C: CPT | Performed by: FAMILY MEDICINE

## 2023-12-12 RX ORDER — DULAGLUTIDE 3 MG/.5ML
3 INJECTION, SOLUTION SUBCUTANEOUS
Qty: 6 ML | Refills: 4 | Status: SHIPPED | OUTPATIENT
Start: 2023-12-12 | End: 2024-04-04 | Stop reason: DRUGHIGH

## 2023-12-12 RX ORDER — FLASH GLUCOSE SENSOR
KIT MISCELLANEOUS
Qty: 2 EACH | Refills: 11 | Status: SHIPPED | OUTPATIENT
Start: 2023-12-12

## 2023-12-12 ASSESSMENT — PAIN SCALES - GENERAL: PAINLEVEL: NO PAIN (0)

## 2023-12-12 ASSESSMENT — PATIENT HEALTH QUESTIONNAIRE - PHQ9
SUM OF ALL RESPONSES TO PHQ QUESTIONS 1-9: 1
SUM OF ALL RESPONSES TO PHQ QUESTIONS 1-9: 1
10. IF YOU CHECKED OFF ANY PROBLEMS, HOW DIFFICULT HAVE THESE PROBLEMS MADE IT FOR YOU TO DO YOUR WORK, TAKE CARE OF THINGS AT HOME, OR GET ALONG WITH OTHER PEOPLE: SOMEWHAT DIFFICULT

## 2023-12-12 NOTE — PROGRESS NOTES
"  Assessment & Plan     Type 2 diabetes mellitus with hyperglycemia, without long-term current use of insulin (H)  Workup to include  - Continuous Blood Gluc Sensor (FREESTYLE EDUIN 14 DAY SENSOR) MISC; Change every 14 days.  - Dulaglutide (TRULICITY) 3 MG/0.5ML SOPN; Inject 3 mg Subcutaneous every 7 days  - AMB Adult Diabetes Educator Referral; Future  Continue metformin Tresiba Trulicity  Hypercholesteremia  Continue pravastatin    Hypertriglyceridemia  Continue her Tricor             BMI:   Estimated body mass index is 36.02 kg/m  as calculated from the following:    Height as of this encounter: 1.539 m (5' 0.59\").    Weight as of this encounter: 85.3 kg (188 lb 1.6 oz).           Kel Harkins MD  [facility]    Saran Casiano is a 58 year old, presenting for the following health issues:  Diabetes (Follow up - Hep series shots for the last one )      12/12/2023     8:03 AM   Additional Questions   Roomed by erin LEE   Patient is being seen for follow-up of her type 2 diabetes.  She does have a glucose monitor and needs refills of her sensors.  Her blood sugars are excellent they are running between 110 140.  Will repeat an A1c albumin lipid profile today.  Weight has been stable.  She it is difficult for her to exercise because of her location.  She does not have a treadmill.  Her T Tresiba has been discontinued and she will need a prior Auth.  It has been a long time since she has had diabetes education.  I will place a fresh consult and perhaps maybe even over the phone consultation with Evonne may suffice.  I am seeing her in place of [facility] today.  Tresiba apparently is getting difficult to obtain because of off label use for weight control but in this case the patient is using this as designed and this is a tragedy.  Very nice patient cooperating with treatment excellent control.  All medical questions asked were answered.  We have cleared all of her prescriptions and " "will keep these as active as we can she will see her provider once again in 6 months.            Review of Systems   Constitutional, HEENT, cardiovascular, pulmonary, gi and gu systems are negative, except as otherwise noted.      Objective    /82 (BP Location: Right arm, Patient Position: Sitting, Cuff Size: Adult Regular)   Pulse 83   Temp 98.2  F (36.8  C) (Oral)   Resp 17   Ht 1.539 m (5' 0.59\")   Wt 85.3 kg (188 lb 1.6 oz)   SpO2 98%   BMI 36.02 kg/m    Body mass index is 36.02 kg/m .  Physical Exam   GENERAL: healthy, alert and no distress  NECK: no adenopathy, no asymmetry, masses, or scars and thyroid normal to palpation  RESP: lungs clear to auscultation - no rales, rhonchi or wheezes  CV: regular rate and rhythm, normal S1 S2, no S3 or S4, no murmur, click or rub, no peripheral edema and peripheral pulses strong  ABDOMEN: soft, nontender, no hepatosplenomegaly, no masses and bowel sounds normal  MS: no gross musculoskeletal defects noted, no edema    Monofilament test normal                  "

## 2023-12-13 LAB
CHOLEST SERPL-MCNC: 176 MG/DL
CREAT UR-MCNC: 163 MG/DL
FASTING STATUS PATIENT QL REPORTED: YES
HDLC SERPL-MCNC: 45 MG/DL
LDLC SERPL CALC-MCNC: 95 MG/DL
MICROALBUMIN UR-MCNC: <12 MG/L
MICROALBUMIN/CREAT UR: NORMAL MG/G{CREAT}
NONHDLC SERPL-MCNC: 131 MG/DL
TRIGL SERPL-MCNC: 178 MG/DL

## 2024-01-04 ENCOUNTER — TELEPHONE (OUTPATIENT)
Dept: FAMILY MEDICINE | Facility: CLINIC | Age: 59
End: 2024-01-04
Payer: COMMERCIAL

## 2024-01-04 ENCOUNTER — OFFICE VISIT (OUTPATIENT)
Dept: PHARMACY | Facility: CLINIC | Age: 59
End: 2024-01-04
Payer: COMMERCIAL

## 2024-01-04 VITALS — HEART RATE: 79 BPM | SYSTOLIC BLOOD PRESSURE: 150 MMHG | DIASTOLIC BLOOD PRESSURE: 77 MMHG

## 2024-01-04 DIAGNOSIS — E11.65 TYPE 2 DIABETES MELLITUS WITH HYPERGLYCEMIA, WITHOUT LONG-TERM CURRENT USE OF INSULIN (H): ICD-10-CM

## 2024-01-04 DIAGNOSIS — R03.0 ELEVATED BLOOD PRESSURE READING WITHOUT DIAGNOSIS OF HYPERTENSION: ICD-10-CM

## 2024-01-04 DIAGNOSIS — Z78.9 TAKES DIETARY SUPPLEMENTS: ICD-10-CM

## 2024-01-04 DIAGNOSIS — R60.9 EDEMA, UNSPECIFIED TYPE: ICD-10-CM

## 2024-01-04 DIAGNOSIS — J30.89 SEASONAL ALLERGIC RHINITIS DUE TO OTHER ALLERGIC TRIGGER: ICD-10-CM

## 2024-01-04 DIAGNOSIS — E78.5 HYPERLIPIDEMIA, UNSPECIFIED HYPERLIPIDEMIA TYPE: Primary | ICD-10-CM

## 2024-01-04 DIAGNOSIS — F32.0 CURRENT MILD EPISODE OF MAJOR DEPRESSIVE DISORDER WITHOUT PRIOR EPISODE (H): ICD-10-CM

## 2024-01-04 PROCEDURE — 99207 PR NO CHARGE LOS: CPT

## 2024-01-04 RX ORDER — ROSUVASTATIN CALCIUM 20 MG/1
20 TABLET, COATED ORAL DAILY
Qty: 90 TABLET | Refills: 3 | Status: SHIPPED | OUTPATIENT
Start: 2024-01-04 | End: 2024-07-10

## 2024-01-04 NOTE — PROGRESS NOTES
"Medication Therapy Management (MTM) Encounter    ASSESSMENT:                            Medication Adherence/Access: Will send prior authorization request to PA team for Trulicity, suspect that patient would need to prove \"step therapy\" or prior use of metformin to have Trulicity covered.     Diabetes: Discussed today that patient's blood sugars are well controlled as recent A1c is at goal of <7% and time in target range for the past 2 weeks has been at goal of >70%. Discussed today that patient could consider a dose increase of Trulicity to help with weight loss support or consider changing to a more effective GLP1 such as Ozempic or Mounjaro. Patient declined recommendation today but may consider if blood sugars rise above goal in the future.     Elevated blood pressure/edema: Patient's blood pressure is above goal of <130/80 today likely due to current life stressors, no medication adjustments made today but will re-check blood pressure at follow up.     Hyperlipidemia: Given LDL above goal of <70, would benefit from changing current statin therapy to a high intensity statin. Will obtain updated cholesterol labs at follow up to assess efficacy.     Depression: Stable, most recent PHQ9 at goal. Discussed that there is option to increase dose of bupropion in future if mood were to worsen.     Allergies: Educated today that patient may consider a trial of an alternative second generation allergy medication to see if this is more effective than loratadine.     Supplements: Will plan to obtain updated vitamin D levels at follow up to assess efficacy of current vitamin D dose.     PLAN:                            I will start prior authorization for Trulicity     Stop pravastatin and start rosuvastatin 20mg daily     Consider adjusting allergy medication seasonally or yearly to see if there is improvement in symptoms. Other options to consider are Zyrtec (cetirizine) 10mg daily and Allegra (fexofenadine) 180mg daily "     Follow-up: 2024 at 8:30 AM for updated A1c, cholesterol, and vitamin D labs    SUBJECTIVE/OBJECTIVE:                          Oh Damico is a 58 year old female coming in for an initial visit. She was referred to me from Dr. Harkins. PCP is [facility].     Reason for visit: Diabetes management.    Allergies/ADRs: Reviewed in chart  Past Medical History: Reviewed in chart  Tobacco: She reports that she has never smoked. She has never used smokeless tobacco.  Alcohol: 1-2 beverages / month    Medication Adherence/Access: Patient uses pill box(es).  Patient takes medications 2 time(s) per day.   Per patient, misses medication 1-2 times per month if that.   Medication barriers: Patient notes that Trulicity will require a prior authorization starting this year and is looking for support with helping with this coverage. Notes that medications have been affordable at this time.     Diabetes   Trulicity 3mg weekly - patient notes that they have been on this dose for over a month.   Metformin 1000mg twice daily   Tresiba 44 units daily   Patient does note that they have ongoing nausea usually occurring in the mornings but that current symptoms are tolerable, is not sure what medication is causing these side effects.   Aspirin 81mg daily  Blood sugar monitoring: Continuous Glucose Monitor right now. Notes that after this sensor is , insurance will not cover sensor until deductible is met thus she will go back to traditional monitoring.   Per [employer] ave over the past 14 days:   181-240: 8%  Time in target range (): 90%  Low: 0%  Sensor usage: 80%  Current diabetes symptoms: none, notes that they usually eat a snack before bed to prevent hypoglycemia and this has been working well to prevent low blood sugars and not increasing blood sugars above goal in the morning.   Diet/Exercise: Patient notes that she is working towards weight loss, has a harder time getting to the gym due to lack of funding and it is  harder to workout in the winter due to darker colder days.      Urine Albumin:   Lab Results   Component Value Date    UMALCR  12/12/2023      Comment:      Unable to calculate, urine albumin and/or urine creatinine is outside detectable limits.  Microalbuminuria is defined as an albumin:creatinine ratio of 17 to 299 for males and 25 to 299 for females. A ratio of albumin:creatinine of 300 or higher is indicative of overt proteinuria.  Due to biologic variability, positive results should be confirmed by a second, first-morning random or 24-hour timed urine specimen. If there is discrepancy, a third specimen is recommended. When 2 out of 3 results are in the microalbuminuria range, this is evidence for incipient nephropathy and warrants increased efforts at glucose control, blood pressure control, and institution of therapy with an angiotensin-converting-enzyme (ACE) inhibitor (if the patient can tolerate it).        Lab Results   Component Value Date    A1C 6.2 (H) 12/12/2023     Elevated blood pressure/edema:   Hydrochlorothiazide 25mg daily - patient notes that they were originally prescribed for edema and it has been effective to help with edema symptoms.   Patient reports no current medication side effects     BP Readings from Last 3 Encounters:   12/12/23 127/82   07/18/23 134/84   06/06/23 120/70     Pulse Readings from Last 3 Encounters:   12/12/23 83   07/18/23 94   06/06/23 84     Hyperlipidemia   pravastatin 80mg daily  Fenofibrate 145mg daily   Patient reports no significant myalgias or other side effects.  The 10-year ASCVD risk score (Vel COOMBS, et al., 2019) is: 4.4%    Values used to calculate the score:      Age: 58 years      Sex: Female      Is Non- : No      Diabetic: Yes      Tobacco smoker: No      Systolic Blood Pressure: 127 mmHg      Is BP treated: No      HDL Cholesterol: 45 mg/dL      Total Cholesterol: 176 mg/dL     Recent Labs   Lab Test 12/12/23  4497  "06/06/23  0935   CHOL 176 156   HDL 45* 46*   LDL 95 86   TRIG 178* 118     Depression:   Bupropion XL 150mg daily   Patient notes that this has been effective for mood.   PHQ-9 score:        12/12/2023     8:03 AM   PHQ   PHQ-9 Total Score 1   Q9: Thoughts of better off dead/self-harm past 2 weeks Not at all     Allergies:   Loratadine 10mg daily  Has not tolerated Flonase in the past.   Patient notes that they are allergic to cat but not giving up, notes that loratadine is somewhat effective.     Supplements:   Vitamin D 5000 international unit(s)   Multivitamin 1 tablet daily   Vitamin D Deficiency Screening Results:  No results found for: \"VITDT\"    Today's Vitals: BP (!) 150/77   Pulse 79   ----------------  I spent 45 minutes with this patient today. All changes were made via collaborative practice agreement with ROB Dominguez CNP. A copy of the visit note was provided to the patient's provider(s).    A summary of these recommendations was given to the patient.    Evonne Olea, PharmD  Medication Therapy Management Pharmacist   [facility]      Medication Therapy Recommendations  Hyperlipidemia, unspecified hyperlipidemia type    Current Medication: pravastatin (PRAVACHOL) 80 MG tablet (Discontinued)   Rationale: More effective medication available - Ineffective medication - Effectiveness   Recommendation: Change Medication - ROSUVASTATIN CALCIUM   Status: Accepted per CPA         Seasonal allergic rhinitis due to other allergic trigger    Current Medication: loratadine (CLARITIN) 10 mg tablet   Rationale: More effective medication available - Ineffective medication - Effectiveness   Recommendation: Change Medication - ZYRTEC ALLERGY PO   Status: Accepted per CPA            "

## 2024-01-04 NOTE — TELEPHONE ENCOUNTER
KEVON PA REQUEST    Please route outcome to MTM/RP: Harshad Olea    Prior Authorization Retail Medication Request    Medication/Dose: Dulaglutide (TRULICITY) 3 MG/0.5ML SOPN- Trulicity 3mg weekly   ICD code (if different than what is on RX):  Type 2 diabetes mellitus with hyperglycemia, without long-term current use of insulin (H) [E11.65]  Previously Tried and Failed:  N/A  Rationale:  Currently on maximum dose metformin, Tresiba, and Trulicity. A1c has been at goal for the past 6 months on current regimen thus would be appropriate to continue.     Insurance Name:  [facility]  Insurance ID:  [id number]      Pharmacy Information (if different than what is on RX)  Name:  [id number] [place] [address]  Phone:  [phone]

## 2024-01-04 NOTE — PATIENT INSTRUCTIONS
"Recommendations from today's MTM visit:                                                      I will start prior authorization for Trulicity     Stop pravastatin and start rosuvastatin 20mg daily     Consider adjusting allergy medication seasonally or yearly to see if there is improvement in symptoms. Other options to consider are Zyrtec (cetirizine) 10mg daily and Allegra (fexofenadine) 180mg daily     Follow-up: 4/4/2024 at 8:30 AM for updated A1c, cholesterol, and vitamin D labs    It was great speaking with you today.  I value your experience and would be very thankful for your time in providing feedback in our clinic survey. In the next few days, you may receive an email or text message from [facility] [employer] with a link to a survey related to your  clinical pharmacist.\"     To schedule another MTM appointment, please call the clinic directly or you may call the MTM scheduling line at [phone].    My Clinical Pharmacist's contact information:                                                      Please feel free to contact me with any questions or concerns you have.      Evonne Olea, PharmD  Medication Therapy Management Pharmacist   [facility]    "

## 2024-01-08 NOTE — TELEPHONE ENCOUNTER
Central Prior Authorization Team   Phone: [phone]    PA Initiation    Medication: TRULICITY 3 MG/0.5ML SC SOPN  Insurance Company: [employer] - Phone [phone] Fax [phone]  Pharmacy [facility]illing the [id number] [place] [facility] [address]   Filling Pharmacy Phone: [phone]  Filling Pharmacy Fax:    Start Date: 1/8/2024

## 2024-01-09 NOTE — TELEPHONE ENCOUNTER
Prior Authorization Approval    Medication: TRULICITY 3 MG/0.5ML SC SOPN  Authorization Effective Date: 1/9/2024  Authorization Expiration Date: 1/9/2025  Approved Dose/Quantity:   Reference #:     Insurance Company: [employer] - Phone [phone] Fax [phone]  Expected CoPay: $    CoPay Card Available:      Financial Assistance Needed:   Which Pharmacy is filling the prescription: [facility] [address] [address]  Pharmacy Notified: Yes  Patient Notified: **Instructed pharmacy to notify patient when script is ready to /ship.**

## 2024-01-14 DIAGNOSIS — F32.0 CURRENT MILD EPISODE OF MAJOR DEPRESSIVE DISORDER WITHOUT PRIOR EPISODE (H): ICD-10-CM

## 2024-01-15 RX ORDER — BUPROPION HYDROCHLORIDE 150 MG/1
150 TABLET ORAL EVERY MORNING
Qty: 90 TABLET | Refills: 0 | Status: SHIPPED | OUTPATIENT
Start: 2024-01-15 | End: 2024-04-04

## 2024-01-19 DIAGNOSIS — E11.65 TYPE 2 DIABETES MELLITUS WITH HYPERGLYCEMIA, WITHOUT LONG-TERM CURRENT USE OF INSULIN (H): ICD-10-CM

## 2024-01-31 ENCOUNTER — OFFICE VISIT (OUTPATIENT)
Dept: FAMILY MEDICINE | Facility: CLINIC | Age: 59
End: 2024-01-31
Payer: COMMERCIAL

## 2024-01-31 VITALS
DIASTOLIC BLOOD PRESSURE: 87 MMHG | OXYGEN SATURATION: 97 % | TEMPERATURE: 98.3 F | RESPIRATION RATE: 16 BRPM | SYSTOLIC BLOOD PRESSURE: 152 MMHG | HEART RATE: 86 BPM

## 2024-01-31 DIAGNOSIS — J06.9 VIRAL URI WITH COUGH: Primary | ICD-10-CM

## 2024-01-31 DIAGNOSIS — R05.1 ACUTE COUGH: ICD-10-CM

## 2024-01-31 LAB
FLUAV AG SPEC QL IA: NEGATIVE
FLUBV AG SPEC QL IA: NEGATIVE
SARS-COV-2 RNA RESP QL NAA+PROBE: NEGATIVE

## 2024-01-31 PROCEDURE — 99213 OFFICE O/P EST LOW 20 MIN: CPT | Performed by: NURSE PRACTITIONER

## 2024-01-31 PROCEDURE — 87635 SARS-COV-2 COVID-19 AMP PRB: CPT | Performed by: NURSE PRACTITIONER

## 2024-01-31 PROCEDURE — 87804 INFLUENZA ASSAY W/OPTIC: CPT | Performed by: NURSE PRACTITIONER

## 2024-01-31 ASSESSMENT — ENCOUNTER SYMPTOMS
FEVER: 0
SORE THROAT: 1
COUGH: 1
SHORTNESS OF BREATH: 0
CHEST TIGHTNESS: 0

## 2024-01-31 NOTE — PATIENT INSTRUCTIONS
You are experiencing common viral symptoms. Viruses take 2-3 weeks to resolve on average. Antibiotics don't work on viruses.      Try over-the-counter cough and cold medication as needed such as Dayquil/Nyquil    Recheck if fevers, shortness of breath, persistent ear pain or not starting to feel better in about 10 days      COVID test.  Your results will come to MyChart tomorrow or tonight.     Tylenol/ibuprofen    For sore throat-Also try Cepacol lozenges, throat coat tea or tea with honey.

## 2024-01-31 NOTE — PROGRESS NOTES
Assessment & Plan     Acute cough    - Symptomatic COVID-19 Virus (Coronavirus) by PCR Nose  - Influenza A & B Antigen - Clinic Collect    Viral URI with cough         Focused exam and history done due to COVID-19 pandemic in a walk-in setting.      History, exam, and vital signs consistent with a viral URI.  + Recent cruise.  Mild symptoms today.    Test as above.  Patient okay being treated if positive for the flu over MyChart.  Patient to call for appointment if COVID test is positive.    No red flags.     Recheck if shortness of breath or new fevers develop.  Rest.     OTCs recommended: None [   ].  Dextromethorphan  [ x ], guaifenesin [  x], pseudoephedrine [   ], Afrin for no greater than 3 days [  ], Tylenol or ibuprofen [ x ].                Return in about 10 days (around 2/10/2024) for If no better.    Gaby Francisco, [facility]    Saran Casiano is a 58 year old female who presents to clinic today for the following health issues:  Chief Complaint   Patient presents with    Cough     Started with cough 3 days ago worse since yesterday just returned from cruise and  in hospital  with para influenza     HPI    Cough for the last 2 days.  Recent cruise.  No fevers.     has a history of asthma and probably COPD and was admitted with viral swab testing positive for parainfluenza virus.     Patient is a type II diabetic, but has no chronic lung disease.        Review of Systems   Constitutional:  Negative for fever.   HENT:  Positive for sore throat (Mild). Negative for congestion.    Respiratory:  Positive for cough. Negative for chest tightness and shortness of breath.    Cardiovascular:  Negative for chest pain.           Objective    BP (!) 152/87   Pulse 86   Temp 98.3  F (36.8  C) (Oral)   Resp 16   SpO2 97%   Physical Exam  Constitutional:       General: She is not in acute distress.     Appearance: She is well-developed.   Eyes:       General:         Right eye: No discharge.         Left eye: No discharge.      Conjunctiva/sclera: Conjunctivae normal.   Pulmonary:      Effort: Pulmonary effort is normal.      Breath sounds: Normal breath sounds. No wheezing.   Musculoskeletal:         General: Normal range of motion.   Skin:     General: Skin is warm and dry.      Capillary Refill: Capillary refill takes less than 2 seconds.   Neurological:      Mental Status: She is alert and oriented to person, place, and time.   Psychiatric:         Mood and Affect: Mood normal.         Behavior: Behavior normal.         Thought Content: Thought content normal.         Judgment: Judgment normal.            Results for orders placed or performed in visit on 01/31/24 (from the past 24 hour(s))   Influenza A & B Antigen - Clinic Collect    Specimen: Nose; Swab   Result Value Ref Range    Influenza A antigen Negative Negative    Influenza B antigen Negative Negative    Narrative    Test results must be correlated with clinical data. If necessary, results should be confirmed by a molecular assay or viral culture.

## 2024-02-01 DIAGNOSIS — R60.9 EDEMA, UNSPECIFIED TYPE: ICD-10-CM

## 2024-02-01 RX ORDER — HYDROCHLOROTHIAZIDE 25 MG/1
25 TABLET ORAL DAILY
Qty: 90 TABLET | Refills: 1 | Status: SHIPPED | OUTPATIENT
Start: 2024-02-01 | End: 2024-07-10

## 2024-02-05 DIAGNOSIS — E11.65 TYPE 2 DIABETES MELLITUS WITH HYPERGLYCEMIA, WITHOUT LONG-TERM CURRENT USE OF INSULIN (H): ICD-10-CM

## 2024-02-05 RX ORDER — BLOOD SUGAR DIAGNOSTIC
STRIP MISCELLANEOUS
Qty: 100 STRIP | Refills: 1 | Status: SHIPPED | OUTPATIENT
Start: 2024-02-05

## 2024-02-12 ENCOUNTER — PATIENT OUTREACH (OUTPATIENT)
Dept: CARE COORDINATION | Facility: CLINIC | Age: 59
End: 2024-02-12
Payer: COMMERCIAL

## 2024-02-18 DIAGNOSIS — E11.65 TYPE 2 DIABETES MELLITUS WITH HYPERGLYCEMIA, WITHOUT LONG-TERM CURRENT USE OF INSULIN (H): ICD-10-CM

## 2024-02-19 RX ORDER — INSULIN DEGLUDEC 100 U/ML
44 INJECTION, SOLUTION SUBCUTANEOUS DAILY
Qty: 45 ML | Refills: 3 | Status: SHIPPED | OUTPATIENT
Start: 2024-02-19

## 2024-03-18 DIAGNOSIS — E78.2 MIXED HYPERLIPIDEMIA: ICD-10-CM

## 2024-03-18 RX ORDER — FENOFIBRATE 145 MG/1
TABLET, COATED ORAL
Qty: 90 TABLET | Refills: 3 | OUTPATIENT
Start: 2024-03-18

## 2024-04-03 NOTE — PROGRESS NOTES
Medication Therapy Management (MTM) Encounter    ASSESSMENT:                            Medication Adherence/Access: No issues identified    Diabetes: Patient's A1c has increased but remains at goal of <7%, recommend a dose increase of Trulicity today to help with additional blood sugar reduction and weight loss benefits. Plan to re-check A1c again in 3 months after dose increase, if A1c has increased or if patient would like to have more benefits for weight loss, plan at that time to consider changing to a more effective GLP1 such as Ozempic or Mounjaro. Educated to patient how to decrease insulin dose if experiencing lower blood sugar readings with Trulicity dose increase.     Elevated blood pressure/edema: Stable, blood pressure has improved since last MTM visit and is near goal of <130/80, will continue to monitor during visits. Could consider ACEi/ARB as additional therapy if needed in the future. Appropriate to continue on current therapy at this time as UACR was normal when last checked.     Hyperlipidemia: Plan to obtain updated lipid labs to assess rosuvastatin efficacy.     Allergies: Recommend patient consider trial of cetirizine as this is sometimes more effective than loratadine and Allegra.     PLAN:                            Obtain updated A1c, lipids, and vitamin D labs today     Increase Trulicity to 4.5mg weekly     If you notice your fasting blood sugars decrease less than 70 with dose increase of Trulicity decrease Tresiba by 3 units every 2-3 days    Consider trying Zyrtec (cetirizine) 10mg daily for allergies     Future considerations: change Trulicity to Ozempic or Mounjaro     Follow-up: Return in about 3 months (around 7/4/2024). Visit with Sole for A1c re-check, if can't have appointment with Sole, schedule with me.    SUBJECTIVE/OBJECTIVE:                          Oh Damico is a 58 year old female coming in for a follow-up visit.       Reason for visit: Diabetes follow  up.    Allergies/ADRs: Reviewed in chart  Past Medical History: Reviewed in chart  Tobacco: She reports that she has never smoked. She has never used smokeless tobacco.  Alcohol: Did not have time to assess today.     Medication Adherence/Access: no issues reported    Diabetes   Trulicity 3mg weekly - notes some GI side effects but overall tolerable.   Metformin 1000mg twice daily   Tresiba 44 units daily   Aspirin 81mg daily  Blood sugar monitoring:          Eye exam is up to date  Foot exam: due  Urine Albumin:   Lab Results   Component Value Date    UMALCR  12/12/2023      Comment:      Unable to calculate, urine albumin and/or urine creatinine is outside detectable limits.  Microalbuminuria is defined as an albumin:creatinine ratio of 17 to 299 for males and 25 to 299 for females. A ratio of albumin:creatinine of 300 or higher is indicative of overt proteinuria.  Due to biologic variability, positive results should be confirmed by a second, first-morning random or 24-hour timed urine specimen. If there is discrepancy, a third specimen is recommended. When 2 out of 3 results are in the microalbuminuria range, this is evidence for incipient nephropathy and warrants increased efforts at glucose control, blood pressure control, and institution of therapy with an angiotensin-converting-enzyme (ACE) inhibitor (if the patient can tolerate it).        Lab Results   Component Value Date    A1C 6.7 (H) 04/04/2024     Elevated blood pressure/edema:   Hydrochlorothiazide 25mg daily - patient notes that they were originally prescribed for edema and it has been effective to help with edema symptoms.   Patient reports no current medication side effects     BP Readings from Last 3 Encounters:   01/31/24 (!) 152/87   01/04/24 (!) 150/77   12/12/23 127/82     Pulse Readings from Last 3 Encounters:   01/31/24 86   01/04/24 79   12/12/23 83     Hyperlipidemia   Rosuvastatin 20mg daily (replaced pravastatin at last MTM visit)    Fenofibrate 145mg daily   Patient reports no significant myalgias or other side effects.     Recent Labs   Lab Test 12/12/23  0858 06/06/23  0935   CHOL 176 156   HDL 45* 46*   LDL 95 86   TRIG 178* 118     Allergies:   Patient has tried both Loratadine and Allergra and both were not effective.   Has not tolerated Flonase in the past.   Patient notes that they are allergic to their cat.     Today's Vitals: /84   Pulse 77   ----------------  I spent 30 minutes with this patient today. All changes were made via collaborative practice agreement with ROB Dominguez CNP. A copy of the visit note was provided to the patient's provider(s).    A summary of these recommendations was given to the patient.    Evonne Olea, PharmD  Medication Therapy Management Pharmacist   [facility]      Medication Therapy Recommendations  Seasonal allergic rhinitis due to other allergic trigger    Current Medication: loratadine (CLARITIN) 10 mg tablet   Rationale: More effective medication available - Ineffective medication - Effectiveness   Recommendation: Change Medication - cetirizine 10 MG tablet   Status: Accepted - no CPA Needed         Type 2 diabetes mellitus with hyperglycemia, without long-term current use of insulin (H)    Current Medication: Dulaglutide (TRULICITY) 3 MG/0.5ML SOPN (Discontinued)   Rationale: Dose too low - Dosage too low - Effectiveness   Recommendation: Increase Dose   Status: Accepted per CPA

## 2024-04-04 ENCOUNTER — OFFICE VISIT (OUTPATIENT)
Dept: PHARMACY | Facility: CLINIC | Age: 59
End: 2024-04-04
Payer: COMMERCIAL

## 2024-04-04 ENCOUNTER — LAB (OUTPATIENT)
Dept: LAB | Facility: CLINIC | Age: 59
End: 2024-04-04
Payer: COMMERCIAL

## 2024-04-04 VITALS — HEART RATE: 77 BPM | SYSTOLIC BLOOD PRESSURE: 136 MMHG | DIASTOLIC BLOOD PRESSURE: 84 MMHG

## 2024-04-04 DIAGNOSIS — R03.0 ELEVATED BLOOD PRESSURE READING WITHOUT DIAGNOSIS OF HYPERTENSION: ICD-10-CM

## 2024-04-04 DIAGNOSIS — R60.9 EDEMA, UNSPECIFIED TYPE: ICD-10-CM

## 2024-04-04 DIAGNOSIS — F32.0 CURRENT MILD EPISODE OF MAJOR DEPRESSIVE DISORDER WITHOUT PRIOR EPISODE (H): ICD-10-CM

## 2024-04-04 DIAGNOSIS — J30.89 SEASONAL ALLERGIC RHINITIS DUE TO OTHER ALLERGIC TRIGGER: ICD-10-CM

## 2024-04-04 DIAGNOSIS — E11.65 TYPE 2 DIABETES MELLITUS WITH HYPERGLYCEMIA, WITHOUT LONG-TERM CURRENT USE OF INSULIN (H): ICD-10-CM

## 2024-04-04 DIAGNOSIS — E78.2 MIXED HYPERLIPIDEMIA: ICD-10-CM

## 2024-04-04 DIAGNOSIS — E55.9 VITAMIN D DEFICIENCY: ICD-10-CM

## 2024-04-04 DIAGNOSIS — E11.65 TYPE 2 DIABETES MELLITUS WITH HYPERGLYCEMIA, WITHOUT LONG-TERM CURRENT USE OF INSULIN (H): Primary | ICD-10-CM

## 2024-04-04 LAB — HBA1C MFR BLD: 6.7 % (ref 0–5.6)

## 2024-04-04 PROCEDURE — 83036 HEMOGLOBIN GLYCOSYLATED A1C: CPT

## 2024-04-04 PROCEDURE — 36415 COLL VENOUS BLD VENIPUNCTURE: CPT

## 2024-04-04 PROCEDURE — 99207 PR NO CHARGE LOS: CPT

## 2024-04-04 PROCEDURE — 80061 LIPID PANEL: CPT

## 2024-04-04 PROCEDURE — 82306 VITAMIN D 25 HYDROXY: CPT

## 2024-04-04 RX ORDER — FENOFIBRATE 145 MG/1
145 TABLET, COATED ORAL DAILY
Qty: 90 TABLET | Refills: 3 | Status: SHIPPED | OUTPATIENT
Start: 2024-04-04 | End: 2024-07-10

## 2024-04-04 RX ORDER — DULAGLUTIDE 4.5 MG/.5ML
4.5 INJECTION, SOLUTION SUBCUTANEOUS WEEKLY
Qty: 6 ML | Refills: 0 | Status: SHIPPED | OUTPATIENT
Start: 2024-04-04 | End: 2024-06-05 | Stop reason: ALTCHOICE

## 2024-04-04 RX ORDER — BUPROPION HYDROCHLORIDE 150 MG/1
150 TABLET ORAL EVERY MORNING
Qty: 90 TABLET | Refills: 1 | Status: SHIPPED | OUTPATIENT
Start: 2024-04-04 | End: 2024-07-10

## 2024-04-04 NOTE — PATIENT INSTRUCTIONS
"Recommendations from today's MTM visit:                                                      Obtain updated A1c, lipids, and vitamin D labs today     Increase Trulicity to 4.5mg weekly     If you notice your fasting blood sugars decrease less than 70 with dose increase of Trulicity decrease Tresiba by 3 units every 2-3 days    Consider trying Zyrtec (cetirizine) 10mg daily for allergies     Future considerations: change Trulicity to Ozempic or Mounjaro     Follow-up: Return in about 3 months (around 7/4/2024). Visit with Sole for A1c re-check, if can't have appointment with Sole, schedule with me.    It was great speaking with you today.  I value your experience and would be very thankful for your time in providing feedback in our clinic survey. In the next few days, you may receive an email or text message from [facility] [employer] with a link to a survey related to your  clinical pharmacist.\"     To schedule another MTM appointment, please call the clinic directly or you may call the MTM scheduling line at [phone].    My Clinical Pharmacist's contact information:                                                      Please feel free to contact me with any questions or concerns you have.      Evonne Olea, PharmD  Medication Therapy Management Pharmacist   [facility]    "

## 2024-04-05 LAB
CHOLEST SERPL-MCNC: 127 MG/DL
FASTING STATUS PATIENT QL REPORTED: YES
HDLC SERPL-MCNC: 46 MG/DL
LDLC SERPL CALC-MCNC: 59 MG/DL
NONHDLC SERPL-MCNC: 81 MG/DL
TRIGL SERPL-MCNC: 112 MG/DL
VIT D+METAB SERPL-MCNC: 77 NG/ML (ref 20–50)

## 2024-05-17 NOTE — PROGRESS NOTES
Assessment and Plan:     1. Type 2 diabetes mellitus with hyperglycemia, without long-term current use of insulin (H)  We will check A1c and notify patient of results.  If A1c is greater than 8%, will refer to [facility] pharmacist.  - generic lancets; Use 1 each As Directed as needed. Dispense brand per patient's insurance at pharmacy discretion.  Dispense: 100 each; Refill: 3  - Glycosylated Hemoglobin A1c  - dulaglutide (TRULICITY) 0.75 mg/0.5 mL PnIj; Inject 0.75 mg under the skin every 7 days.  Dispense: 12 Syringe; Refill: 1  - metFORMIN (GLUCOPHAGE) 500 MG tablet; Take 1 tablet (500 mg total) by mouth 2 (two) times a day with meals.  Dispense: 180 tablet; Refill: 1    2. Edema, unspecified type  She continues hydrochlorothiazide.  - hydroCHLOROthiazide (HYDRODIURIL) 25 MG tablet; Take 1 tablet (25 mg total) by mouth daily.  Dispense: 90 tablet; Refill: 2    3. Mixed hyperlipidemia  She continues pravastatin and fenofibrate.  This is stable.  - pravastatin (PRAVACHOL) 80 MG tablet; Take 1 tablet (80 mg total) by mouth every evening.  Dispense: 90 tablet; Refill: 2  - fenofibrate (TRICOR) 145 MG tablet; Take 1 tablet (145 mg total) by mouth daily.  Dispense: 90 tablet; Refill: 2    4. Chronic allergic rhinitis  Continue fluticasone nasal sprays and loratadine.  - fluticasone (FLONASE) 50 mcg/actuation nasal spray; 2 sprays into each nostril daily.  Dispense: 3 g; Refill: 3    5. Morbid obesity (H)  The following high BMI interventions were performed this visit: encouragement to exercise and lifestyle education regarding diet    6. Rash  Patient may have mild tinea pedis.  Otherwise, cracking skin can be from aspiration.  Discussed symptomatic treatment.  Recommend using over-the-counter Lotrimin or Lamisil cream twice daily.  Discussed concerning symptoms including worsening erythema, drainage, pain, fever.  If this occurs, suggest follow-up.    7. Screen for colon cancer  - An    8. Visit for screening  Labs are all normal, except for :  High blood sugar : diet and exercise is recommended.   High cholesterol: I recommend starting on statins like Atorvastatin or rosuvastatin.     mammogram  - Mammo Screening Bilateral; Future    The patient is content with the plan and will follow-up in 3-6 months for medication management or sooner with any further concerns.       Subjective:     Josefina is a 53 y.o. female presenting to the clinic for a diabetes check.  Patient takes metformin 500 mg twice daily and has been using Trulicity 0.75 mg once weekly.  Patient states she ran out of Trulicity last week and she found out that she has to use mail order pharmacy.  Patient is not consuming a healthy diet.  She walks for exercise.  While she was using the Trulicity, her fasting blood sugar was less than 100.  Her last eye exam was 1 year ago.  Last hemoglobin A1c was 8% on 11/26/18.  She is concerned of a sore present on her left foot for 3 weeks.  She feels as though the skin continually cracks open.  The sore is present between her fourth and fifth toes.  She denies any drainage.  No fever has been present.  Patient has lower extremity edema which is controlled with hydrochlorothiazide 25 mg daily.  She is taking pravastatin 80 mg daily and fenofibrate 145 mg daily for hyperlipidemia.  Last cholesterol check was on 11/26/18 with a total cholesterol 167, triglycerides 155, HDL 40, LDL 88.  She is using fluticasone nasal sprays and loratadine for allergies.    Review of Systems: A complete 14 point review of systems was obtained and is negative or as stated in the history of present illness.    Social History     Socioeconomic History     Marital status:      Spouse name: Not on file     Number of children: Not on file     Years of education: Not on file     Highest education level: Not on file   Occupational History     Not on file   Social Needs     Financial resource strain: Not on file     Food insecurity:     Worry: Not on file     Inability: Not on file     Transportation needs:     Medical: Not on file     Non-medical: Not on file   Tobacco Use     Smoking status: Never Smoker      "Smokeless tobacco: Never Used   Substance and Sexual Activity     Alcohol use: Yes     Alcohol/week: 0.6 oz     Types: 1 Glasses of wine per week     Drug use: No     Sexual activity: Yes     Partners: Male     Comment:    Lifestyle     Physical activity:     Days per week: Not on file     Minutes per session: Not on file     Stress: Not on file   Relationships     Social connections:     Talks on phone: Not on file     Gets together: Not on file     Attends [employer] service: Not on file     Active member of club or organization: Not on file     Attends meetings of clubs or organizations: Not on file     Relationship status: Not on file     Intimate partner violence:     Fear of current or ex partner: Not on file     Emotionally abused: Not on file     Physically abused: Not on file     Forced sexual activity: Not on file   Other Topics Concern     Not on file   Social History Narrative     Not on file       Active Ambulatory Problems     Diagnosis Date Noted     Type 2 diabetes mellitus (H) 10/24/2017     Hyperlipidemia, unspecified hyperlipidemia type 10/24/2017     Edema, unspecified type 10/24/2017     Class 1 obesity due to excess calories with serious comorbidity and body mass index (BMI) of 33.0 to 33.9 in adult 10/24/2017     Morbid obesity (H) 08/23/2018     Resolved Ambulatory Problems     Diagnosis Date Noted     No Resolved Ambulatory Problems     Past Medical History:   Diagnosis Date     Diabetes mellitus (H)      Environmental allergies      Hyperlipidemia        Family History   Problem Relation Age of Onset     Lung cancer Mother      Diabetes Mother      Heart disease Father      COPD Father      Dementia Father      Parkinsonism Father      Hyperlipidemia Father      Diabetes Maternal Aunt      Diabetes Maternal Grandmother      Diabetes Paternal Grandfather        Objective:     /74   Pulse 78   Ht 5' 1.5\" (1.562 m)   Wt 193 lb 8 oz (87.8 kg)   BMI 35.97 kg/m      Patient is " alert, in no obvious distress.   Skin: Warm, dry.    Lungs:  Clear to auscultation. Respirations even and unlabored.  No wheezing or rales noted.   Heart:  Regular rate and rhythm.  No murmurs, S3, S4, gallops, or rubs.    Abdomen: Soft, nontender.  No organomegaly. Bowel sounds normoactive. No guarding or masses noted.   Musculoskeletal: She has erythematous cracked skin present between her left foot fourth and fifth toes.  There is no streaking.  No drainage is present.  It is nontender to palpation.

## 2024-06-05 ENCOUNTER — TELEPHONE (OUTPATIENT)
Dept: FAMILY MEDICINE | Facility: CLINIC | Age: 59
End: 2024-06-05

## 2024-06-05 NOTE — TELEPHONE ENCOUNTER
KEVON PA REQUEST    Please route outcome to MTM/[facility]: Harshad Olea    Prior Authorization Retail Medication Request    Medication/Dose: Semaglutide, 1 MG/DOSE, (OZEMPIC) 4 MG/3ML pen Inject 1 mg Subcutaneous every 7 day   ICD code (if different than what is on RX):  Type 2 diabetes mellitus with hyperglycemia, without long-term current use of insulin (H) [E11.65]  - Primary   Rationale:  Unable to get Trulicity from pharmacy due to national medication shortage      Pharmacy Information (if different than what is on RX)  Name[id number] [place] [facility] [address]  Phone:  [phone]

## 2024-06-13 NOTE — TELEPHONE ENCOUNTER
PA Initiation    Medication: OZEMPIC (1 MG/DOSE) 4 MG/3ML SC SOPN  Insurance Company: [employer] - Phone [phone] Fax [phone]  Pharmacy [facility]illing [id number] [place] [address]  Filling Pharmacy Phone: [phone]  Filling Pharmacy Fax:    Start Date: 6/13/2024

## 2024-06-14 NOTE — TELEPHONE ENCOUNTER
PRIOR AUTHORIZATION DENIED    Medication: OZEMPIC (1 MG/DOSE) 4 MG/3ML SC SOPN  Insurance Company: [employer] - Phone [phone] Fax [phone]  Denial Date: 6/14/2024  Denial Reason(s): PATIENT HAS TO TRY FORMULARY ALTERNATIVES:     Appeal Information:   Patient Notified: NO

## 2024-06-14 NOTE — TELEPHONE ENCOUNTER
Appeal letter created in letter section, please start appeal.     Coretta, can you please reach out to the patient and update them of the status that we are now going into the appeal process.     Evonne Olea, PharmD  Medication Therapy Management Pharmacist   [facility]

## 2024-06-24 NOTE — TELEPHONE ENCOUNTER
Medication Appeal Initiation    Medication: OZEMPIC (1 MG/DOSE) 4 MG/3ML SC SOPN  Appeal Start Date:  6/24/2024  Insurance Company: [facility] [facility]  Insurance Phone:   Insurance Fax: [phone]  Comments:    Faxed in LMN to [facility] today [facility]

## 2024-06-24 NOTE — TELEPHONE ENCOUNTER
Hi team,     Just checking in on a status update of this appeal, thanks!     Evonne Olea, PharmD  Endocrine & Diabetes Medication Therapy Management Pharmacist   [address] [place] [phone]

## 2024-06-25 NOTE — TELEPHONE ENCOUNTER
Appeal request sent yesterday went to wrong fax number. Confirmed correct fax number today and resent. FAX: [phone].

## 2024-06-26 ENCOUNTER — TELEPHONE (OUTPATIENT)
Dept: PHARMACY | Facility: CLINIC | Age: 59
End: 2024-06-26

## 2024-06-26 NOTE — TELEPHONE ENCOUNTER
"Received Inbound call today from Dina at [facility] - My appeal request has been received - CASE ID: [id number]. Per review the request does not meet insurance guidelines to be considered an \"URGENT\" appeal. This will be processed as a standard appeal - allow 30 days for determination.     If patient has questions the member services number is: [phone]  IF provider has questions the number is: [phone]      "

## 2024-06-28 RX ORDER — IPRATROPIUM BROMIDE 42 UG/1
SPRAY, METERED NASAL
COMMUNITY
Start: 2024-04-11 | End: 2024-07-09

## 2024-07-05 SDOH — HEALTH STABILITY: PHYSICAL HEALTH: ON AVERAGE, HOW MANY MINUTES DO YOU ENGAGE IN EXERCISE AT THIS LEVEL?: 20 MIN

## 2024-07-05 SDOH — HEALTH STABILITY: PHYSICAL HEALTH: ON AVERAGE, HOW MANY DAYS PER WEEK DO YOU ENGAGE IN MODERATE TO STRENUOUS EXERCISE (LIKE A BRISK WALK)?: 1 DAY

## 2024-07-05 ASSESSMENT — ANXIETY QUESTIONNAIRES
GAD7 TOTAL SCORE: 3
7. FEELING AFRAID AS IF SOMETHING AWFUL MIGHT HAPPEN: NOT AT ALL
1. FEELING NERVOUS, ANXIOUS, OR ON EDGE: SEVERAL DAYS
4. TROUBLE RELAXING: NOT AT ALL
3. WORRYING TOO MUCH ABOUT DIFFERENT THINGS: SEVERAL DAYS
IF YOU CHECKED OFF ANY PROBLEMS ON THIS QUESTIONNAIRE, HOW DIFFICULT HAVE THESE PROBLEMS MADE IT FOR YOU TO DO YOUR WORK, TAKE CARE OF THINGS AT HOME, OR GET ALONG WITH OTHER PEOPLE: NOT DIFFICULT AT ALL
6. BECOMING EASILY ANNOYED OR IRRITABLE: SEVERAL DAYS
7. FEELING AFRAID AS IF SOMETHING AWFUL MIGHT HAPPEN: NOT AT ALL
GAD7 TOTAL SCORE: 3
8. IF YOU CHECKED OFF ANY PROBLEMS, HOW DIFFICULT HAVE THESE MADE IT FOR YOU TO DO YOUR WORK, TAKE CARE OF THINGS AT HOME, OR GET ALONG WITH OTHER PEOPLE?: NOT DIFFICULT AT ALL
2. NOT BEING ABLE TO STOP OR CONTROL WORRYING: NOT AT ALL
5. BEING SO RESTLESS THAT IT IS HARD TO SIT STILL: NOT AT ALL

## 2024-07-05 ASSESSMENT — SOCIAL DETERMINANTS OF HEALTH (SDOH): HOW OFTEN DO YOU GET TOGETHER WITH FRIENDS OR RELATIVES?: ONCE A WEEK

## 2024-07-09 ASSESSMENT — PATIENT HEALTH QUESTIONNAIRE - PHQ9
10. IF YOU CHECKED OFF ANY PROBLEMS, HOW DIFFICULT HAVE THESE PROBLEMS MADE IT FOR YOU TO DO YOUR WORK, TAKE CARE OF THINGS AT HOME, OR GET ALONG WITH OTHER PEOPLE: SOMEWHAT DIFFICULT
SUM OF ALL RESPONSES TO PHQ QUESTIONS 1-9: 4
SUM OF ALL RESPONSES TO PHQ QUESTIONS 1-9: 4

## 2024-07-09 NOTE — TELEPHONE ENCOUNTER
MEDICATION APPEAL APPROVED    Medication: OZEMPIC (1 MG/DOSE) 4 MG/3ML SC SOPN  Authorization Effective Date: 6/13/2024  Authorization Expiration Date: 7/8/2025  Appeal Insurance Company: [facility]  [id number] [place] [facility] [address] [place] [address]  Patient Notified: NO  Comments:   Called [facility] today [phone] (options 4,1,1,2) Spoke with Raghu (call reference #: - he informed me that a determination on this appeal was made today. Medical necessity criteria has been met.     According to rep they don't have a copy of the approval letter ([employer] has it)     Called pharmacy and they are getting a paid claim and will notify patient.

## 2024-07-10 ENCOUNTER — OFFICE VISIT (OUTPATIENT)
Dept: FAMILY MEDICINE | Facility: CLINIC | Age: 59
End: 2024-07-10
Payer: COMMERCIAL

## 2024-07-10 VITALS
OXYGEN SATURATION: 98 % | HEIGHT: 61 IN | TEMPERATURE: 97.8 F | WEIGHT: 190 LBS | RESPIRATION RATE: 16 BRPM | HEART RATE: 77 BPM | BODY MASS INDEX: 35.87 KG/M2 | SYSTOLIC BLOOD PRESSURE: 117 MMHG | DIASTOLIC BLOOD PRESSURE: 77 MMHG

## 2024-07-10 DIAGNOSIS — R60.9 EDEMA, UNSPECIFIED TYPE: ICD-10-CM

## 2024-07-10 DIAGNOSIS — Z79.899 MEDICATION MANAGEMENT: ICD-10-CM

## 2024-07-10 DIAGNOSIS — E66.01 MORBID OBESITY (H): ICD-10-CM

## 2024-07-10 DIAGNOSIS — E78.2 MIXED HYPERLIPIDEMIA: ICD-10-CM

## 2024-07-10 DIAGNOSIS — Z00.00 ENCOUNTER FOR ROUTINE HISTORY AND PHYSICAL EXAM IN FEMALE: Primary | ICD-10-CM

## 2024-07-10 DIAGNOSIS — T45.2X1A VITAMIN D OVERDOSE, ACCIDENTAL OR UNINTENTIONAL, INITIAL ENCOUNTER: ICD-10-CM

## 2024-07-10 DIAGNOSIS — F32.0 CURRENT MILD EPISODE OF MAJOR DEPRESSIVE DISORDER WITHOUT PRIOR EPISODE (H): ICD-10-CM

## 2024-07-10 DIAGNOSIS — E11.65 TYPE 2 DIABETES MELLITUS WITH HYPERGLYCEMIA, WITHOUT LONG-TERM CURRENT USE OF INSULIN (H): ICD-10-CM

## 2024-07-10 DIAGNOSIS — Z12.31 VISIT FOR SCREENING MAMMOGRAM: ICD-10-CM

## 2024-07-10 PROBLEM — F32.1 CURRENT MODERATE EPISODE OF MAJOR DEPRESSIVE DISORDER WITHOUT PRIOR EPISODE (H): Status: ACTIVE | Noted: 2022-07-07

## 2024-07-10 PROBLEM — F32.1 CURRENT MODERATE EPISODE OF MAJOR DEPRESSIVE DISORDER WITHOUT PRIOR EPISODE (H): Status: RESOLVED | Noted: 2022-07-07 | Resolved: 2024-07-10

## 2024-07-10 LAB
ALBUMIN SERPL BCG-MCNC: 4.6 G/DL (ref 3.5–5.2)
ALBUMIN UR-MCNC: NEGATIVE MG/DL
ALP SERPL-CCNC: 59 U/L (ref 40–150)
ALT SERPL W P-5'-P-CCNC: 20 U/L (ref 0–50)
ANION GAP SERPL CALCULATED.3IONS-SCNC: 9 MMOL/L (ref 7–15)
APPEARANCE UR: CLEAR
AST SERPL W P-5'-P-CCNC: 21 U/L (ref 0–45)
BACTERIA #/AREA URNS HPF: ABNORMAL /HPF
BILIRUB SERPL-MCNC: 0.3 MG/DL
BILIRUB UR QL STRIP: NEGATIVE
BUN SERPL-MCNC: 17.2 MG/DL (ref 6–20)
CALCIUM SERPL-MCNC: 10 MG/DL (ref 8.6–10)
CHLORIDE SERPL-SCNC: 104 MMOL/L (ref 98–107)
COLOR UR AUTO: YELLOW
CREAT SERPL-MCNC: 0.95 MG/DL (ref 0.51–0.95)
DEPRECATED HCO3 PLAS-SCNC: 27 MMOL/L (ref 22–29)
EGFRCR SERPLBLD CKD-EPI 2021: 70 ML/MIN/1.73M2
ERYTHROCYTE [DISTWIDTH] IN BLOOD BY AUTOMATED COUNT: 13.6 % (ref 10–15)
GLUCOSE SERPL-MCNC: 94 MG/DL (ref 70–99)
GLUCOSE UR STRIP-MCNC: NEGATIVE MG/DL
HBA1C MFR BLD: 6.7 % (ref 0–5.6)
HCT VFR BLD AUTO: 40.1 % (ref 35–47)
HGB BLD-MCNC: 13.3 G/DL (ref 11.7–15.7)
HGB UR QL STRIP: NEGATIVE
KETONES UR STRIP-MCNC: NEGATIVE MG/DL
LEUKOCYTE ESTERASE UR QL STRIP: ABNORMAL
MCH RBC QN AUTO: 29 PG (ref 26.5–33)
MCHC RBC AUTO-ENTMCNC: 33.2 G/DL (ref 31.5–36.5)
MCV RBC AUTO: 88 FL (ref 78–100)
NITRATE UR QL: NEGATIVE
PH UR STRIP: 7 [PH] (ref 5–8)
PLATELET # BLD AUTO: 391 10E3/UL (ref 150–450)
POTASSIUM SERPL-SCNC: 4.3 MMOL/L (ref 3.4–5.3)
PROT SERPL-MCNC: 7.8 G/DL (ref 6.4–8.3)
RBC # BLD AUTO: 4.58 10E6/UL (ref 3.8–5.2)
RBC #/AREA URNS AUTO: ABNORMAL /HPF
SODIUM SERPL-SCNC: 140 MMOL/L (ref 135–145)
SP GR UR STRIP: 1.01 (ref 1–1.03)
SQUAMOUS #/AREA URNS AUTO: ABNORMAL /LPF
TSH SERPL DL<=0.005 MIU/L-ACNC: 2.61 UIU/ML (ref 0.3–4.2)
UROBILINOGEN UR STRIP-ACNC: 0.2 E.U./DL
VIT D+METAB SERPL-MCNC: 57 NG/ML (ref 20–50)
WBC # BLD AUTO: 6.3 10E3/UL (ref 4–11)
WBC #/AREA URNS AUTO: ABNORMAL /HPF

## 2024-07-10 PROCEDURE — 83036 HEMOGLOBIN GLYCOSYLATED A1C: CPT | Performed by: NURSE PRACTITIONER

## 2024-07-10 PROCEDURE — 80053 COMPREHEN METABOLIC PANEL: CPT | Performed by: NURSE PRACTITIONER

## 2024-07-10 PROCEDURE — 82306 VITAMIN D 25 HYDROXY: CPT | Performed by: NURSE PRACTITIONER

## 2024-07-10 PROCEDURE — 87086 URINE CULTURE/COLONY COUNT: CPT | Performed by: NURSE PRACTITIONER

## 2024-07-10 PROCEDURE — 99396 PREV VISIT EST AGE 40-64: CPT | Mod: 25 | Performed by: NURSE PRACTITIONER

## 2024-07-10 PROCEDURE — 90677 PCV20 VACCINE IM: CPT | Performed by: NURSE PRACTITIONER

## 2024-07-10 PROCEDURE — 85027 COMPLETE CBC AUTOMATED: CPT | Performed by: NURSE PRACTITIONER

## 2024-07-10 PROCEDURE — 81001 URINALYSIS AUTO W/SCOPE: CPT | Performed by: NURSE PRACTITIONER

## 2024-07-10 PROCEDURE — 90471 IMMUNIZATION ADMIN: CPT | Performed by: NURSE PRACTITIONER

## 2024-07-10 PROCEDURE — 36415 COLL VENOUS BLD VENIPUNCTURE: CPT | Performed by: NURSE PRACTITIONER

## 2024-07-10 PROCEDURE — 99213 OFFICE O/P EST LOW 20 MIN: CPT | Mod: 25 | Performed by: NURSE PRACTITIONER

## 2024-07-10 PROCEDURE — 84443 ASSAY THYROID STIM HORMONE: CPT | Performed by: NURSE PRACTITIONER

## 2024-07-10 RX ORDER — ROSUVASTATIN CALCIUM 20 MG/1
20 TABLET, COATED ORAL DAILY
Qty: 90 TABLET | Refills: 3 | Status: SHIPPED | OUTPATIENT
Start: 2024-07-10

## 2024-07-10 RX ORDER — HYDROCHLOROTHIAZIDE 25 MG/1
25 TABLET ORAL DAILY
Qty: 90 TABLET | Refills: 3 | Status: SHIPPED | OUTPATIENT
Start: 2024-07-10

## 2024-07-10 RX ORDER — BUPROPION HYDROCHLORIDE 150 MG/1
150 TABLET ORAL EVERY MORNING
Qty: 90 TABLET | Refills: 3 | Status: SHIPPED | OUTPATIENT
Start: 2024-07-10

## 2024-07-10 RX ORDER — FENOFIBRATE 145 MG/1
145 TABLET, COATED ORAL DAILY
Qty: 90 TABLET | Refills: 3 | Status: SHIPPED | OUTPATIENT
Start: 2024-07-10

## 2024-07-10 ASSESSMENT — PAIN SCALES - GENERAL: PAINLEVEL: NO PAIN (0)

## 2024-07-10 NOTE — PROGRESS NOTES
Assessment and Plan:    Encounter for routine history and physical exam in female  Recommend consuming a healthy diet and exercising.  Pneumococcal vaccine provided.  She is due for mammogram.  She is up-to-date on colorectal cancer and cervical cancer screening.  - CBC with platelets  - TSH with free T4 reflex  - UA Macroscopic with reflex to Microscopic and Culture  - UA Macroscopic with reflex to Microscopic and Culture  - CBC with platelets  - TSH with free T4 reflex  - Urine Microscopic Exam  - Urine Culture    Visit for screening mammogram  - MA Screening Bilateral w/ Brian    Type 2 diabetes mellitus with hyperglycemia, without long-term current use of insulin (H)  This has been controlled.  She continues metformin and Tresiba.  She will start Ozempic in 2 weeks.  She is to follow-up in 3 months for A1c recheck.  - Hemoglobin A1c  - Hemoglobin A1c  - metFORMIN (GLUCOPHAGE) 500 MG tablet  Dispense: 360 tablet; Refill: 1    Vitamin D overdose, accidental or unintentional, initial encounter  She is taking 2000 units daily.  Will notify patient of results.  - Vitamin D Deficiency  - Vitamin D Deficiency    Current mild episode of major depressive disorder without prior episode (H24)  PHQ-9 score is 4.  She continues bupropion.  - buPROPion (WELLBUTRIN XL) 150 MG 24 hr tablet  Dispense: 90 tablet; Refill: 3    Mixed hyperlipidemia  This is controlled.  She continues fenofibrate and rosuvastatin.  - fenofibrate (TRICOR) 145 MG tablet  Dispense: 90 tablet; Refill: 3  - rosuvastatin (CRESTOR) 20 MG tablet  Dispense: 90 tablet; Refill: 3    Edema, unspecified type  This is controlled.  She continues hydrochlorothiazide.  - hydrochlorothiazide (HYDRODIURIL) 25 MG tablet  Dispense: 90 tablet; Refill: 3    Morbid obesity (H)  Recommend consuming a healthy diet and exercising.  This is contributing to hyperlipidemia and diabetes.    Medication management  - Comprehensive metabolic panel  - Comprehensive metabolic  panel      Subjective:     Josefina is a 58 year old female presenting to the clinic for a female physical.     LMP: ablation 13 years ago   Hx of abnormal pap smear: none   Last pap smear: 2/4/22  normal, negative HPV   Perform self-breast exams: yes   Vaginal discharge or irritation:   Sexually active: yes,  for 18 years   Contraception: none   Concerns for STDs: none   Previous pregnancies:none    Patient has multiple comorbidities including type 2 diabetes, hyperlipidemia, chronic depression, obesity.  She is taking metformin 1000 mg twice daily, injecting Tresiba 44 units daily, and injecting Trulicity 4.5 mg once weekly.  She just found out that her insurance is willing to cover Ozempic so she will start 1 mg once weekly when she completes 2 more injections of Trulicity.  Last A1c was 6.7% on 4/4/2024.  She is trying to consume healthy diet.  She walks for exercise.  She is taking fenofibrate 145 mg daily and rosuvastatin 20 mg daily for lipid management.  Last cholesterol check was on 4/4/2024 with a total cholesterol 127, triglycerides 112, HDL 46, LDL 59.  Blood pressure and edema are well controlled with hydrochlorothiazide 25 mg daily.  She is taking bupropion  mg daily.  Depression which is well controlled.  Patient was noted to have elevated vitamin D with a level of 77 on 4/4/2024.  She decreased her daily dose to 2000 units daily.    Review of systems:  I performed a 10 point review of systems.  All pertinent positives and negatives are noted in the HPI. All others are negative.     No Known Allergies    Current Outpatient Medications   Medication Sig Dispense Refill    ACCU-CHEK GUIDE test strip USE 1 EACH AS DIRECTED DAILY 100 strip 1    aspirin 81 MG EC tablet [ASPIRIN 81 MG EC TABLET] Take 81 mg by mouth daily.      blood glucose monitoring (ACCU-CHEK FASTCLIX) lancets USE 1 EACH AS DIRECTED DAILY. DISPENSE BRAND PER PATIENT'S INSURANCE AT PHARMACY DISCRETION. 102 each 3    Blood  Glucose Monitoring Suppl (GLUCOCOM BLOOD GLUCOSE MONITOR) KEISHA 1 Device      buPROPion (WELLBUTRIN XL) 150 MG 24 hr tablet Take 1 tablet (150 mg) by mouth every morning 90 tablet 1    cholecalciferol, vitamin D3, (VITAMIN D3) 5,000 unit Tab [CHOLECALCIFEROL, VITAMIN D3, (VITAMIN D3) 5,000 UNIT TAB] Take 5,000 Units by mouth daily.      Continuous Blood Gluc Sensor (FREESTYLE EDUIN 14 DAY SENSOR) MISC Change every 14 days. 2 each 11    Dulaglutide (TRULICITY) 4.5 MG/0.5ML SOPN Inject 4.5 mg Subcutaneous once a week 2 mL 3    fenofibrate (TRICOR) 145 MG tablet Take 1 tablet (145 mg) by mouth daily 90 tablet 3    generic lancets [GENERIC LANCETS] Use 1 each As Directed daily. Dispense brand per patient's insurance at pharmacy discretion. 100 each 3    hydrochlorothiazide (HYDRODIURIL) 25 MG tablet TAKE 1 TABLET BY MOUTH EVERY DAY 90 tablet 1    insulin degludec (TRESIBA FLEXTOUCH) 100 UNIT/ML pen INJECT 44 UNITS SUBCUTANEOUS DAILY 45 mL 3    insulin pen needle (BD PEN NEEDLE IVORY 2ND GEN) 32G X 4 MM miscellaneous USE 1 NEEDLE TO INJECT UNDER THE SKIN DAILY. 100 each 2    loratadine (CLARITIN) 10 mg tablet [LORATADINE (CLARITIN) 10 MG TABLET] Take 10 mg by mouth daily.      metFORMIN (GLUCOPHAGE) 500 MG tablet TAKE 2 TABLETS (1,000MG) BY MOUTH TWICE A DAY WITH MEALS 360 tablet 1    multivitamin therapeutic tablet [MULTIVITAMIN THERAPEUTIC TABLET] Take 1 tablet by mouth daily.      rosuvastatin (CRESTOR) 20 MG tablet Take 1 tablet (20 mg) by mouth daily 90 tablet 3    Semaglutide, 1 MG/DOSE, (OZEMPIC) 4 MG/3ML pen Inject 1 mg Subcutaneous every 7 days 3 mL 1     No current facility-administered medications for this visit.       Social History     Socioeconomic History    Marital status:      Spouse name: Not on file    Number of children: Not on file    Years of education: Not on file    Highest education level: Not on file   Occupational History    Not on file   Tobacco Use    Smoking status: Never    Smokeless  tobacco: Never   Vaping Use    Vaping status: Never Used   Substance and Sexual Activity    Alcohol use: Yes     Alcohol/week: 1.0 standard drink of alcohol    Drug use: No    Sexual activity: Yes     Partners: Male     Comment:    Other Topics Concern    Not on file   Social History Narrative    Not on file     Social Determinants of Health     Financial Resource Strain: Low Risk  (7/5/2024)    Financial Resource Strain     Within the past 12 months, have you or your family members you live with been unable to get utilities (heat, electricity) when it was really needed?: No   Food Insecurity: Low Risk  (7/5/2024)    Food Insecurity     Within the past 12 months, did you worry that your food would run out before you got money to buy more?: No     Within the past 12 months, did the food you bought just not last and you didn t have money to get more?: No   Transportation Needs: Low Risk  (7/5/2024)    Transportation Needs     Within the past 12 months, has lack of transportation kept you from medical appointments, getting your medicines, non-medical meetings or appointments, work, or from getting things that you need?: No   Physical Activity: Insufficiently Active (7/5/2024)    Exercise Vital Sign     Days of Exercise per Week: 1 day     Minutes of Exercise per Session: 20 min   Stress: No Stress Concern Present (7/5/2024)    [employer] [employer] - Occupational Stress Questionnaire     Feeling of Stress : Only a little   Social Connections: Unknown (7/5/2024)    Social Connection and Isolation Panel [NHANES]     Frequency of Communication with Friends and Family: Not on file     Frequency of Social Gatherings with Friends and Family: Once a week     Attends [employer] Services: Not on file     Active Member of Clubs or Organizations: Not on file     Attends Club or Organization Meetings: Not on file     Marital Status: Not on file   Interpersonal Safety: Low Risk  (7/10/2024)    Interpersonal  "Safety     Do you feel physically and emotionally safe where you currently live?: Yes     Within the past 12 months, have you been hit, slapped, kicked or otherwise physically hurt by someone?: No     Within the past 12 months, have you been humiliated or emotionally abused in other ways by your partner or ex-partner?: No   Housing Stability: Low Risk  (7/5/2024)    Housing Stability     Do you have housing? : Yes     Are you worried about losing your housing?: No       Past Medical History:   Diagnosis Date    Diabetes mellitus (H)     Environmental allergies     Hyperlipidemia        Family History   Problem Relation Age of Onset    Lung Cancer Mother     Diabetes Mother     Heart Disease Father     Chronic Obstructive Pulmonary Disease Father     Dementia Father     Parkinsonism Father     Hyperlipidemia Father     Diabetes Maternal Aunt     Diabetes Maternal Grandmother     Diabetes Paternal Grandfather        Past Surgical History:   Procedure Laterality Date    ENDOMETRIAL ABLATION      LAPAROSCOPIC HERNIORRHAPHY INCISIONAL N/A 1/16/2020    Procedure: HERNIORRHAPHY, UMBILICAL, LAPAROSCOPIC with mesh;  Surgeon: Kel Pittman MD;  Location: [facility];  Service: General       Objective:     /77   Pulse 77   Temp 97.8  F (36.6  C)   Resp 16   Ht 1.549 m (5' 1\")   Wt 86.2 kg (190 lb)   SpO2 98%   Breastfeeding No   BMI 35.90 kg/m      Patient is alert, no obvious distress.   Skin: Warm, dry.  No rashes or lesions. Skin turgor rapid return.   HEENT:  Eyes normal.  Ears normal.  Nose patent, mucosa pink.  Oropharynx mucosa pink, no lesions or tonsil enlargement.   Neck:  Supple, without lymphadenopathy, bruits, JVD. Thyroid normal texture and size.    Lungs:  Clear to auscultation.  No wheezing, rales noted.  Respirations even and unlabored.   Heart:  Regular rate and rhythm.  No murmurs.   Breasts:  Normal.  No surrounding adenopathy.   Abdomen: Soft, nontender.  No organomegaly.  Bowel " sounds normoactive.  No guarding or masses noted.   : deferred  Musculoskeletal:  Full ROM of extremities.  Muscle strength equal +5/5.   Neurological:  Cranial nerves 2-12 intact.              Answers submitted by the patient for this visit:  Patient Health Questionnaire (Submitted on 7/9/2024)  If you checked off any problems, how difficult have these problems made it for you to do your work, take care of things at home, or get along with other people?: Somewhat difficult  PHQ9 TOTAL SCORE: 4  ANMOL-7 (Submitted on 7/5/2024)  ANMOL 7 TOTAL SCORE: 3

## 2024-07-11 LAB — BACTERIA UR CULT: NORMAL

## 2024-07-14 ENCOUNTER — HEALTH MAINTENANCE LETTER (OUTPATIENT)
Age: 59
End: 2024-07-14

## 2024-07-25 ENCOUNTER — HOSPITAL ENCOUNTER (OUTPATIENT)
Dept: MAMMOGRAPHY | Facility: CLINIC | Age: 59
Discharge: HOME | End: 2024-07-25
Attending: NURSE PRACTITIONER | Admitting: NURSE PRACTITIONER
Payer: COMMERCIAL

## 2024-07-25 DIAGNOSIS — Z12.31 VISIT FOR SCREENING MAMMOGRAM: ICD-10-CM

## 2024-07-25 PROCEDURE — 77063 BREAST TOMOSYNTHESIS BI: CPT

## 2024-07-29 ENCOUNTER — PATIENT OUTREACH (OUTPATIENT)
Dept: CARE COORDINATION | Facility: CLINIC | Age: 59
End: 2024-07-29
Payer: COMMERCIAL

## 2024-08-16 ENCOUNTER — TELEPHONE (OUTPATIENT)
Dept: FAMILY MEDICINE | Facility: CLINIC | Age: 59
End: 2024-08-16
Payer: COMMERCIAL

## 2024-08-16 NOTE — TELEPHONE ENCOUNTER
See PCP's recommendations for the patient regarding COVID-19 symptoms on 8/16/24.    Writer relayed the above information to the patient, who verbalized understanding, but declined Paxlovid treatment at this time.    Patient tested positive for COVID-19 on Friday, 8/16/24.    Patient's current COVID-19 symptoms started yesterday, Thursday, 8/15/24.    Tuesday, 8/21/24, is the last day that the patient would qualify for Paxlovid treatment.    Denies trouble breathing, chest pain, dizziness, lightheadedness, dysuria, vomiting, diarrhea, or nausea at this time.    Writer offered to triage the patient for COVID-19 symptoms, but patient declined nurse triage at this time.    Patient stated she will wait through the weekend to see how she feels and will call back into the clinic on Monday, 8/19/24, if she decides that she would like to start Paxlovid treatment for current COVID-19 infection.    Denies other questions or concerns at this time.    Vianey Richard, RN, BSN  [facility]

## 2024-08-16 NOTE — TELEPHONE ENCOUNTER
Patient calling to report a positive Covid test today. Symptoms started yesterday with fever (the highest was 101), headache, chills, cough, and fatigue. She has been taking Tylenol. She is wondering if it is indicated for her to come into clinic. She does not want to come in unless she has to. Discussed that she does not need to be seen unless she is having concerning symptoms such as inability to stay hydrated or shortness of breath. Discussed symptom management at home. Offered to run the Paxlovid protocol and explained what Paxlovid is and informed her that it is optional. She was feeling unsure about this and wanted to know her PCP's thoughts on if it is indicated or not. She is hesitant about taking a pill if she doesn't need to. Reiterated that it is optional. She would still like to know PCP's thoughts.

## 2024-08-16 NOTE — TELEPHONE ENCOUNTER
Due to her diabetes, depression, and weight, studies show she is at a higher risk of complications from covid.  Paxlovid will decrease the risk of complications and hospitalization.  It is up to her if she wants to take it.  Thanks.

## 2024-08-30 DIAGNOSIS — E11.65 TYPE 2 DIABETES MELLITUS WITH HYPERGLYCEMIA, WITHOUT LONG-TERM CURRENT USE OF INSULIN (H): ICD-10-CM

## 2024-08-30 RX ORDER — SEMAGLUTIDE 1.34 MG/ML
1 INJECTION, SOLUTION SUBCUTANEOUS
Qty: 3 ML | Refills: 1 | Status: SHIPPED | OUTPATIENT
Start: 2024-08-30

## 2024-08-30 RX ORDER — PEN NEEDLE, DIABETIC 32GX 5/32"
NEEDLE, DISPOSABLE MISCELLANEOUS
Qty: 100 EACH | Refills: 1 | Status: SHIPPED | OUTPATIENT
Start: 2024-08-30

## 2024-10-09 ENCOUNTER — TRANSFERRED RECORDS (OUTPATIENT)
Dept: HEALTH INFORMATION MANAGEMENT | Facility: CLINIC | Age: 59
End: 2024-10-09

## 2024-10-09 LAB — RETINOPATHY: NEGATIVE

## 2024-10-15 ENCOUNTER — OFFICE VISIT (OUTPATIENT)
Dept: FAMILY MEDICINE | Facility: CLINIC | Age: 59
End: 2024-10-15
Attending: NURSE PRACTITIONER
Payer: COMMERCIAL

## 2024-10-15 VITALS
RESPIRATION RATE: 20 BRPM | TEMPERATURE: 97.6 F | HEIGHT: 61 IN | WEIGHT: 188 LBS | HEART RATE: 83 BPM | BODY MASS INDEX: 35.5 KG/M2 | DIASTOLIC BLOOD PRESSURE: 68 MMHG | SYSTOLIC BLOOD PRESSURE: 110 MMHG | OXYGEN SATURATION: 98 %

## 2024-10-15 DIAGNOSIS — E78.5 HYPERLIPIDEMIA, UNSPECIFIED HYPERLIPIDEMIA TYPE: ICD-10-CM

## 2024-10-15 DIAGNOSIS — E11.65 TYPE 2 DIABETES MELLITUS WITH HYPERGLYCEMIA, WITHOUT LONG-TERM CURRENT USE OF INSULIN (H): Primary | ICD-10-CM

## 2024-10-15 DIAGNOSIS — I10 BENIGN ESSENTIAL HYPERTENSION: ICD-10-CM

## 2024-10-15 DIAGNOSIS — R53.83 FATIGUE, UNSPECIFIED TYPE: ICD-10-CM

## 2024-10-15 LAB
ALBUMIN SERPL BCG-MCNC: 4.4 G/DL (ref 3.5–5.2)
ALP SERPL-CCNC: 59 U/L (ref 40–150)
ALT SERPL W P-5'-P-CCNC: 16 U/L (ref 0–50)
ANION GAP SERPL CALCULATED.3IONS-SCNC: 12 MMOL/L (ref 7–15)
AST SERPL W P-5'-P-CCNC: 17 U/L (ref 0–45)
BILIRUB SERPL-MCNC: 0.2 MG/DL
BUN SERPL-MCNC: 21.9 MG/DL (ref 6–20)
CALCIUM SERPL-MCNC: 10.2 MG/DL (ref 8.8–10.4)
CHLORIDE SERPL-SCNC: 105 MMOL/L (ref 98–107)
CREAT SERPL-MCNC: 0.94 MG/DL (ref 0.51–0.95)
EGFRCR SERPLBLD CKD-EPI 2021: 70 ML/MIN/1.73M2
ERYTHROCYTE [DISTWIDTH] IN BLOOD BY AUTOMATED COUNT: 13.7 % (ref 10–15)
ERYTHROCYTE [SEDIMENTATION RATE] IN BLOOD BY WESTERGREN METHOD: 24 MM/HR (ref 0–30)
EST. AVERAGE GLUCOSE BLD GHB EST-MCNC: 134 MG/DL
GLUCOSE SERPL-MCNC: 108 MG/DL (ref 70–99)
HBA1C MFR BLD: 6.3 % (ref 0–5.6)
HCO3 SERPL-SCNC: 24 MMOL/L (ref 22–29)
HCT VFR BLD AUTO: 40.1 % (ref 35–47)
HGB BLD-MCNC: 13.3 G/DL (ref 11.7–15.7)
MCH RBC QN AUTO: 29.4 PG (ref 26.5–33)
MCHC RBC AUTO-ENTMCNC: 33.2 G/DL (ref 31.5–36.5)
MCV RBC AUTO: 89 FL (ref 78–100)
PLATELET # BLD AUTO: 373 10E3/UL (ref 150–450)
POTASSIUM SERPL-SCNC: 4.6 MMOL/L (ref 3.4–5.3)
PROT SERPL-MCNC: 7.7 G/DL (ref 6.4–8.3)
RBC # BLD AUTO: 4.53 10E6/UL (ref 3.8–5.2)
SODIUM SERPL-SCNC: 141 MMOL/L (ref 135–145)
TSH SERPL DL<=0.005 MIU/L-ACNC: 3.11 UIU/ML (ref 0.3–4.2)
VIT B12 SERPL-MCNC: 514 PG/ML (ref 232–1245)
VIT D+METAB SERPL-MCNC: 45 NG/ML (ref 20–50)
WBC # BLD AUTO: 6.8 10E3/UL (ref 4–11)

## 2024-10-15 PROCEDURE — 36415 COLL VENOUS BLD VENIPUNCTURE: CPT | Performed by: NURSE PRACTITIONER

## 2024-10-15 PROCEDURE — 90673 RIV3 VACCINE NO PRESERV IM: CPT | Performed by: NURSE PRACTITIONER

## 2024-10-15 PROCEDURE — 85027 COMPLETE CBC AUTOMATED: CPT | Performed by: NURSE PRACTITIONER

## 2024-10-15 PROCEDURE — 84443 ASSAY THYROID STIM HORMONE: CPT | Performed by: NURSE PRACTITIONER

## 2024-10-15 PROCEDURE — 85652 RBC SED RATE AUTOMATED: CPT | Performed by: NURSE PRACTITIONER

## 2024-10-15 PROCEDURE — 82607 VITAMIN B-12: CPT | Performed by: NURSE PRACTITIONER

## 2024-10-15 PROCEDURE — 90471 IMMUNIZATION ADMIN: CPT | Performed by: NURSE PRACTITIONER

## 2024-10-15 PROCEDURE — 80053 COMPREHEN METABOLIC PANEL: CPT | Performed by: NURSE PRACTITIONER

## 2024-10-15 PROCEDURE — 99214 OFFICE O/P EST MOD 30 MIN: CPT | Mod: 25 | Performed by: NURSE PRACTITIONER

## 2024-10-15 PROCEDURE — 82306 VITAMIN D 25 HYDROXY: CPT | Performed by: NURSE PRACTITIONER

## 2024-10-15 PROCEDURE — 83036 HEMOGLOBIN GLYCOSYLATED A1C: CPT | Performed by: NURSE PRACTITIONER

## 2024-10-15 RX ORDER — DULAGLUTIDE 4.5 MG/.5ML
INJECTION, SOLUTION SUBCUTANEOUS
COMMUNITY
Start: 2024-07-03 | End: 2024-10-15

## 2024-10-15 ASSESSMENT — PAIN SCALES - GENERAL: PAINLEVEL: NO PAIN (0)

## 2024-10-15 NOTE — PROGRESS NOTES
Assessment and Plan:     Type 2 diabetes mellitus with hyperglycemia, without long-term current use of insulin (H)  This has been controlled.  She has had some lower with low sugars.  If A1c has decreased, may consider decreasing insulin dose.  She continues metformin, Tresiba, Ozempic as prescribed.  - Hemoglobin A1c    Fatigue, unspecified type  Will rule out anemia, vitamin deficiencies, thyroid disease, inflammation.  Suspect this is related to COVID.  If symptoms persist, may consider referral to long COVID clinic.  - CBC with platelets  - Comprehensive metabolic panel  - Vitamin D Deficiency  - Vitamin B12  - TSH with free T4 reflex  - Erythrocyte sedimentation rate auto    Hyperlipidemia, unspecified hyperlipidemia type  She continues fenofibrate and rosuvastatin.    Benign essential hypertension  This is controlled with hydrochlorothiazide.        Subjective:     Josefina is a 59 year old female presenting to the clinic for medication management.  Patient has multiple comorbidities including type 2 diabetes, hyperlipidemia, chronic depression, obesity.  She is taking metformin 1000 mg twice daily, injecting Tresiba 44 units daily, and injecting Ozempic 1 mg once weekly.   Last A1c was 6.7% on 7/10/24.  Patient states she has had some lower blood sugars around 60.  Her average blood sugar is 118 randomly.  She does wake up in the melanite feeling hungry.  She denies any sores on her feet.  Her last eye exam was last week.  She is trying to consume healthy diet.  She walks for exercise.  She is taking fenofibrate 145 mg daily and rosuvastatin 20 mg daily for lipid management.  Last cholesterol check was on 4/4/2024 with a total cholesterol 127, triglycerides 112, HDL 46, LDL 59.  Blood pressure and edema are well controlled with hydrochlorothiazide 25 mg daily.  She is taking bupropion  mg daily.  Patient is concerned of ongoing fatigue since having COVID in August.  Patient feels as though she cannot stay  awake.  She is sleeping well at night.  If she sits for too long, she takes a nap.  All other close GERD symptoms have resolved.  She denies any recent tick bites.  She denies joint pain, body aches, rash, fevers.    Reviewof Systems: A complete 14 point review of systems was obtained and is negative or as stated in the history of present illness.    Social History     Socioeconomic History    Marital status:      Spouse name: Not on file    Number of children: Not on file    Years of education: Not on file    Highest education level: Not on file   Occupational History    Not on file   Tobacco Use    Smoking status: Never     Passive exposure: Past    Smokeless tobacco: Never   Vaping Use    Vaping status: Never Used   Substance and Sexual Activity    Alcohol use: Yes     Alcohol/week: 1.0 standard drink of alcohol    Drug use: No    Sexual activity: Yes     Partners: Male     Comment:    Other Topics Concern    Not on file   Social History Narrative    Not on file     Social Determinants of Health     Financial Resource Strain: Low Risk  (7/5/2024)    Financial Resource Strain     Within the past 12 months, have you or your family members you live with been unable to get utilities (heat, electricity) when it was really needed?: No   Food Insecurity: Low Risk  (7/5/2024)    Food Insecurity     Within the past 12 months, did you worry that your food would run out before you got money to buy more?: No     Within the past 12 months, did the food you bought just not last and you didn t have money to get more?: No   Transportation Needs: Low Risk  (7/5/2024)    Transportation Needs     Within the past 12 months, has lack of transportation kept you from medical appointments, getting your medicines, non-medical meetings or appointments, work, or from getting things that you need?: No   Physical Activity: Insufficiently Active (7/5/2024)    Exercise Vital Sign     Days of Exercise per Week: 1 day     Minutes  of Exercise per Session: 20 min   Stress: No Stress Concern Present (7/5/2024)    [employer] [place] [employer] - Occupational Stress Questionnaire     Feeling of Stress : Only a little   Social Connections: Unknown (7/5/2024)    Social Connection and Isolation Panel [NHANES]     Frequency of Communication with Friends and Family: Not on file     Frequency of Social Gatherings with Friends and Family: Once a week     Attends Congregation Services: Not on file     Active Member of Clubs or Organizations: Not on file     Attends Club or Organization Meetings: Not on file     Marital Status: Not on file   Interpersonal Safety: Low Risk  (7/10/2024)    Interpersonal Safety     Do you feel physically and emotionally safe where you currently live?: Yes     Within the past 12 months, have you been hit, slapped, kicked or otherwise physically hurt by someone?: No     Within the past 12 months, have you been humiliated or emotionally abused in other ways by your partner or ex-partner?: No   Housing Stability: Low Risk  (7/5/2024)    Housing Stability     Do you have housing? : Yes     Are you worried about losing your housing?: No       Active Ambulatory Problems     Diagnosis Date Noted    Type 2 diabetes mellitus with hyperglycemia, without long-term current use of insulin (H) 10/24/2017    Hyperlipidemia, unspecified hyperlipidemia type 10/24/2017    Edema, unspecified type 10/24/2017    Morbid obesity (H) 08/23/2018    Incarcerated umbilical hernia 12/17/2019    Major depression, recurrent (H) 06/06/2023     Resolved Ambulatory Problems     Diagnosis Date Noted    Current moderate episode of major depressive disorder without prior episode (H) 07/07/2022     Past Medical History:   Diagnosis Date    Diabetes mellitus (H)     Environmental allergies     Hyperlipidemia        Family History   Problem Relation Age of Onset    Lung Cancer Mother     Diabetes Mother     Heart Disease Father     Chronic Obstructive  "Pulmonary Disease Father     Dementia Father     Parkinsonism Father     Hyperlipidemia Father     Diabetes Maternal Aunt     Diabetes Maternal Grandmother     Diabetes Paternal Grandfather        Objective:     /68   Pulse 83   Temp 97.6  F (36.4  C)   Resp 20   Ht 1.549 m (5' 1\")   Wt 85.3 kg (188 lb)   SpO2 98%   Breastfeeding No   BMI 35.52 kg/m      Patient is alert, in no obvious distress.   Skin: Warm, dry.    Neck: Supple, no lymphadenopathy. No thyromegaly.  Lungs:  Clear to auscultation. Respirations even and unlabored.  No wheezing or rales noted.   Heart:  Regular rate and rhythm.  No murmurs, S3, S4, gallops, or rubs.    Musculoskeletal:  Full ROM of extremities.  No edema is present in bilateral lower extremities.         Answers submitted by the patient for this visit:  Diabetes Visit (Submitted on 10/11/2024)  Chief Complaint: Chronic problems general questions HPI Form  Frequency of checking blood sugars:: four or more times daily  What time of day are you checking your blood sugars : before and after meals, at bedtime  Have you had any blood sugars above 200?: Yes  Have you had any blood sugars below 70?: Yes  Hypoglycemia symptoms:: none  Diabetic concerns:: low blood sugar, several less than 70 in the past few weeks  Paraesthesia present:: none of these symptoms  Have you had a diabetic eye exam within the last year?: Yes  Date of last eye exam: : 10/9/2024  Where was this eye exam done?: [facility]    "

## 2024-10-18 DIAGNOSIS — E11.65 TYPE 2 DIABETES MELLITUS WITH HYPERGLYCEMIA, WITHOUT LONG-TERM CURRENT USE OF INSULIN (H): Primary | ICD-10-CM

## 2024-10-18 RX ORDER — INSULIN DEGLUDEC 100 U/ML
20 INJECTION, SOLUTION SUBCUTANEOUS DAILY
Status: SHIPPED
Start: 2024-10-18

## 2024-11-14 ENCOUNTER — MYC MEDICAL ADVICE (OUTPATIENT)
Dept: FAMILY MEDICINE | Facility: CLINIC | Age: 59
End: 2024-11-14
Payer: COMMERCIAL

## 2024-11-14 DIAGNOSIS — E11.65 TYPE 2 DIABETES MELLITUS WITH HYPERGLYCEMIA, WITHOUT LONG-TERM CURRENT USE OF INSULIN (H): ICD-10-CM

## 2024-11-14 RX ORDER — FLASH GLUCOSE SENSOR
KIT MISCELLANEOUS
Qty: 2 EACH | Refills: 11 | Status: SHIPPED | OUTPATIENT
Start: 2024-11-14

## 2024-12-31 DIAGNOSIS — E11.65 TYPE 2 DIABETES MELLITUS WITH HYPERGLYCEMIA, WITHOUT LONG-TERM CURRENT USE OF INSULIN (H): ICD-10-CM

## 2025-01-24 PROBLEM — E66.01 MORBID OBESITY (H): Status: RESOLVED | Noted: 2018-08-23 | Resolved: 2025-01-24

## 2025-03-02 DIAGNOSIS — E11.65 TYPE 2 DIABETES MELLITUS WITH HYPERGLYCEMIA, WITHOUT LONG-TERM CURRENT USE OF INSULIN (H): ICD-10-CM

## 2025-03-03 RX ORDER — PEN NEEDLE, DIABETIC 32GX 5/32"
NEEDLE, DISPOSABLE MISCELLANEOUS
Qty: 100 EACH | Refills: 1 | Status: SHIPPED | OUTPATIENT
Start: 2025-03-03

## 2025-03-17 ENCOUNTER — PATIENT OUTREACH (OUTPATIENT)
Dept: CARE COORDINATION | Facility: CLINIC | Age: 60
End: 2025-03-17
Payer: COMMERCIAL

## 2025-03-27 ENCOUNTER — OFFICE VISIT (OUTPATIENT)
Dept: PHARMACY | Facility: CLINIC | Age: 60
End: 2025-03-27
Payer: COMMERCIAL

## 2025-03-27 VITALS — DIASTOLIC BLOOD PRESSURE: 77 MMHG | HEART RATE: 85 BPM | SYSTOLIC BLOOD PRESSURE: 145 MMHG | OXYGEN SATURATION: 98 %

## 2025-03-27 DIAGNOSIS — R60.9 EDEMA, UNSPECIFIED TYPE: ICD-10-CM

## 2025-03-27 DIAGNOSIS — E78.2 MIXED HYPERLIPIDEMIA: ICD-10-CM

## 2025-03-27 DIAGNOSIS — Z78.9 TAKES DIETARY SUPPLEMENTS: ICD-10-CM

## 2025-03-27 DIAGNOSIS — I10 BENIGN ESSENTIAL HYPERTENSION: ICD-10-CM

## 2025-03-27 DIAGNOSIS — F32.0 CURRENT MILD EPISODE OF MAJOR DEPRESSIVE DISORDER WITHOUT PRIOR EPISODE: ICD-10-CM

## 2025-03-27 DIAGNOSIS — J30.89 SEASONAL ALLERGIC RHINITIS DUE TO OTHER ALLERGIC TRIGGER: ICD-10-CM

## 2025-03-27 DIAGNOSIS — E11.65 TYPE 2 DIABETES MELLITUS WITH HYPERGLYCEMIA, WITHOUT LONG-TERM CURRENT USE OF INSULIN (H): Primary | ICD-10-CM

## 2025-03-27 RX ORDER — CHOLECALCIFEROL (VITAMIN D3) 50 MCG
1 TABLET ORAL EVERY OTHER DAY
COMMUNITY

## 2025-03-27 NOTE — PATIENT INSTRUCTIONS
"Recommendations from today's MTM visit:                                                    MTM (medication therapy management) is a service provided by a clinical pharmacist designed to help you get the most of out of your medicines.   Today we reviewed what your medicines are for, how to know if they are working, that your medicines are safe and how to make your medicine regimen as easy as possible.      Mounjaro - I am ordering this to start the PA process, once this is approved we will have you switch from Ozempic to Mounjaro 5mg weekly   Stop fenofibrate - we will get an updated cholesterol panel to see if you need this medication, ok to wait until your wellness visit with Sole 7/11/25    Follow-up: Return in 4 weeks (on 4/24/2025) for Follow up, with me, in person.    It was great speaking with you today.  I value your experience and would be very thankful for your time in providing feedback in our clinic survey. In the next few days, you may receive an email or text message from [employer] with a link to a survey related to your  clinical pharmacist.\"     To schedule another MTM appointment, please call the clinic directly or you may call the MTM scheduling line at [phone].    My Clinical Pharmacist's contact information:                                                      Please feel free to contact me with any questions or concerns you have.      Marj Nuñez, Bria  Medication Therapy Management (MTM) Pharmacist   [facility]   "

## 2025-03-27 NOTE — PROGRESS NOTES
Medication Therapy Management (MTM) Encounter    ASSESSMENT:                            Medication Adherence/Access: No issues identified.    Diabetes   Stable.  Patient is meeting A1c goal of < 7%.  Patient is meeting goal of > 70% time in target with continuous glucose monitoring.   Patient would benefit from switching to Mounjaro from Ozempic, potential to deprescribe other diabetes meds if able to titrate dose for more potent blood sugar lowering effect, may be more beneficial for weight loss.     Hypertension/Edema  Stable. Patient is not meeting blood pressure goal of < 130/80mmHg today in clinic though she did have caffeine shortly beforehand therefore may have increased blood pressure, will continue to monitor with future visits.    Hyperlipidemia   Stable, on high intensity statin, LDL <70. Patient's triglycerides have been well controlled for several years, will have her trial off fenofibrate with repeat lipid panel when she has her annual visit with primary care provider later this year.      Allergy   Stable.      Mental Health   Depression  Stable.     Supplements   Stable, vitamin D within normal limits on current supplementation.     PLAN:                            Mounjaro - I am ordering this to start the PA process, once this is approved we will have you switch from Ozempic to Mounjaro 5mg weekly   Stop fenofibrate - we will get an updated cholesterol panel to see if you need this medication, ok to wait until your wellness visit with [facility] 7/11/25    Follow-up: Return in 4 weeks (on 4/24/2025) for Follow up, with me, in person.    SUBJECTIVE/OBJECTIVE:                          Oh Damico is a 59 year old female seen for an initial visit with this MT pharmacist. She was previously followed by [facility] pharmacist Evonne Olea, PharmD - last seen 1/4/24.      Reason for visit: medication review.    Allergies/ADRs: Reviewed in chart  Past Medical History: Reviewed in chart  Tobacco: She reports that she  has never smoked. She has been exposed to tobacco smoke. She has never used smokeless tobacco.  Alcohol: Less than 1 beverage / month    Medication Adherence/Access: no issues reported.    Diabetes   Ozempic 2mg weekly - previously on Trulicity, no issues since making change, continues to be beneficial   Metformin 1000mg twice daily   Tresiba 20 units daily   Aspirin 81mg daily  Side effects? Denies   Has noticed some weight loss since continuing on GLP-1, was 179 lbs this morning      Blood sugar monitoring: Continuous Glucose Monitor see AGP below      Eye exam is up to date  Foot exam is up to date    Wt Readings from Last 10 Encounters:   01/24/25 184 lb (83.5 kg)   10/15/24 188 lb (85.3 kg)   07/10/24 190 lb (86.2 kg)   12/12/23 188 lb 1.6 oz (85.3 kg)   06/06/23 187 lb 9.6 oz (85.1 kg)   12/02/22 194 lb (88 kg)   06/06/22 201 lb 12.8 oz (91.5 kg)   02/04/22 204 lb (92.5 kg)   11/30/21 200 lb 12.8 oz (91.1 kg)   05/07/21 193 lb 4.8 oz (87.7 kg)     Hypertension /Edema  Hydrochlorothiazide 25mg daily - also to prevent the progression of vericose veins and edema, stable    Patient reports no current medication side effects  Denies home monitoring - does not have a cuff.   Patient reports she did drink caffeine before coming into today's visit.      BP Readings from Last 3 Encounters:   03/27/25 (!) 145/77   01/24/25 128/87   10/15/24 110/68        Hyperlipidemia   Rosuvastatin 20mg daily   Fenofibrate 145mg daily - patient reports she has been on this medication for a long time, does not remember how high her triglycerides where when she started  Patient reports no significant myalgias or other side effects.  Patient reports she has significant cardiac event history in her family.   The ASCVD Risk score (Vel DK, et al., 2019) failed to calculate for the following reasons:    The valid total cholesterol range is 130 to 320 mg/dL      Allergy   Loratadine 10mg daily   Side effects? Denies   Spring allergies     Has not tolerated Flonase in the past, Allegra ineffective.   Patient notes that they are allergic to their cat.   Patient feels that current therapy is effective.      Mental Health   Depression  Bupropion XL 150mg daily   Patient notes that this has been effective for mood. Winter is challenging but has been getting better with change in season.   Patient reports no current medication side effects.        12/12/2023     8:03 AM 7/9/2024     9:08 AM 1/24/2025     7:49 AM   PHQ   PHQ-9 Total Score 1 4 5    Q9: Thoughts of better off dead/self-harm past 2 weeks Not at all  Not at all Not at all       Patient-reported    Proxy-reported        Supplements   Vitamin D 2000 international unit(s) every other day, will be decreasing to 1000 international unit(s) daily after finishing supply - following elevated vitamin d level   Multivitamin 1 tablet daily - has 1000 international unit(s) of vitamin d in it   No reported issues at this time.        Today's Vitals: BP (!) 145/77   Pulse 85   SpO2 98%   ----------------    I spent 60 minutes with this patient today. All changes were made via collaborative practice agreement with ROB Dominguez CNP.     A summary of these recommendations was given to the patient.    Marj Nuñez, PharmD  Medication Therapy Management (MTM) Pharmacist   [place] [facility]     Medication Therapy Recommendations  Mixed hyperlipidemia   1 Current Medication: fenofibrate (TRICOR) 145 MG tablet (Discontinued)   Current Medication Sig: Take 1 tablet (145 mg) by mouth daily   Rationale: No medical indication at this time - Unnecessary medication therapy - Indication   Recommendation: Discontinue Medication   Status: Accepted per CPA   Identified Date: 3/27/2025 Completed Date: 3/27/2025         Type 2 diabetes mellitus with hyperglycemia, without long-term current use of insulin (H)   1 Current Medication: Semaglutide, 2 MG/DOSE, (OZEMPIC) 8 MG/3ML pen (Discontinued)    Current Medication Sig: Inject 2 mg subcutaneously every 7 days.   Rationale: More effective medication available - Ineffective medication - Effectiveness   Recommendation: Change Medication - Mounjaro 5 MG/0.5ML Soaj   Status: Accepted per CPA   Identified Date: 3/27/2025 Completed Date: 3/27/2025

## 2025-03-27 NOTE — Clinical Note
Jacoby - I met with Oh for a med review. We are going to try switching her to mounjaro since she would like to consolidate meds if able, may be able to discontinue Tresiba. I am also going to have her trial off fenofibrate and get updated lipids when she sees you later this year, her triglycerides have been very sable and controlled over the last few years. Thanks!

## 2025-04-23 DIAGNOSIS — F32.0 CURRENT MILD EPISODE OF MAJOR DEPRESSIVE DISORDER WITHOUT PRIOR EPISODE: ICD-10-CM

## 2025-04-23 RX ORDER — BUPROPION HYDROCHLORIDE 150 MG/1
150 TABLET ORAL EVERY MORNING
Qty: 90 TABLET | Refills: 3 | OUTPATIENT
Start: 2025-04-23

## 2025-04-24 ENCOUNTER — OFFICE VISIT (OUTPATIENT)
Dept: PHARMACY | Facility: CLINIC | Age: 60
End: 2025-04-24
Payer: COMMERCIAL

## 2025-04-24 VITALS — HEART RATE: 87 BPM | OXYGEN SATURATION: 97 % | SYSTOLIC BLOOD PRESSURE: 127 MMHG | DIASTOLIC BLOOD PRESSURE: 60 MMHG

## 2025-04-24 DIAGNOSIS — E11.65 TYPE 2 DIABETES MELLITUS WITH HYPERGLYCEMIA, WITHOUT LONG-TERM CURRENT USE OF INSULIN (H): Primary | ICD-10-CM

## 2025-04-24 DIAGNOSIS — R60.9 EDEMA, UNSPECIFIED TYPE: ICD-10-CM

## 2025-04-24 DIAGNOSIS — I10 BENIGN ESSENTIAL HYPERTENSION: ICD-10-CM

## 2025-04-24 RX ORDER — INSULIN DEGLUDEC 100 U/ML
17 INJECTION, SOLUTION SUBCUTANEOUS DAILY
Status: SHIPPED
Start: 2025-04-24

## 2025-04-24 NOTE — PATIENT INSTRUCTIONS
"Recommendations from today's MTM visit:                                                       Mounjaro - increase to 7.5mg weekly with next injection   When you increase Mounjaro, decrease Tresiba by 3 units to 17 units daily ---> give 3 days to see the impact of this change, can decrease by another 3 units if needed to prevent lows       Follow-up: Return in 4 weeks (on 5/22/2025) for Follow up, with me, in person.    It was great speaking with you today.  I value your experience and would be very thankful for your time in providing feedback in our clinic survey. In the next few days, you may receive an email or text message from [employer] with a link to a survey related to your  clinical pharmacist.\"     To schedule another MTM appointment, please call the clinic directly or you may call the MTM scheduling line at [phone].    My Clinical Pharmacist's contact information:                                                      Please feel free to contact me with any questions or concerns you have.      Marj Nuñez, Bria  Medication Therapy Management (MTM) Pharmacist   [facility], [place]     "

## 2025-04-24 NOTE — PROGRESS NOTES
Medication Therapy Management (MTM) Encounter    ASSESSMENT:                            Medication Adherence/Access: No issues identified.    Diabetes   Stable.  Patient is meeting A1c goal of < 7%.  Patient is meeting goal of > 70% time in target with continuous glucose monitoring.   Switch from Ozempic to Mounjaro went well. Will have patient continue to increase Mounjaro and decrease Tresiba dose. Continue to work on lifestyle changes alongside medications.      Hypertension/Edema  Stable. Patient is meeting blood pressure goal of < 130/80mmHg today in clinic.     PLAN:                            Mounjaro - increase to 7.5mg weekly with next injection   When you increase Mounjaro, decrease Tresiba by 3 units to 17 units daily ---> give 3 days to see the impact of this change, can decrease by another 3 units if needed to prevent lows       Follow-up: Return in 4 weeks (on 5/22/2025) for Follow up, with me, in person.    SUBJECTIVE/OBJECTIVE:                          Oh Damico is a 59 year old female seen for a follow-up visit.       Reason for visit: diabetes, blood pressure.    Allergies/ADRs: Reviewed in chart  Past Medical History: Reviewed in chart  Tobacco: She reports that she has never smoked. She has been exposed to tobacco smoke. She has never used smokeless tobacco.  Alcohol:  Less than 1 beverage / month     Medication Adherence/Access: no issues reported.    Diabetes   Mounjaro 5mg weekly (Mondays) - switched from Ozempic at last MTM visit, has had less nausea than other GLP-1, has done 4 doses    Metformin 1000mg twice daily   Tresiba 20 units daily   Aspirin 81mg daily  Side effects? Denies   Physical activity - has been focusing on walking, has an ave to track this   Has noticed some weight loss since continuing on GLP-1, was 179 lbs last visit, down to 177 lbs this AM      Blood sugar monitoring: Continuous Glucose Monitor see AGP below      Eye exam is up to date  Foot exam is up to  date    Hypertension /Edema  Hydrochlorothiazide 25mg daily - also to prevent the progression of vericose veins and edema, stable    Patient reports no current medication side effects  Denies home monitoring - does not have a cuff.     BP Readings from Last 3 Encounters:   04/24/25 127/60   03/27/25 (!) 145/77   01/24/25 128/87          Today's Vitals: /60   Pulse 87   SpO2 97%   ----------------      I spent 25 minutes with this patient today. All changes were made via collaborative practice agreement with ROB Dominguez CNP.     A summary of these recommendations was given to the patient.    Marj Nuñez, PharmD  Medication Therapy Management (MTM) Pharmacist   [place] and [facility]         Medication Therapy Recommendations  Type 2 diabetes mellitus with hyperglycemia, without long-term current use of insulin (H)   1 Current Medication: tirzepatide (MOUNJARO) 5 MG/0.5ML SOAJ auto-injector pen (Discontinued)   Current Medication Sig: Inject 0.5 mLs (5 mg) subcutaneously once a week.   Rationale: Dose too low - Dosage too low - Effectiveness   Recommendation: Increase Dose - Mounjaro 7.5 MG/0.5ML Soaj   Status: Accepted per CPA   Identified Date: 4/24/2025 Completed Date: 4/24/2025

## 2025-05-22 ENCOUNTER — OFFICE VISIT (OUTPATIENT)
Dept: PHARMACY | Facility: CLINIC | Age: 60
End: 2025-05-22
Payer: COMMERCIAL

## 2025-05-22 DIAGNOSIS — E11.65 TYPE 2 DIABETES MELLITUS WITH HYPERGLYCEMIA, WITHOUT LONG-TERM CURRENT USE OF INSULIN (H): Primary | ICD-10-CM

## 2025-05-22 RX ORDER — INSULIN DEGLUDEC 100 U/ML
14 INJECTION, SOLUTION SUBCUTANEOUS DAILY
Status: SHIPPED
Start: 2025-05-22

## 2025-05-22 NOTE — PROGRESS NOTES
Medication Therapy Management (MTM) Encounter    ASSESSMENT:                            Medication Adherence/Access: No issues identified.    Diabetes   Stable.  Patient is meeting A1c goal of < 7%.  Patient is meeting goal of > 70% time in target with continuous glucose monitoring.   Mounjaro dose increase went well. Will have patient continue to increase Mounjaro and decrease Tresiba dose. Continue to work on lifestyle changes alongside medications.     PLAN:                            Mounjaro - increase to 10mg weekly. I sent the prescription for the 12.5mg strength to your pharmacy for the next dose increase after this one.   When you increase Mounjaro, decrease Tresiba by 3 units to 14 units daily     Follow-up: Return in 1 month (on 6/23/2025) for Follow up, with me, using a phone visit @ 9 AM.    SUBJECTIVE/OBJECTIVE:                          Oh Damico is a 59 year old female seen for a follow-up visit.       Reason for visit: diabetes.    Allergies/ADRs: Reviewed in chart  Past Medical History: Reviewed in chart  Tobacco: She reports that she has never smoked. She has been exposed to tobacco smoke. She has never used smokeless tobacco.  Alcohol:  Less than 1 beverage / month     Medication Adherence/Access: no issues reported.    Diabetes   Mounjaro 7.5mg weekly (Mondays) - dose increased at last MTM visit, has noticed a little more appetite suppression and decrease in cravings to eat   Metformin 1000mg twice daily   Tresiba 17 units daily - dose decreased at last MTM visit  Aspirin 81mg daily  Side effects? Denies. Continues to have less GI side effects with Mounjaro than previous GLP-1 trial of Ozempic   Physical activity - has been focusing on walking, has an ave to track this, focusing on this more with the warm weather      Blood sugar monitoring: Continuous Glucose Monitor see AGP below - denies hypoglycemia      Eye exam is up to date  Foot exam is up to date       Today's Vitals: There were no  vitals taken for this visit.  ----------------      I spent 20 minutes with this patient today. All changes were made via collaborative practice agreement with ROB Dominguez CNP.     A summary of these recommendations was given to the patient.    Marj Nuñez, Bria  Medication Therapy Management (MTM) Pharmacist   [facility]         Medication Therapy Recommendations  Type 2 diabetes mellitus with hyperglycemia, without long-term current use of insulin (H)   1 Current Medication: Tirzepatide (MOUNJARO) 7.5 MG/0.5ML SOAJ auto-injector pen (Discontinued)   Current Medication Sig: Inject 0.5 mLs (7.5 mg) subcutaneously once a week.   Rationale: Dose too low - Dosage too low - Effectiveness   Recommendation: Increase Dose - tirzepatide 10 MG/0.5ML Soaj auto-injector pen   Status: Accepted per CPA   Identified Date: 5/22/2025 Completed Date: 5/22/2025

## 2025-05-22 NOTE — PATIENT INSTRUCTIONS
"Recommendations from today's MTM visit:                                                       Mounjaro - increase to 10mg weekly. I sent the prescription for the 12.5mg strength to your pharmacy for the next dose increase after this one.   When you increase Mounjaro, decrease Tresiba by 3 units to 14 units daily     Follow-up: Return in 1 month (on 6/23/2025) for Follow up, with me, using a phone visit @ 9 AM.    It was great speaking with you today.  I value your experience and would be very thankful for your time in providing feedback in our clinic survey. In the next few days, you may receive an email or text message from [facility] [employer] with a link to a survey related to your  clinical pharmacist.\"     To schedule another MTM appointment, please call the clinic directly or you may call the MTM scheduling line at [phone].    My Clinical Pharmacist's contact information:                                                      Please feel free to contact me with any questions or concerns you have.      Marj Nuñez PharmD  Medication Therapy Management (MTM) Pharmacist   [place] and [facility]     "

## 2025-06-21 DIAGNOSIS — R60.9 EDEMA, UNSPECIFIED TYPE: ICD-10-CM

## 2025-06-21 DIAGNOSIS — E78.2 MIXED HYPERLIPIDEMIA: ICD-10-CM

## 2025-06-23 ENCOUNTER — VIRTUAL VISIT (OUTPATIENT)
Dept: PHARMACY | Facility: CLINIC | Age: 60
End: 2025-06-23
Payer: COMMERCIAL

## 2025-06-23 DIAGNOSIS — Z79.4 TYPE 2 DIABETES MELLITUS WITHOUT COMPLICATION, WITH LONG-TERM CURRENT USE OF INSULIN (H): ICD-10-CM

## 2025-06-23 DIAGNOSIS — E11.9 TYPE 2 DIABETES MELLITUS WITHOUT COMPLICATION, WITH LONG-TERM CURRENT USE OF INSULIN (H): ICD-10-CM

## 2025-06-23 DIAGNOSIS — E11.65 TYPE 2 DIABETES MELLITUS WITH HYPERGLYCEMIA, WITHOUT LONG-TERM CURRENT USE OF INSULIN (H): Primary | ICD-10-CM

## 2025-06-23 PROCEDURE — 99207 PR NO CHARGE LOS: CPT | Mod: 93 | Performed by: PHARMACIST

## 2025-06-23 RX ORDER — HYDROCHLOROTHIAZIDE 12.5 MG/1
CAPSULE ORAL
Qty: 6 EACH | Refills: 1 | Status: SHIPPED | OUTPATIENT
Start: 2025-06-23

## 2025-06-23 RX ORDER — ROSUVASTATIN CALCIUM 20 MG/1
20 TABLET, COATED ORAL DAILY
Qty: 90 TABLET | Refills: 3 | Status: SHIPPED | OUTPATIENT
Start: 2025-06-23

## 2025-06-23 RX ORDER — HYDROCHLOROTHIAZIDE 25 MG/1
25 TABLET ORAL DAILY
Qty: 90 TABLET | Refills: 1 | Status: SHIPPED | OUTPATIENT
Start: 2025-06-23

## 2025-06-23 RX ORDER — INSULIN DEGLUDEC 100 U/ML
11 INJECTION, SOLUTION SUBCUTANEOUS DAILY
Status: SHIPPED
Start: 2025-06-23

## 2025-06-23 NOTE — PROGRESS NOTES
Medication Therapy Management (MTM) Encounter    ASSESSMENT:                            Medication Adherence/Access: No issues identified.    Diabetes   Stable.  Patient is meeting A1c goal of < 7%.  Patient is meeting goal of > 70% time in target with continuous glucose monitoring.   Mounjaro dose increase went well. Will have patient continue to increase Mounjaro and decrease Tresiba dose. Continue to work on lifestyle changes alongside medications.   Will transition patient to jarvis 3+ sensor after she finishes using current supply of 14-day sensors.     PLAN:                            Mounjaro - today increase to 12.5mg weekly and decrease Tresiba by 3 units to 11 units daily; After 4 doses, then increase to 15mg weekly and decrease Tresiba by 3 units to 8 units      FreeStyle Jarvis - I sent a new prescription for the jarvis 3+ sensor which we will switch you to after finishing your current supply of 14 day sensors     Follow-up: Return in 7 weeks (on 8/11/2025) for Follow up, with me, using a phone visit.    SUBJECTIVE/OBJECTIVE:                          Oh Damico is a 59 year old female seen for a follow-up visit.       Reason for visit: diabetes.    Allergies/ADRs: Reviewed in chart  Past Medical History: Reviewed in chart  Tobacco: She reports that she has never smoked. She has been exposed to tobacco smoke. She has never used smokeless tobacco.  Alcohol: Less than 1 beverage / month    Medication Adherence/Access: no issues reported.    Diabetes   Mounjaro 10mg weekly (Mondays) - dose increased at last MTM visit, endorses appetite suppression, completed fourth dose last week and plan to increase to 12.5 mg weekly today  Metformin 1000mg twice daily   Tresiba 14 units daily - dose decreased at last MTM visit  Aspirin 81mg daily  Side effects? Denies. Continues to have less GI side effects with Mounjaro than previous GLP-1 trial of Ozempic   Physical activity - has been focusing on walking, has an ave to  track this, focusing on this more with the warm weather   Has been more hungry for fruit, did not notice a significant change in weight over the last month     Blood sugar monitoring: Continuous Glucose Monitor see AGP below -of note, patient has jarvis 14-day sensor      Eye exam is up to date  Foot exam is up to date    Today's Vitals: There were no vitals taken for this visit.  ----------------      I spent 15 minutes with this patient today. All changes were made via collaborative practice agreement with ROB Dominguez CNP.     A summary of these recommendations was given to the patient.    Jacob EspinosaD  Medication Therapy Management (MTM) Pharmacist   [facility]      Telemedicine Visit Details  The patient's medications can be safely assessed via a telemedicine encounter.  Type of service:  Telephone visit  Originating Location (pt. Location): Home    Distant Location (provider location):  On-site  Start Time: 9:10 am  End Time: 9:25am      Medication Therapy Recommendations  Type 2 diabetes mellitus with hyperglycemia, without long-term current use of insulin (H)   1 Current Medication: Continuous Glucose Sensor (FREESTYLE JARVIS 14 DAY SENSOR) MISC (Discontinued)   Current Medication Sig: Change every 14 days.   Rationale: Medication product not available - Adherence - Adherence   Recommendation: Change Medication - FreeStyle Jarvis 3 Plus Sensor Misc   Status: Accepted - no CPA Needed   Identified Date: 6/23/2025 Completed Date: 6/23/2025         2 Current Medication: insulin degludec (TRESIBA FLEXTOUCH) 100 UNIT/ML pen (Discontinued)   Current Medication Sig: Inject 14 Units subcutaneously daily.   Rationale: Dose too high - Dosage too high - Safety   Recommendation: Decrease Dose   Status: Accepted per CPA   Identified Date: 6/23/2025 Completed Date: 6/23/2025         3 Current Medication: tirzepatide (MOUNJARO) 10 MG/0.5ML SOAJ auto-injector pen (Discontinued)   Current  Medication Sig: Inject 0.5 mLs (10 mg) subcutaneously once a week.   Rationale: Dose too low - Dosage too low - Effectiveness   Recommendation: Increase Dose - Mounjaro 12.5 MG/0.5ML Soaj   Status: Accepted per CPA   Identified Date: 6/23/2025 Completed Date: 6/23/2025

## 2025-06-24 NOTE — PATIENT INSTRUCTIONS
"Recommendations from today's MTM visit:                                                       Mounjaro - today increase to 12.5mg weekly and decrease Tresiba by 3 units to 11 units daily; After 4 doses, then increase to 15mg weekly and decrease Tresiba by 3 units to 8 units      FreeStyle Jarvis - I sent a new prescription for the jarvis 3+ sensor which we will switch you to after finishing your current supply of 14 day sensors     Follow-up: Return in 7 weeks (on 8/11/2025) for Follow up, with me, using a phone visit.    It was great speaking with you today.  I value your experience and would be very thankful for your time in providing feedback in our clinic survey. In the next few days, you may receive an email or text message from [employer] with a link to a survey related to your  clinical pharmacist.\"     To schedule another MTM appointment, please call the clinic directly or you may call the MTM scheduling line at [phone].    My Clinical Pharmacist's contact information:                                                      Please feel free to contact me with any questions or concerns you have.      Marj Nuñez, Bria  Medication Therapy Management (MTM) Pharmacist   [facility]     "

## 2025-06-26 DIAGNOSIS — E11.65 TYPE 2 DIABETES MELLITUS WITH HYPERGLYCEMIA, WITHOUT LONG-TERM CURRENT USE OF INSULIN (H): ICD-10-CM

## 2025-07-28 ENCOUNTER — OFFICE VISIT (OUTPATIENT)
Dept: FAMILY MEDICINE | Facility: CLINIC | Age: 60
End: 2025-07-28
Payer: COMMERCIAL

## 2025-07-28 ENCOUNTER — TRANSFERRED RECORDS (OUTPATIENT)
Dept: HEALTH INFORMATION MANAGEMENT | Facility: CLINIC | Age: 60
End: 2025-07-28

## 2025-07-28 VITALS
HEIGHT: 61 IN | SYSTOLIC BLOOD PRESSURE: 110 MMHG | DIASTOLIC BLOOD PRESSURE: 64 MMHG | BODY MASS INDEX: 33.23 KG/M2 | OXYGEN SATURATION: 97 % | WEIGHT: 176 LBS | RESPIRATION RATE: 16 BRPM | HEART RATE: 88 BPM | TEMPERATURE: 98.9 F

## 2025-07-28 DIAGNOSIS — E11.65 TYPE 2 DIABETES MELLITUS WITH HYPERGLYCEMIA, WITHOUT LONG-TERM CURRENT USE OF INSULIN (H): ICD-10-CM

## 2025-07-28 DIAGNOSIS — Z01.818 PREOP GENERAL PHYSICAL EXAM: ICD-10-CM

## 2025-07-28 DIAGNOSIS — S92.351G CLOSED DISPLACED FRACTURE OF FIFTH METATARSAL BONE OF RIGHT FOOT WITH DELAYED HEALING, SUBSEQUENT ENCOUNTER: Primary | ICD-10-CM

## 2025-07-28 PROBLEM — K42.0 INCARCERATED UMBILICAL HERNIA: Status: RESOLVED | Noted: 2019-12-17 | Resolved: 2025-07-28

## 2025-07-28 PROCEDURE — 93000 ELECTROCARDIOGRAM COMPLETE: CPT | Performed by: FAMILY MEDICINE

## 2025-07-28 PROCEDURE — 3074F SYST BP LT 130 MM HG: CPT | Performed by: FAMILY MEDICINE

## 2025-07-28 PROCEDURE — 99214 OFFICE O/P EST MOD 30 MIN: CPT | Performed by: FAMILY MEDICINE

## 2025-07-28 PROCEDURE — 3078F DIAST BP <80 MM HG: CPT | Performed by: FAMILY MEDICINE

## 2025-07-28 NOTE — LETTER
My Depression Action Plan  Name: Josefina Damico   Date of Birth 1965  Date: 7/28/2025    My doctor: Sole Ch   My clinic: [address] [address] [phone]            GREEN    ZONE   Good Control    What it looks like:   Things are going generally well. You have normal ups and downs. You may even feel depressed from time to time, but bad moods usually last less than a day.   What you need to do:  Continue to care for yourself (see self care plan)  Check your depression survival kit and update it as needed  Follow your physician s recommendations including any medication.  Do not stop taking medication unless you consult with your physician first.             YELLOW         ZONE Getting Worse    What it looks like:   Depression is starting to interfere with your life.   It may be hard to get out of bed; you may be starting to isolate yourself from others.  Symptoms of depression are starting to last most all day and this has happened for several days.   You may have suicidal thoughts but they are not constant.   What you need to do:     Call your care team. Your response to treatment will improve if you keep your care team informed of your progress. Yellow periods are signs an adjustment may need to be made.     Continue your self-care.  Just get dressed and ready for the day.  Don't give yourself time to talk yourself out of it.    Talk to someone in your support network.    Open up your Depression Self-Care Plan/Wellness Kit.             RED    ZONE Medical Alert - Get Help    What it looks like:   Depression is seriously interfering with your life.   You may experience these or other symptoms: You can t get out of bed most days, can t work or engage in other necessary activities, you have trouble taking care of basic hygiene, or basic responsibilities, thoughts of suicide or death that will not go away, self-injurious  behavior.     What you need to do:  Call your care team and request a same-day appointment. If they are not available (weekends or after hours) call your local crisis line, emergency room or 911.          Depression Self-Care Plan / Wellness Kit    Many people find that medication and therapy are helpful treatments for managing depression. In addition, making small changes to your everyday life can help to boost your mood and improve your wellbeing. Below are some tips for you to consider. Be sure to talk with your medical provider and/or behavioral health consultant if your symptoms are worsening or not improving.     Sleep   Sleep hygiene  means all of the habits that support good, restful sleep. It includes maintaining a consistent bedtime and wake time, using your bedroom only for sleeping or sex, and keeping the bedroom dark and free of distractions like a computer, smartphone, or television.     Develop a Healthy Routine  Maintain good hygiene. Get out of bed in the morning, make your bed, brush your teeth, take a shower, and get dressed. Don t spend too much time viewing media that makes you feel stressed. Find time to relax each day.    Exercise  Get some form of exercise every day. This will help reduce pain and release endorphins, the  feel good  chemicals in your brain. It can be as simple as just going for a walk or doing some gardening, anything that will get you moving.      Diet  Strive to eat healthy foods, including fruits and vegetables. Drink plenty of water. Avoid excessive sugar, caffeine, alcohol, and other mood-altering substances.     Stay Connected with Others  Stay in touch with friends and family members.    Manage Your Mood  Try deep breathing, massage therapy, biofeedback, or meditation. Take part in fun activities when you can. Try to find something to smile about each day.     Psychotherapy  Be open to working with a therapist if your provider recommends it.     Medication  Be sure to  take your medication as prescribed. Most anti-depressants need to be taken every day. It usually takes several weeks for medications to work. Not all medicines work for all people. It is important to follow-up with your provider to make sure you have a treatment plan that is working for you. Do not stop your medication abruptly without first discussing it with your provider.    Crisis Resources   These hotlines are for both adults and children. They and are open 24 hours a day, 7 days a week unless noted otherwise.    National Suicide Prevention Lifeline   988 or 9-090-514-EDUU (2277)    Crisis Text Line    www.crisistextline.org  Text HOME to 953916 from anywhere in the [place], anytime, about any type of crisis. A live, trained crisis counselor will receive the text and respond quickly.    [facility] Lifeline for LGBTQ Youth  A national crisis intervention and suicide lifeline for LGBTQ youth under 25. Provides a safe place to talk without judgement. Call 1-296.540.4541; text START to 061666 or visit www.thetrevorproject.org to talk to a trained counselor.    For Count includes the [facility] crisis numbers, visit the [facility] website at:  https://[place].gov/dhs/people-we-serve/adults/health-care/mental-health/resources/crisis-contacts.jsp

## 2025-07-28 NOTE — PATIENT INSTRUCTIONS
How to Take Your Medication Before Surgery  Preoperative Medication Instructions   Antiplatelet or Anticoagulation Medication Instructions   - aspirin: Discontinue aspirin 7 days prior to procedure to reduce bleeding risk. It should be resumed postoperatively.     Additional Medication Instructions   - Long acting insulin (e.g. glargine, detemir): Take 80% of the usual evening or morning dose before surgery.     - metformin: DO NOT TAKE day of surgery.   - GLP-1 Injectable (exenitide, liraglutide, semaglutide, dulaglutide, etc.): DO NOT TAKE 7 days before surgery        Patient Education   Preparing for Your Surgery  For Adults  Getting started  In most cases, a nurse will call to review your health history and instructions. They will give you an arrival time based on your scheduled surgery time. Please be ready to share:  Your doctor's clinic name and phone number  Your medical, surgical, and anesthesia history  A list of allergies and sensitivities  A list of medicines, including herbal treatments and over-the-counter drugs  Whether the patient has a legal guardian (ask how to send us the papers in advance)  Note: You may not receive a call if you were seen at our PAC (Preoperative Assessment Center).  Please tell us if you're pregnant--or if there's any chance you might be pregnant. Some surgeries may injure a fetus (unborn baby), so they require a pregnancy test. Surgeries that are safe for a fetus don't always need a test, and you can choose whether to have one.   Preparing for surgery  Within 10 to 30 days of surgery: Have a pre-op exam (sometimes called an H&P, or History and Physical). This can be done at a clinic or pre-operative center.  If you're having a , you may not need this exam. Talk to your care team.  At your pre-op exam, talk to your care team about all medicines you take. (This includes CBD oil and any drugs, such as THC, marijuana, and other forms of cannabis.) If you need to stop any  medicine before surgery, ask when to start taking it again.  This is for your safety. Many medicines and drugs can make you bleed too much during surgery. Some change how well surgery (anesthesia) drugs work.  Call your insurance company to let them know you're having surgery. (If you don't have insurance, call [phone].)  Call your clinic if there's any change in your health. This includes a scrape or scratch near the surgery site, or any signs of a cold (sore throat, runny nose, cough, rash, fever).  Eating and drinking guidelines  For your safety: Unless your surgeon tells you otherwise, follow the guidelines below.  Eat and drink as normal until 8 hours before you arrive for surgery. After that, no food or milk. You can spit out gum when you arrive.  Drink clear liquids until 2 hours before you arrive. These are liquids you can see through, like water, Gatorade, and Propel Water. They also include plain black coffee and tea (no cream or milk).  No alcohol for 24 hours before you arrive. The night before surgery, stop any drinks that contain THC.  If your care team tells you to take medicine on the morning of surgery, it's okay to take it with a sip of water. No other medicines or drugs are allowed (including CBD oil)--follow your care team's instructions.  If you have questions the day of surgery, call your hospital or surgery center.   Preventing infection  Shower or bathe the night before and the morning of surgery. Follow the instructions your clinic gave you. (If no instructions, use regular soap.)  Don't shave or clip hair near your surgery site. We'll remove the hair if needed.  Don't smoke or vape the morning of surgery. No chewing tobacco for 6 hours before you arrive. A nicotine patch is okay. You may spit out nicotine gum when you arrive.  For some surgeries, the surgeon will tell you to fully quit smoking and nicotine.  We will make every effort to keep you safe from infection. We will:  Clean our  hands often with soap and water (or an alcohol-based hand rub).  Clean the skin at your surgery site with a special soap that kills germs.  Give you a special gown to keep you warm. (Cold raises the risk of infection.)  Wear hair covers, masks, gowns, and gloves during surgery.  Give antibiotic medicine, if prescribed. Not all surgeries need this medicine.  What to bring on the day of surgery  Photo ID and insurance card  Copy of your health care directive, if you have one  Glasses and hearing aids (bring cases)  You can't wear contacts during surgery  Inhaler and eye drops, if you use them (tell us about these when you arrive)  CPAP machine or breathing device, if you use them  A few personal items, if spending the night  If you have . . .  A pacemaker, ICD (cardiac defibrillator), or other implant: Bring the ID card.  An implanted stimulator: Bring the remote control.  A legal guardian: Bring a copy of the certified (court-stamped) guardianship papers.  Please remove any jewelry, including body piercings. Leave jewelry and other valuables at home.  If you're going home the day of surgery  You must have a support person drive you home. They should stay with you overnight, and they may need to help with your self-care.  If you don't have a support person, please tells us as soon as possible. We can help.  After surgery  If it's hard to control your pain or you need more pain medicine, please call your surgeon's office.  Questions?   If you have any questions for your care team, list them here:   ____________________________________________________________________________________________________________________________________________________________________________________________________________________________________________________________  For informational purposes only. Not to replace the advice of your health care provider. Copyright   2003, 2019 [facility]. All rights reserved. Clinically  reviewed by Oscar Hoffman MD. Babyoye [id number] - REV 02/25.

## 2025-07-28 NOTE — PROGRESS NOTES
Preoperative Evaluation  [facility]  [address] [address]  Phone: [phone]  Fax: [phone]  Primary Provider: ROB Dominguez CNP  Pre-op Performing Provider: Kelly Teixeira MD  Jul 28, 2025 7/28/2025   Surgical Information   What procedure is being done? surgery on my foot   Facility or Hospital where procedure/surgery will be performed: [facility]   Who is doing the procedure / surgery? Dr. BRENDA Villatoro   Date of surgery / procedure: 7/30/2025   Time of surgery / procedure: unkniwb   Where do you plan to recover after surgery? at home with family     Fax number for surgical facility: [phone]    Assessment & Plan     The proposed surgical procedure is considered INTERMEDIATE risk.    Problem List Items Addressed This Visit       Type 2 diabetes mellitus with hyperglycemia, without long-term current use of insulin (H)    Closed displaced fracture of fifth metatarsal bone of right foot with delayed healing, subsequent encounter - Primary    Relevant Orders    EKG 12-lead complete w/read - Clinics (Completed)     Other Visit Diagnoses         Preop general physical exam        Relevant Orders    EKG 12-lead complete w/read - Clinics (Completed)            Assessment & Plan  Incarcerated umbilical hernia:  - Resolved incarcerated umbilical hernia to be moved to past medical history.    Edema unspecified type:  - Likely venous insufficiency causing ankle swelling when standing for prolonged periods.    Medication management (Mounjaro and Tresiba):  - Discontinue Mounjaro 12.5 mg dose. Skip Mounjaro dose a week before surgery and resume after surgery. Decrease Tresiba dose by 20% the night before surgery.     Consent was obtained from the patient to use an AI documentation tool in the creation of  this note.  Voice recognition software was also used.  There may be associated transcribing errors.         - No identified  additional risk factors other than previously addressed    How to Take Your Medication Before Surgery  Preoperative Medication Instructions   Antiplatelet or Anticoagulation Medication Instructions   - aspirin: Discontinue aspirin 7 days prior to procedure to reduce bleeding risk. It should be resumed postoperatively.     Additional Medication Instructions   - Long acting insulin (e.g. glargine, detemir): Take 80% of the usual evening or morning dose before surgery.     - metformin: DO NOT TAKE day of surgery.   - GLP-1 Injectable (exenitide, liraglutide, semaglutide, dulaglutide, etc.): DO NOT TAKE 7 days before surgery        Recommendation  Approval given to proceed with proposed procedure, without further diagnostic evaluation.        Saran Casiano is a 59 year old, presenting for the following:  Pre-Op Exam          7/28/2025     1:53 PM   Additional Questions   Roomed by Shu LEE:  Oh Damico, 59-year-old female  - Scheduled for surgery on Wednesday for incarcerated umbilical hernia  - History of ankle swelling for years, worsens with prolonged standing, present before correction 8 years ago  - Reports legs feel less achy when wearing compression stockings  - Not taking Manjaro on the day of visit, last dose skipped due to upcoming surgery  - Takes Tresiba once daily in the evening        7/28/2025   Pre-Op Questionnaire   Have you ever had a heart attack or stroke? No   Have you ever had surgery on your heart or blood vessels, such as a stent placement, a coronary artery bypass, or surgery on an artery in your head, neck, heart, or legs? No   Do you have chest pain with activity? No   Do you have a history of heart failure? No   Do you currently have a cold, bronchitis or symptoms of other infection? No   Do you have a cough, shortness of breath, or wheezing? No   Do you or anyone in your family have previous history of blood clots? No   Do you or does anyone in your family have a serious  bleeding problem such as prolonged bleeding following surgeries or cuts? No   Have you ever had problems with anemia or been told to take iron pills? No   Have you had any abnormal blood loss such as black, tarry or bloody stools, or abnormal vaginal bleeding? No   Have you ever had a blood transfusion? No   Are you willing to have a blood transfusion if it is medically needed before, during, or after your surgery? Yes   Have you or any of your relatives ever had problems with anesthesia? No   Do you have sleep apnea, excessive snoring or daytime drowsiness? No   Do you have any artifical heart valves or other implanted medical devices like a pacemaker, defibrillator, or continuous glucose monitor? No   Do you have artificial joints? No   Are you allergic to latex? No     Advance Care Planning    Discussed advance care planning with patient; informed AVS has link to [employer] [employer].    Preoperative Review of    reviewed - no record of controlled substances prescribed.          Patient Active Problem List    Diagnosis Date Noted    Closed displaced fracture of fifth metatarsal bone of right foot with delayed healing, subsequent encounter 07/28/2025     Priority: Medium    Major depression, recurrent 06/06/2023     Priority: Medium    Type 2 diabetes mellitus with hyperglycemia, without long-term current use of insulin (H) 10/24/2017     Priority: Medium    Hyperlipidemia, unspecified hyperlipidemia type 10/24/2017     Priority: Medium    Edema, unspecified type 10/24/2017     Priority: Medium      Past Medical History:   Diagnosis Date    Diabetes mellitus (H)     Environmental allergies     Hyperlipidemia     Incarcerated umbilical hernia 12/17/2019    Added automatically from request for surgery [id number]          Past Surgical History:   Procedure Laterality Date    ENDOMETRIAL ABLATION      LAPAROSCOPIC HERNIORRHAPHY INCISIONAL N/A 1/16/2020    Procedure: HERNIORRHAPHY, UMBILICAL, LAPAROSCOPIC with mesh;   Surgeon: Kel Pittman MD;  [facility]: [facility];  Service: General     Current Outpatient Medications   Medication Sig Dispense Refill    ACCU-CHEK GUIDE test strip USE 1 EACH AS DIRECTED DAILY 100 strip 1    aspirin 81 MG EC tablet [ASPIRIN 81 MG EC TABLET] Take 81 mg by mouth daily.      BD PEN NEEDLE IVORY 2ND GEN 32G X 4 MM miscellaneous USE 1 NEEDLE TO INJECT UNDER THE SKIN DAILY. 100 each 1    blood glucose monitoring (ACCU-CHEK FASTCLIX) lancets USE 1 EACH AS DIRECTED DAILY. DISPENSE BRAND PER PATIENT'S INSURANCE AT PHARMACY DISCRETION. 102 each 3    Blood Glucose Monitoring Suppl (GLUCOCOM BLOOD GLUCOSE MONITOR) KEISHA 1 Device      buPROPion (WELLBUTRIN XL) 150 MG 24 hr tablet Take 1 tablet (150 mg) by mouth every morning. 90 tablet 3    Continuous Glucose Sensor (FREESTYLE EDUIN 3 PLUS SENSOR) MISC Change every 15 days. 6 each 1    hydrochlorothiazide (HYDRODIURIL) 25 MG tablet Take 1 tablet (25 mg) by mouth daily. 90 tablet 3    insulin degludec (TRESIBA FLEXTOUCH) 100 UNIT/ML pen Inject 11 Units subcutaneously daily.      loratadine (CLARITIN) 10 mg tablet [LORATADINE (CLARITIN) 10 MG TABLET] Take 10 mg by mouth daily.      metFORMIN (GLUCOPHAGE) 500 MG tablet TAKE 2 TABLETS (1,000MG) BY MOUTH TWICE A DAY WITH MEALS 360 tablet 2    multivitamin therapeutic tablet [MULTIVITAMIN THERAPEUTIC TABLET] Take 1 tablet by mouth daily.      rosuvastatin (CRESTOR) 20 MG tablet Take 1 tablet (20 mg) by mouth daily. 90 tablet 3    vitamin D3 (CHOLECALCIFEROL) 50 mcg (2000 units) tablet Take 1 tablet by mouth every other day.      tirzepatide (MOUNJARO) 15 MG/0.5ML SOAJ auto-injector pen Inject 0.5 mLs (15 mg) subcutaneously once a week. (Patient not taking: Reported on 7/28/2025) 2 mL 3       No Known Allergies     Social History     Tobacco Use    Smoking status: Never     Passive exposure: Past    Smokeless tobacco: Never   Substance Use Topics    Alcohol use: Not Currently     Comment: Less than 1  beverage / month       History   Drug Use No         Recent Results (from the past 720 hours)   CBC with platelets    Collection Time: 07/11/25  8:05 AM   Result Value Ref Range    WBC Count 7.4 4.0 - 11.0 10e3/uL    RBC Count 4.63 3.80 - 5.20 10e6/uL    Hemoglobin 13.7 11.7 - 15.7 g/dL    Hematocrit 40.4 35.0 - 47.0 %    MCV 87 78 - 100 fL    MCH 29.6 26.5 - 33.0 pg    MCHC 33.9 31.5 - 36.5 g/dL    RDW 13.3 10.0 - 15.0 %    Platelet Count 310 150 - 450 10e3/uL   Comprehensive metabolic panel    Collection Time: 07/11/25  8:05 AM   Result Value Ref Range    Sodium 140 135 - 145 mmol/L    Potassium 4.2 3.4 - 5.3 mmol/L    Carbon Dioxide (CO2) 21 (L) 22 - 29 mmol/L    Anion Gap 12 7 - 15 mmol/L    Urea Nitrogen 14.8 6.0 - 20.0 mg/dL    Creatinine 0.69 0.51 - 0.95 mg/dL    GFR Estimate >90 >60 mL/min/1.73m2    Calcium 10.0 8.8 - 10.4 mg/dL    Chloride 107 98 - 107 mmol/L    Glucose 125 (H) 70 - 99 mg/dL    Alkaline Phosphatase 107 40 - 150 U/L    AST 19 0 - 45 U/L    ALT 14 0 - 50 U/L    Protein Total 7.5 6.4 - 8.3 g/dL    Albumin 4.3 3.5 - 5.2 g/dL    Bilirubin Total 0.3 <=1.2 mg/dL    Patient Fasting > 8hrs? Yes    Lipid panel reflex to direct LDL Fasting    Collection Time: 07/11/25  8:05 AM   Result Value Ref Range    Cholesterol 139 <200 mg/dL    Triglycerides 98 <150 mg/dL    Direct Measure HDL 41 (L) >=50 mg/dL    LDL Cholesterol Calculated 78 <100 mg/dL    Non HDL Cholesterol 98 <130 mg/dL    Patient Fasting > 8hrs? Yes    TSH with free T4 reflex    Collection Time: 07/11/25  8:05 AM   Result Value Ref Range    TSH 2.29 0.30 - 4.20 uIU/mL   Hemoglobin A1c    Collection Time: 07/11/25  8:05 AM   Result Value Ref Range    Estimated Average Glucose 131 (H) <117 mg/dL    Hemoglobin A1C 6.2 (H) 0.0 - 5.6 %   UA Macroscopic with reflex to Microscopic and Culture    Collection Time: 07/11/25  8:05 AM    Specimen: Urine, Midstream   Result Value Ref Range    Color Urine Yellow Colorless, Straw, Light Yellow, Yellow     "Appearance Urine Clear Clear    Glucose Urine Negative Negative mg/dL    Bilirubin Urine Negative Negative    Ketones Urine Negative Negative mg/dL    Specific Gravity Urine 1.025 1.005 - 1.030    Blood Urine Negative Negative    pH Urine 5.5 5.0 - 8.0    Protein Albumin Urine Negative Negative mg/dL    Urobilinogen Urine 1.0 0.2, 1.0 E.U./dL    Nitrite Urine Negative Negative    Leukocyte Esterase Urine Small (A) Negative   UA Microscopic with Reflex to Culture    Collection Time: 07/11/25  8:05 AM   Result Value Ref Range    Bacteria Urine Few (A) None Seen /HPF    RBC Urine 0-2 0-2 /HPF /HPF    WBC Urine 0-5 0-5 /HPF /HPF    Squamous Epithelials Urine Few (A) None Seen /LPF          Objective    /64 (BP Location: Right arm, Patient Position: Sitting)   Pulse 88   Temp 98.9  F (37.2  C) (Tympanic)   Resp 16   Ht 1.549 m (5' 1\")   Wt 79.8 kg (176 lb)   LMP  (LMP Unknown)   SpO2 97%   BMI 33.25 kg/m     Estimated body mass index is 33.25 kg/m  as calculated from the following:    Height as of this encounter: 1.549 m (5' 1\").    Weight as of this encounter: 79.8 kg (176 lb).  Physical Exam      General: alert and oriented ×3 no acute distress.    HEENT: Normocephalic and atraumatic.   Eyes pupils are equal round and reactive to light     Hearing is grossly normal     Nares are patent there is no rhinorrhea.     Mucous membranes are moist and pink.    Chest: has bilateral rise with no increased work of breathing. clear to auscultation without wheezes, rhonchi, or rales.    Cardiovascular: normal perfusion and brisk capillary refill. S1S2 with regular rate and rhythm and no murmurs, gallops or rubs.    Musculoskeletal: no gross focal abnormalities and normal gait.    Neuro: no gross focal abnormalities and memory seems intact. CN 2-12 are grossly intact.    Psychiatric: speech is clear and coherent and fluent. Patient dressed appropriately for the weather. Mood is appropriate and affect is " full.          Recent Labs   Lab Test 07/11/25  0805 01/24/25  0843 10/15/24  0820   HGB 13.7  --  13.3     --  373     --  141   POTASSIUM 4.2  --  4.6   CR 0.69  --  0.94   A1C 6.2* 6.7* 6.3*        Diagnostics  No labs were ordered during this visit.   EKG: appears normal, NSR, normal axis, normal intervals, no acute ST/T changes c/w ischemia, no LVH by voltage criteria, there are no prior tracings available    Revised Cardiac Risk Index (RCRI)  The patient has the following serious cardiovascular risks for perioperative complications:   - Diabetes Mellitus (on Insulin) = 1 point     RCRI Interpretation: 1 point: Class II (low risk - 0.9% complication rate)         Signed Electronically by: Kelly Teixeira MD  A copy of this evaluation report is provided to the requesting physician.

## 2025-08-11 ENCOUNTER — VIRTUAL VISIT (OUTPATIENT)
Dept: PHARMACY | Facility: CLINIC | Age: 60
End: 2025-08-11
Payer: COMMERCIAL

## 2025-08-11 DIAGNOSIS — Z79.4 TYPE 2 DIABETES MELLITUS WITHOUT COMPLICATION, WITH LONG-TERM CURRENT USE OF INSULIN (H): ICD-10-CM

## 2025-08-11 DIAGNOSIS — E11.9 TYPE 2 DIABETES MELLITUS WITHOUT COMPLICATION, WITH LONG-TERM CURRENT USE OF INSULIN (H): ICD-10-CM

## 2025-08-11 PROCEDURE — 99207 PR NO CHARGE LOS: CPT | Mod: 93 | Performed by: PHARMACIST

## 2025-08-11 RX ORDER — INSULIN DEGLUDEC 100 U/ML
8 INJECTION, SOLUTION SUBCUTANEOUS DAILY
Status: SHIPPED
Start: 2025-08-11

## 2025-08-14 ENCOUNTER — TRANSFERRED RECORDS (OUTPATIENT)
Dept: HEALTH INFORMATION MANAGEMENT | Facility: CLINIC | Age: 60
End: 2025-08-14
Payer: COMMERCIAL